# Patient Record
Sex: MALE | Race: WHITE | NOT HISPANIC OR LATINO | Employment: OTHER | URBAN - METROPOLITAN AREA
[De-identification: names, ages, dates, MRNs, and addresses within clinical notes are randomized per-mention and may not be internally consistent; named-entity substitution may affect disease eponyms.]

---

## 2017-01-10 ENCOUNTER — OFFICE VISIT (OUTPATIENT)
Dept: ORTHOPEDIC SURGERY | Facility: CLINIC | Age: 72
End: 2017-01-10

## 2017-01-10 VITALS — TEMPERATURE: 97.9 F | WEIGHT: 250 LBS | HEIGHT: 72 IN | BODY MASS INDEX: 33.86 KG/M2

## 2017-01-10 DIAGNOSIS — Z96.642 HISTORY OF TOTAL HIP ARTHROPLASTY, LEFT: Primary | ICD-10-CM

## 2017-01-10 DIAGNOSIS — Z96.642 HISTORY OF LEFT HIP REPLACEMENT: ICD-10-CM

## 2017-01-10 PROCEDURE — 73502 X-RAY EXAM HIP UNI 2-3 VIEWS: CPT | Performed by: ORTHOPAEDIC SURGERY

## 2017-01-10 PROCEDURE — 99212 OFFICE O/P EST SF 10 MIN: CPT | Performed by: ORTHOPAEDIC SURGERY

## 2017-02-13 RX ORDER — AMLODIPINE BESYLATE 5 MG/1
TABLET ORAL
Qty: 90 TABLET | Refills: 1 | Status: SHIPPED | OUTPATIENT
Start: 2017-02-13 | End: 2017-08-14 | Stop reason: SDUPTHER

## 2017-02-24 ENCOUNTER — TELEPHONE (OUTPATIENT)
Dept: INTERNAL MEDICINE | Facility: CLINIC | Age: 72
End: 2017-02-24

## 2017-02-27 RX ORDER — LANCETS
1 EACH MISCELLANEOUS DAILY
Qty: 100 EACH | Refills: 5 | Status: SHIPPED | OUTPATIENT
Start: 2017-02-27 | End: 2018-06-21 | Stop reason: SDUPTHER

## 2017-03-17 RX ORDER — LEVOTHYROXINE SODIUM 0.05 MG/1
TABLET ORAL
Qty: 90 TABLET | Refills: 0 | Status: SHIPPED | OUTPATIENT
Start: 2017-03-17 | End: 2017-06-13 | Stop reason: SDUPTHER

## 2017-04-21 RX ORDER — LISINOPRIL AND HYDROCHLOROTHIAZIDE 20; 12.5 MG/1; MG/1
TABLET ORAL
Qty: 90 TABLET | Refills: 1 | Status: SHIPPED | OUTPATIENT
Start: 2017-04-21 | End: 2017-10-23 | Stop reason: SDUPTHER

## 2017-05-16 ENCOUNTER — TELEPHONE (OUTPATIENT)
Dept: INTERNAL MEDICINE | Facility: CLINIC | Age: 72
End: 2017-05-16

## 2017-05-17 ENCOUNTER — RESULTS ENCOUNTER (OUTPATIENT)
Dept: INTERNAL MEDICINE | Facility: CLINIC | Age: 72
End: 2017-05-17

## 2017-05-17 DIAGNOSIS — E78.5 HYPERLIPIDEMIA, UNSPECIFIED HYPERLIPIDEMIA TYPE: ICD-10-CM

## 2017-05-17 DIAGNOSIS — E03.8 OTHER SPECIFIED HYPOTHYROIDISM: ICD-10-CM

## 2017-05-17 DIAGNOSIS — E11.8 CONTROLLED TYPE 2 DIABETES MELLITUS WITH COMPLICATION, WITHOUT LONG-TERM CURRENT USE OF INSULIN (HCC): ICD-10-CM

## 2017-05-17 LAB
ALBUMIN SERPL-MCNC: 4.4 G/DL (ref 3.5–4.8)
ALBUMIN/GLOB SERPL: 1.6 {RATIO} (ref 1.2–2.2)
ALP SERPL-CCNC: 77 IU/L (ref 39–117)
ALT SERPL-CCNC: 54 IU/L (ref 0–44)
AST SERPL-CCNC: 48 IU/L (ref 0–40)
BILIRUB SERPL-MCNC: 0.3 MG/DL (ref 0–1.2)
BUN SERPL-MCNC: 11 MG/DL (ref 8–27)
BUN/CREAT SERPL: 12 (ref 10–24)
CALCIUM SERPL-MCNC: 9.3 MG/DL (ref 8.6–10.2)
CHLORIDE SERPL-SCNC: 101 MMOL/L (ref 96–106)
CHOLEST SERPL-MCNC: 171 MG/DL (ref 100–199)
CO2 SERPL-SCNC: 25 MMOL/L (ref 18–29)
CREAT SERPL-MCNC: 0.91 MG/DL (ref 0.76–1.27)
GLOBULIN SER CALC-MCNC: 2.7 G/DL (ref 1.5–4.5)
GLUCOSE SERPL-MCNC: 134 MG/DL (ref 65–99)
HBA1C MFR BLD: 7.4 % (ref 4.8–5.6)
HCV AB S/CO SERPL IA: <0.1 S/CO RATIO (ref 0–0.9)
HDLC SERPL-MCNC: 34 MG/DL
LDLC SERPL CALC-MCNC: 95 MG/DL (ref 0–99)
LDLC/HDLC SERPL: 2.8 RATIO UNITS (ref 0–3.6)
POTASSIUM SERPL-SCNC: 4.8 MMOL/L (ref 3.5–5.2)
PROT SERPL-MCNC: 7.1 G/DL (ref 6–8.5)
SODIUM SERPL-SCNC: 143 MMOL/L (ref 134–144)
TRIGL SERPL-MCNC: 210 MG/DL (ref 0–149)
TSH SERPL DL<=0.005 MIU/L-ACNC: 3.19 UIU/ML (ref 0.45–4.5)
VLDLC SERPL CALC-MCNC: 42 MG/DL (ref 5–40)

## 2017-05-22 ENCOUNTER — OFFICE VISIT (OUTPATIENT)
Dept: INTERNAL MEDICINE | Facility: CLINIC | Age: 72
End: 2017-05-22

## 2017-05-22 VITALS
HEIGHT: 72 IN | OXYGEN SATURATION: 97 % | DIASTOLIC BLOOD PRESSURE: 78 MMHG | SYSTOLIC BLOOD PRESSURE: 114 MMHG | BODY MASS INDEX: 34.23 KG/M2 | WEIGHT: 252.7 LBS | HEART RATE: 67 BPM

## 2017-05-22 DIAGNOSIS — E78.5 HYPERLIPIDEMIA, UNSPECIFIED HYPERLIPIDEMIA TYPE: Primary | ICD-10-CM

## 2017-05-22 DIAGNOSIS — E11.8 CONTROLLED TYPE 2 DIABETES MELLITUS WITH COMPLICATION, WITHOUT LONG-TERM CURRENT USE OF INSULIN (HCC): ICD-10-CM

## 2017-05-22 DIAGNOSIS — R53.83 FATIGUE, UNSPECIFIED TYPE: ICD-10-CM

## 2017-05-22 DIAGNOSIS — I10 ESSENTIAL HYPERTENSION: ICD-10-CM

## 2017-05-22 PROCEDURE — 99214 OFFICE O/P EST MOD 30 MIN: CPT | Performed by: INTERNAL MEDICINE

## 2017-06-13 RX ORDER — LEVOTHYROXINE SODIUM 0.05 MG/1
TABLET ORAL
Qty: 90 TABLET | Refills: 1 | Status: SHIPPED | OUTPATIENT
Start: 2017-06-13 | End: 2017-12-20 | Stop reason: SDUPTHER

## 2017-06-23 RX ORDER — METFORMIN HYDROCHLORIDE 500 MG/1
TABLET, EXTENDED RELEASE ORAL
Qty: 180 TABLET | Refills: 1 | Status: SHIPPED | OUTPATIENT
Start: 2017-06-23 | End: 2018-02-25 | Stop reason: SDUPTHER

## 2017-08-14 RX ORDER — AMLODIPINE BESYLATE 5 MG/1
TABLET ORAL
Qty: 90 TABLET | Refills: 1 | Status: SHIPPED | OUTPATIENT
Start: 2017-08-14 | End: 2018-02-16 | Stop reason: SDUPTHER

## 2017-08-15 DIAGNOSIS — E11.8 CONTROLLED TYPE 2 DIABETES MELLITUS WITH COMPLICATION, WITHOUT LONG-TERM CURRENT USE OF INSULIN (HCC): ICD-10-CM

## 2017-08-15 DIAGNOSIS — R53.83 FATIGUE, UNSPECIFIED TYPE: ICD-10-CM

## 2017-08-16 LAB
ALBUMIN SERPL-MCNC: 4.5 G/DL (ref 3.5–5.2)
ALBUMIN/GLOB SERPL: 1.5 G/DL
ALP SERPL-CCNC: 73 U/L (ref 39–117)
ALT SERPL-CCNC: 50 U/L (ref 1–41)
AST SERPL-CCNC: 35 U/L (ref 1–40)
BASOPHILS # BLD AUTO: 0.03 10*3/MM3 (ref 0–0.2)
BASOPHILS NFR BLD AUTO: 0.3 % (ref 0–1.5)
BILIRUB SERPL-MCNC: 0.4 MG/DL (ref 0.1–1.2)
BUN SERPL-MCNC: 12 MG/DL (ref 8–23)
BUN/CREAT SERPL: 12.2 (ref 7–25)
CALCIUM SERPL-MCNC: 9.8 MG/DL (ref 8.6–10.5)
CHLORIDE SERPL-SCNC: 99 MMOL/L (ref 98–107)
CO2 SERPL-SCNC: 27 MMOL/L (ref 22–29)
CREAT SERPL-MCNC: 0.98 MG/DL (ref 0.76–1.27)
EOSINOPHIL # BLD AUTO: 0.58 10*3/MM3 (ref 0–0.7)
EOSINOPHIL NFR BLD AUTO: 6.1 % (ref 0.3–6.2)
ERYTHROCYTE [DISTWIDTH] IN BLOOD BY AUTOMATED COUNT: 13.3 % (ref 11.5–14.5)
GLOBULIN SER CALC-MCNC: 3.1 GM/DL
GLUCOSE SERPL-MCNC: 142 MG/DL (ref 65–99)
HBA1C MFR BLD: 7.03 % (ref 4.8–5.6)
HCT VFR BLD AUTO: 46.1 % (ref 40.4–52.2)
HGB BLD-MCNC: 14.5 G/DL (ref 13.7–17.6)
IMM GRANULOCYTES # BLD: 0.05 10*3/MM3 (ref 0–0.03)
IMM GRANULOCYTES NFR BLD: 0.5 % (ref 0–0.5)
LYMPHOCYTES # BLD AUTO: 3.08 10*3/MM3 (ref 0.9–4.8)
LYMPHOCYTES NFR BLD AUTO: 32.4 % (ref 19.6–45.3)
MCH RBC QN AUTO: 28.7 PG (ref 27–32.7)
MCHC RBC AUTO-ENTMCNC: 31.5 G/DL (ref 32.6–36.4)
MCV RBC AUTO: 91.1 FL (ref 79.8–96.2)
MONOCYTES # BLD AUTO: 0.79 10*3/MM3 (ref 0.2–1.2)
MONOCYTES NFR BLD AUTO: 8.3 % (ref 5–12)
NEUTROPHILS # BLD AUTO: 4.99 10*3/MM3 (ref 1.9–8.1)
NEUTROPHILS NFR BLD AUTO: 52.4 % (ref 42.7–76)
PLATELET # BLD AUTO: 278 10*3/MM3 (ref 140–500)
POTASSIUM SERPL-SCNC: 4.7 MMOL/L (ref 3.5–5.2)
PROT SERPL-MCNC: 7.6 G/DL (ref 6–8.5)
RBC # BLD AUTO: 5.06 10*6/MM3 (ref 4.6–6)
SODIUM SERPL-SCNC: 142 MMOL/L (ref 136–145)
WBC # BLD AUTO: 9.52 10*3/MM3 (ref 4.5–10.7)

## 2017-08-22 ENCOUNTER — OFFICE VISIT (OUTPATIENT)
Dept: INTERNAL MEDICINE | Facility: CLINIC | Age: 72
End: 2017-08-22

## 2017-08-22 VITALS
SYSTOLIC BLOOD PRESSURE: 116 MMHG | DIASTOLIC BLOOD PRESSURE: 64 MMHG | BODY MASS INDEX: 34 KG/M2 | OXYGEN SATURATION: 98 % | WEIGHT: 251 LBS | HEIGHT: 72 IN | HEART RATE: 61 BPM

## 2017-08-22 DIAGNOSIS — E11.40 CONTROLLED TYPE 2 DIABETES MELLITUS WITH DIABETIC NEUROPATHY, WITHOUT LONG-TERM CURRENT USE OF INSULIN (HCC): ICD-10-CM

## 2017-08-22 DIAGNOSIS — E11.8 CONTROLLED TYPE 2 DIABETES MELLITUS WITH COMPLICATION, WITHOUT LONG-TERM CURRENT USE OF INSULIN (HCC): Primary | ICD-10-CM

## 2017-08-22 DIAGNOSIS — R35.1 NOCTURIA: ICD-10-CM

## 2017-08-22 DIAGNOSIS — I10 ESSENTIAL HYPERTENSION: ICD-10-CM

## 2017-08-22 DIAGNOSIS — Z00.00 MEDICARE ANNUAL WELLNESS VISIT, INITIAL: ICD-10-CM

## 2017-08-22 DIAGNOSIS — G62.9 NEUROPATHY: ICD-10-CM

## 2017-08-22 PROCEDURE — 99213 OFFICE O/P EST LOW 20 MIN: CPT | Performed by: INTERNAL MEDICINE

## 2017-08-22 PROCEDURE — G0438 PPPS, INITIAL VISIT: HCPCS | Performed by: INTERNAL MEDICINE

## 2017-10-23 RX ORDER — LISINOPRIL AND HYDROCHLOROTHIAZIDE 20; 12.5 MG/1; MG/1
TABLET ORAL
Qty: 90 TABLET | Refills: 0 | Status: SHIPPED | OUTPATIENT
Start: 2017-10-23 | End: 2018-01-17 | Stop reason: SDUPTHER

## 2017-11-13 DIAGNOSIS — I10 ESSENTIAL HYPERTENSION: ICD-10-CM

## 2017-11-13 DIAGNOSIS — R35.1 NOCTURIA: ICD-10-CM

## 2017-11-13 DIAGNOSIS — E11.8 CONTROLLED TYPE 2 DIABETES MELLITUS WITH COMPLICATION, WITHOUT LONG-TERM CURRENT USE OF INSULIN (HCC): ICD-10-CM

## 2017-11-14 LAB
ALBUMIN SERPL-MCNC: 4.4 G/DL (ref 3.5–5.2)
ALBUMIN/GLOB SERPL: 1.5 G/DL
ALP SERPL-CCNC: 82 U/L (ref 39–117)
ALT SERPL-CCNC: 42 U/L (ref 1–41)
AST SERPL-CCNC: 30 U/L (ref 1–40)
BILIRUB SERPL-MCNC: 0.3 MG/DL (ref 0.1–1.2)
BUN SERPL-MCNC: 11 MG/DL (ref 8–23)
BUN/CREAT SERPL: 10.4 (ref 7–25)
CALCIUM SERPL-MCNC: 10.1 MG/DL (ref 8.6–10.5)
CHLORIDE SERPL-SCNC: 100 MMOL/L (ref 98–107)
CHOLEST SERPL-MCNC: 178 MG/DL (ref 0–200)
CO2 SERPL-SCNC: 32.2 MMOL/L (ref 22–29)
CREAT SERPL-MCNC: 1.06 MG/DL (ref 0.76–1.27)
GFR SERPLBLD CREATININE-BSD FMLA CKD-EPI: 69 ML/MIN/1.73
GFR SERPLBLD CREATININE-BSD FMLA CKD-EPI: 84 ML/MIN/1.73
GLOBULIN SER CALC-MCNC: 2.9 GM/DL
GLUCOSE SERPL-MCNC: 141 MG/DL (ref 65–99)
HBA1C MFR BLD: 7.16 % (ref 4.8–5.6)
HDLC SERPL-MCNC: 38 MG/DL (ref 40–60)
LDLC SERPL CALC-MCNC: 91 MG/DL (ref 0–100)
LDLC/HDLC SERPL: 2.39 {RATIO}
POTASSIUM SERPL-SCNC: 4.6 MMOL/L (ref 3.5–5.2)
PROT SERPL-MCNC: 7.3 G/DL (ref 6–8.5)
PSA SERPL-MCNC: 1.91 NG/ML (ref 0–4)
SODIUM SERPL-SCNC: 144 MMOL/L (ref 136–145)
TRIGL SERPL-MCNC: 246 MG/DL (ref 0–150)
TSH SERPL DL<=0.005 MIU/L-ACNC: 2.97 MIU/ML (ref 0.27–4.2)
VLDLC SERPL CALC-MCNC: 49.2 MG/DL (ref 5–40)

## 2017-11-22 ENCOUNTER — OFFICE VISIT (OUTPATIENT)
Dept: INTERNAL MEDICINE | Facility: CLINIC | Age: 72
End: 2017-11-22

## 2017-11-22 VITALS
SYSTOLIC BLOOD PRESSURE: 130 MMHG | OXYGEN SATURATION: 97 % | HEART RATE: 64 BPM | DIASTOLIC BLOOD PRESSURE: 80 MMHG | WEIGHT: 253.6 LBS | HEIGHT: 72 IN | BODY MASS INDEX: 34.35 KG/M2

## 2017-11-22 DIAGNOSIS — E11.40 CONTROLLED TYPE 2 DIABETES MELLITUS WITH DIABETIC NEUROPATHY, WITHOUT LONG-TERM CURRENT USE OF INSULIN (HCC): Primary | ICD-10-CM

## 2017-11-22 DIAGNOSIS — I10 ESSENTIAL HYPERTENSION: ICD-10-CM

## 2017-11-22 DIAGNOSIS — Z23 NEED FOR INFLUENZA VACCINATION: ICD-10-CM

## 2017-11-22 DIAGNOSIS — F51.01 PRIMARY INSOMNIA: ICD-10-CM

## 2017-11-22 DIAGNOSIS — E55.9 VITAMIN D DEFICIENCY: ICD-10-CM

## 2017-11-22 DIAGNOSIS — E78.5 HYPERLIPIDEMIA, UNSPECIFIED HYPERLIPIDEMIA TYPE: ICD-10-CM

## 2017-11-22 PROCEDURE — 99214 OFFICE O/P EST MOD 30 MIN: CPT | Performed by: INTERNAL MEDICINE

## 2017-12-20 RX ORDER — LEVOTHYROXINE SODIUM 0.05 MG/1
TABLET ORAL
Qty: 90 TABLET | Refills: 1 | Status: SHIPPED | OUTPATIENT
Start: 2017-12-20 | End: 2018-06-16 | Stop reason: SDUPTHER

## 2017-12-27 ENCOUNTER — OFFICE VISIT (OUTPATIENT)
Dept: INTERNAL MEDICINE | Facility: CLINIC | Age: 72
End: 2017-12-27

## 2017-12-27 VITALS
OXYGEN SATURATION: 98 % | HEART RATE: 60 BPM | SYSTOLIC BLOOD PRESSURE: 120 MMHG | WEIGHT: 253.7 LBS | BODY MASS INDEX: 34.36 KG/M2 | HEIGHT: 72 IN | DIASTOLIC BLOOD PRESSURE: 60 MMHG

## 2017-12-27 DIAGNOSIS — E11.40 CONTROLLED TYPE 2 DIABETES MELLITUS WITH DIABETIC NEUROPATHY, WITHOUT LONG-TERM CURRENT USE OF INSULIN (HCC): ICD-10-CM

## 2017-12-27 DIAGNOSIS — E53.8 COBALAMIN DEFICIENCY: ICD-10-CM

## 2017-12-27 DIAGNOSIS — N32.81 OAB (OVERACTIVE BLADDER): Primary | ICD-10-CM

## 2017-12-27 DIAGNOSIS — I10 ESSENTIAL HYPERTENSION: ICD-10-CM

## 2017-12-27 DIAGNOSIS — E55.9 VITAMIN D DEFICIENCY: ICD-10-CM

## 2017-12-27 PROCEDURE — 99213 OFFICE O/P EST LOW 20 MIN: CPT | Performed by: INTERNAL MEDICINE

## 2018-01-17 RX ORDER — LISINOPRIL AND HYDROCHLOROTHIAZIDE 20; 12.5 MG/1; MG/1
TABLET ORAL
Qty: 90 TABLET | Refills: 0 | Status: SHIPPED | OUTPATIENT
Start: 2018-01-17 | End: 2018-04-12 | Stop reason: SDUPTHER

## 2018-02-16 RX ORDER — AMLODIPINE BESYLATE 5 MG/1
TABLET ORAL
Qty: 90 TABLET | Refills: 0 | Status: SHIPPED | OUTPATIENT
Start: 2018-02-16 | End: 2018-02-21

## 2018-02-21 ENCOUNTER — LAB (OUTPATIENT)
Dept: LAB | Facility: HOSPITAL | Age: 73
End: 2018-02-21

## 2018-02-21 ENCOUNTER — TELEPHONE (OUTPATIENT)
Dept: CARDIOLOGY | Facility: CLINIC | Age: 73
End: 2018-02-21

## 2018-02-21 ENCOUNTER — OFFICE VISIT (OUTPATIENT)
Dept: INTERNAL MEDICINE | Facility: CLINIC | Age: 73
End: 2018-02-21

## 2018-02-21 ENCOUNTER — OFFICE VISIT (OUTPATIENT)
Dept: CARDIOLOGY | Facility: CLINIC | Age: 73
End: 2018-02-21

## 2018-02-21 VITALS
BODY MASS INDEX: 34.54 KG/M2 | SYSTOLIC BLOOD PRESSURE: 130 MMHG | HEIGHT: 72 IN | WEIGHT: 255 LBS | DIASTOLIC BLOOD PRESSURE: 80 MMHG | HEART RATE: 137 BPM

## 2018-02-21 VITALS
HEIGHT: 72 IN | WEIGHT: 256 LBS | OXYGEN SATURATION: 98 % | BODY MASS INDEX: 34.67 KG/M2 | DIASTOLIC BLOOD PRESSURE: 88 MMHG | HEART RATE: 76 BPM | SYSTOLIC BLOOD PRESSURE: 130 MMHG

## 2018-02-21 DIAGNOSIS — I48.0 PAF (PAROXYSMAL ATRIAL FIBRILLATION) (HCC): Primary | ICD-10-CM

## 2018-02-21 DIAGNOSIS — I48.0 PAF (PAROXYSMAL ATRIAL FIBRILLATION) (HCC): ICD-10-CM

## 2018-02-21 DIAGNOSIS — R93.3 ABNORMAL FINDINGS ON DIAGNOSTIC IMAGING OF OTHER PARTS OF DIGESTIVE TRACT: ICD-10-CM

## 2018-02-21 DIAGNOSIS — R00.2 HEART PALPITATIONS: Primary | ICD-10-CM

## 2018-02-21 LAB
25(OH)D3 SERPL-MCNC: 37.4 NG/ML (ref 30–100)
ALBUMIN SERPL-MCNC: 4.2 G/DL (ref 3.5–5.2)
ALBUMIN/GLOB SERPL: 1.4 G/DL
ALP SERPL-CCNC: 72 U/L (ref 39–117)
ALT SERPL W P-5'-P-CCNC: 34 U/L (ref 1–41)
ANION GAP SERPL CALCULATED.3IONS-SCNC: 11.8 MMOL/L
AST SERPL-CCNC: 26 U/L (ref 1–40)
BASOPHILS # BLD AUTO: 0.03 10*3/MM3 (ref 0–0.2)
BASOPHILS NFR BLD AUTO: 0.3 % (ref 0–1.5)
BILIRUB SERPL-MCNC: 0.4 MG/DL (ref 0.1–1.2)
BUN BLD-MCNC: 15 MG/DL (ref 8–23)
BUN/CREAT SERPL: 15.8 (ref 7–25)
CALCIUM SPEC-SCNC: 9.3 MG/DL (ref 8.6–10.5)
CHLORIDE SERPL-SCNC: 98 MMOL/L (ref 98–107)
CHOLEST SERPL-MCNC: 184 MG/DL (ref 0–200)
CO2 SERPL-SCNC: 27.2 MMOL/L (ref 22–29)
CREAT BLD-MCNC: 0.95 MG/DL (ref 0.76–1.27)
DEPRECATED RDW RBC AUTO: 42.5 FL (ref 37–54)
EOSINOPHIL # BLD AUTO: 0.51 10*3/MM3 (ref 0–0.7)
EOSINOPHIL NFR BLD AUTO: 5.1 % (ref 0.3–6.2)
ERYTHROCYTE [DISTWIDTH] IN BLOOD BY AUTOMATED COUNT: 13.2 % (ref 11.5–14.5)
GFR SERPL CREATININE-BSD FRML MDRD: 78 ML/MIN/1.73
GLOBULIN UR ELPH-MCNC: 3.1 GM/DL
GLUCOSE BLD-MCNC: 140 MG/DL (ref 65–99)
HBA1C MFR BLD: 7.54 % (ref 4.8–5.6)
HCT VFR BLD AUTO: 47.1 % (ref 40.4–52.2)
HDLC SERPL-MCNC: 35 MG/DL (ref 40–60)
HGB BLD-MCNC: 15.4 G/DL (ref 13.7–17.6)
IMM GRANULOCYTES # BLD: 0.05 10*3/MM3 (ref 0–0.03)
IMM GRANULOCYTES NFR BLD: 0.5 % (ref 0–0.5)
LDLC SERPL CALC-MCNC: 102 MG/DL (ref 0–100)
LDLC/HDLC SERPL: 2.93 {RATIO}
LYMPHOCYTES # BLD AUTO: 3.47 10*3/MM3 (ref 0.9–4.8)
LYMPHOCYTES NFR BLD AUTO: 34.7 % (ref 19.6–45.3)
MCH RBC QN AUTO: 28.8 PG (ref 27–32.7)
MCHC RBC AUTO-ENTMCNC: 32.7 G/DL (ref 32.6–36.4)
MCV RBC AUTO: 88.2 FL (ref 79.8–96.2)
MONOCYTES # BLD AUTO: 0.72 10*3/MM3 (ref 0.2–1.2)
MONOCYTES NFR BLD AUTO: 7.2 % (ref 5–12)
NEUTROPHILS # BLD AUTO: 5.22 10*3/MM3 (ref 1.9–8.1)
NEUTROPHILS NFR BLD AUTO: 52.2 % (ref 42.7–76)
PLATELET # BLD AUTO: 245 10*3/MM3 (ref 140–500)
PMV BLD AUTO: 10.4 FL (ref 6–12)
POTASSIUM BLD-SCNC: 4.1 MMOL/L (ref 3.5–5.2)
PROT SERPL-MCNC: 7.3 G/DL (ref 6–8.5)
RBC # BLD AUTO: 5.34 10*6/MM3 (ref 4.6–6)
SODIUM BLD-SCNC: 137 MMOL/L (ref 136–145)
TRIGL SERPL-MCNC: 233 MG/DL (ref 0–150)
TSH SERPL DL<=0.05 MIU/L-ACNC: 2.07 MIU/ML (ref 0.27–4.2)
VLDLC SERPL-MCNC: 46.6 MG/DL (ref 5–40)
WBC NRBC COR # BLD: 10 10*3/MM3 (ref 4.5–10.7)

## 2018-02-21 PROCEDURE — 99213 OFFICE O/P EST LOW 20 MIN: CPT | Performed by: INTERNAL MEDICINE

## 2018-02-21 PROCEDURE — 80053 COMPREHEN METABOLIC PANEL: CPT

## 2018-02-21 PROCEDURE — 36415 COLL VENOUS BLD VENIPUNCTURE: CPT

## 2018-02-21 PROCEDURE — 93000 ELECTROCARDIOGRAM COMPLETE: CPT | Performed by: INTERNAL MEDICINE

## 2018-02-21 PROCEDURE — 84443 ASSAY THYROID STIM HORMONE: CPT

## 2018-02-21 PROCEDURE — 99205 OFFICE O/P NEW HI 60 MIN: CPT | Performed by: INTERNAL MEDICINE

## 2018-02-21 PROCEDURE — 80061 LIPID PANEL: CPT

## 2018-02-21 PROCEDURE — 82306 VITAMIN D 25 HYDROXY: CPT | Performed by: INTERNAL MEDICINE

## 2018-02-21 PROCEDURE — 83036 HEMOGLOBIN GLYCOSYLATED A1C: CPT | Performed by: INTERNAL MEDICINE

## 2018-02-21 PROCEDURE — 85025 COMPLETE CBC W/AUTO DIFF WBC: CPT

## 2018-02-21 RX ORDER — METOPROLOL SUCCINATE 25 MG/1
25 TABLET, EXTENDED RELEASE ORAL DAILY
Qty: 30 TABLET | Refills: 11 | Status: SHIPPED | OUTPATIENT
Start: 2018-02-21 | End: 2019-03-11 | Stop reason: SDUPTHER

## 2018-02-22 ENCOUNTER — RESULTS ENCOUNTER (OUTPATIENT)
Dept: INTERNAL MEDICINE | Facility: CLINIC | Age: 73
End: 2018-02-22

## 2018-02-22 ENCOUNTER — HOSPITAL ENCOUNTER (OUTPATIENT)
Dept: CARDIOLOGY | Facility: HOSPITAL | Age: 73
Discharge: HOME | End: 2018-02-22
Attending: INTERNAL MEDICINE | Admitting: INTERNAL MEDICINE

## 2018-02-22 VITALS
HEIGHT: 70 IN | DIASTOLIC BLOOD PRESSURE: 80 MMHG | BODY MASS INDEX: 36.65 KG/M2 | SYSTOLIC BLOOD PRESSURE: 120 MMHG | WEIGHT: 256 LBS | HEART RATE: 64 BPM

## 2018-02-22 DIAGNOSIS — I10 ESSENTIAL HYPERTENSION: ICD-10-CM

## 2018-02-22 DIAGNOSIS — E11.40 CONTROLLED TYPE 2 DIABETES MELLITUS WITH DIABETIC NEUROPATHY, WITHOUT LONG-TERM CURRENT USE OF INSULIN (HCC): ICD-10-CM

## 2018-02-22 DIAGNOSIS — I48.0 PAF (PAROXYSMAL ATRIAL FIBRILLATION) (HCC): ICD-10-CM

## 2018-02-22 DIAGNOSIS — E55.9 VITAMIN D DEFICIENCY: ICD-10-CM

## 2018-02-22 LAB
ASCENDING AORTA: 3.2 CM
BH CV ECHO MEAS - AO ROOT DIAM: 3.7 CM
BH CV ECHO MEAS - AO V2 MAX: 127 CM/SEC
BH CV ECHO MEAS - AO V2 VTI: 31 CM
BH CV ECHO MEAS - EDV(MOD-SP2): 104 ML
BH CV ECHO MEAS - EDV(MOD-SP4): 149 ML
BH CV ECHO MEAS - EF(MOD-SP4): 62 %
BH CV ECHO MEAS - ESV(MOD-SP2): 40 ML
BH CV ECHO MEAS - ESV(MOD-SP4): 58 ML
BH CV ECHO MEAS - IVSD: 1.3 CM
BH CV ECHO MEAS - LAT PEAK E' VEL: 10 CM/SEC
BH CV ECHO MEAS - LV V1 MAX: 104 CM/SEC
BH CV ECHO MEAS - LV V1 VTI: 27 CM
BH CV ECHO MEAS - LVIDD: 6 CM
BH CV ECHO MEAS - LVIDS: 4.1 CM
BH CV ECHO MEAS - LVOT DIAM: 1.9 CM
BH CV ECHO MEAS - LVPWD: 1.2 CM
BH CV ECHO MEAS - MED PEAK E' VEL: 7 CM/SEC
BH CV ECHO MEAS - MV A DUR: 116 SEC
BH CV ECHO MEAS - MV A MAX VEL: 89 CM/SEC
BH CV ECHO MEAS - MV DEC TIME: 176 MSEC
BH CV ECHO MEAS - MV E MAX VEL: 90 CM/SEC
BH CV ECHO MEAS - MV E/A: 1
BH CV ECHO MEAS - PA ACC TIME: 151 SEC
BH CV ECHO MEAS - PULM A REVS DUR: 106 SEC
BH CV ECHO MEAS - PULM A REVS VEL: 106 CM/SEC
BH CV ECHO MEAS - PULM DIAS VEL: 37 CM/SEC
BH CV ECHO MEAS - PULM S/D: 1.5
BH CV ECHO MEAS - PULM SYS VEL: 57 CM/SEC
BH CV ECHO MEAS - RAP SYSTOLE: 8 MMHG
BH CV ECHO MEAS - RV V1 MAX: 107 CM/SEC
BH CV ECHO MEAS - RV V1 VTI: 18 CM
BH CV ECHO MEAS - RVOT DIAM: 1.9 CM
BH CV ECHO MEAS - RVSP: 28 MMHG
BH CV ECHO MEAS - SUP REN AO DIAM: 2.1 CM
BH CV ECHO MEAS - TAPSE (>1.6): 2.4 CM2
BH CV ECHO MEAS - TR MAX VEL: 222 CM/SEC
BH CV XLRA - RV BASE: 2.9 CM
BH CV XLRA - TDI S': 10 CM/SEC
E/E' RATIO: 11.5
LEFT ATRIUM VOLUME INDEX: 25 ML/M2
LV EF 2D ECHO EST: 62 %
MAXIMAL PREDICTED HEART RATE: 149 BPM
SINUS: 3.4 CM
STJ: 2.8 CM
STRESS TARGET HR: 127 BPM

## 2018-02-22 PROCEDURE — 93306 TTE W/DOPPLER COMPLETE: CPT | Performed by: INTERNAL MEDICINE

## 2018-02-22 PROCEDURE — 93306 TTE W/DOPPLER COMPLETE: CPT

## 2018-02-26 RX ORDER — METFORMIN HYDROCHLORIDE 500 MG/1
TABLET, EXTENDED RELEASE ORAL
Qty: 180 TABLET | Refills: 1 | Status: SHIPPED | OUTPATIENT
Start: 2018-02-26 | End: 2018-12-13

## 2018-02-27 ENCOUNTER — RESULTS ENCOUNTER (OUTPATIENT)
Dept: INTERNAL MEDICINE | Facility: CLINIC | Age: 73
End: 2018-02-27

## 2018-02-27 DIAGNOSIS — E55.9 VITAMIN D DEFICIENCY: ICD-10-CM

## 2018-02-27 DIAGNOSIS — E11.40 CONTROLLED TYPE 2 DIABETES MELLITUS WITH DIABETIC NEUROPATHY, WITHOUT LONG-TERM CURRENT USE OF INSULIN (HCC): ICD-10-CM

## 2018-02-27 DIAGNOSIS — E53.8 COBALAMIN DEFICIENCY: ICD-10-CM

## 2018-02-28 ENCOUNTER — OFFICE VISIT (OUTPATIENT)
Dept: INTERNAL MEDICINE | Facility: CLINIC | Age: 73
End: 2018-02-28

## 2018-02-28 VITALS
DIASTOLIC BLOOD PRESSURE: 66 MMHG | BODY MASS INDEX: 35.79 KG/M2 | OXYGEN SATURATION: 97 % | WEIGHT: 250 LBS | HEIGHT: 70 IN | HEART RATE: 60 BPM | SYSTOLIC BLOOD PRESSURE: 130 MMHG | TEMPERATURE: 99 F

## 2018-02-28 DIAGNOSIS — R13.10 DYSPHAGIA, UNSPECIFIED TYPE: ICD-10-CM

## 2018-02-28 DIAGNOSIS — I10 ESSENTIAL HYPERTENSION: ICD-10-CM

## 2018-02-28 DIAGNOSIS — K21.9 GASTROESOPHAGEAL REFLUX DISEASE, ESOPHAGITIS PRESENCE NOT SPECIFIED: ICD-10-CM

## 2018-02-28 DIAGNOSIS — E03.8 OTHER SPECIFIED HYPOTHYROIDISM: ICD-10-CM

## 2018-02-28 DIAGNOSIS — E11.40 CONTROLLED TYPE 2 DIABETES MELLITUS WITH DIABETIC NEUROPATHY, WITHOUT LONG-TERM CURRENT USE OF INSULIN (HCC): Primary | ICD-10-CM

## 2018-02-28 PROCEDURE — 99214 OFFICE O/P EST MOD 30 MIN: CPT | Performed by: INTERNAL MEDICINE

## 2018-03-09 ENCOUNTER — OFFICE VISIT (OUTPATIENT)
Dept: CARDIOLOGY | Facility: CLINIC | Age: 73
End: 2018-03-09

## 2018-03-09 VITALS
BODY MASS INDEX: 36.51 KG/M2 | SYSTOLIC BLOOD PRESSURE: 130 MMHG | WEIGHT: 255 LBS | HEIGHT: 70 IN | DIASTOLIC BLOOD PRESSURE: 72 MMHG | HEART RATE: 54 BPM

## 2018-03-09 DIAGNOSIS — I48.0 PAROXYSMAL ATRIAL FIBRILLATION (HCC): Primary | ICD-10-CM

## 2018-03-09 DIAGNOSIS — I34.1 MITRAL VALVE PROLAPSE: ICD-10-CM

## 2018-03-09 PROCEDURE — 99213 OFFICE O/P EST LOW 20 MIN: CPT | Performed by: INTERNAL MEDICINE

## 2018-03-16 ENCOUNTER — OFFICE VISIT (OUTPATIENT)
Dept: GASTROENTEROLOGY | Facility: CLINIC | Age: 73
End: 2018-03-16

## 2018-03-16 VITALS
TEMPERATURE: 97.7 F | WEIGHT: 259 LBS | BODY MASS INDEX: 35.08 KG/M2 | DIASTOLIC BLOOD PRESSURE: 78 MMHG | SYSTOLIC BLOOD PRESSURE: 136 MMHG | HEIGHT: 72 IN

## 2018-03-16 DIAGNOSIS — D12.2 ADENOMATOUS POLYP OF ASCENDING COLON: ICD-10-CM

## 2018-03-16 DIAGNOSIS — R13.19 OTHER DYSPHAGIA: Primary | ICD-10-CM

## 2018-03-16 DIAGNOSIS — Z83.71 FAMILY HISTORY OF POLYPS IN THE COLON: ICD-10-CM

## 2018-03-16 DIAGNOSIS — Z80.0 FAMILY HISTORY OF GI MALIGNANCY: ICD-10-CM

## 2018-03-16 PROCEDURE — 99204 OFFICE O/P NEW MOD 45 MIN: CPT | Performed by: INTERNAL MEDICINE

## 2018-03-19 PROBLEM — I48.91 A-FIB: Status: ACTIVE | Noted: 2018-03-19

## 2018-03-21 ENCOUNTER — HOSPITAL ENCOUNTER (OUTPATIENT)
Facility: HOSPITAL | Age: 73
Setting detail: HOSPITAL OUTPATIENT SURGERY
Discharge: HOME | End: 2018-03-21
Attending: INTERNAL MEDICINE | Admitting: INTERNAL MEDICINE

## 2018-03-21 ENCOUNTER — ANESTHESIA EVENT (OUTPATIENT)
Dept: GASTROENTEROLOGY | Facility: HOSPITAL | Age: 73
End: 2018-03-21

## 2018-03-21 ENCOUNTER — ANESTHESIA (OUTPATIENT)
Dept: GASTROENTEROLOGY | Facility: HOSPITAL | Age: 73
End: 2018-03-21

## 2018-03-21 VITALS
HEART RATE: 63 BPM | SYSTOLIC BLOOD PRESSURE: 128 MMHG | WEIGHT: 250.38 LBS | RESPIRATION RATE: 16 BRPM | HEIGHT: 72 IN | BODY MASS INDEX: 33.91 KG/M2 | TEMPERATURE: 98.1 F | OXYGEN SATURATION: 95 % | DIASTOLIC BLOOD PRESSURE: 79 MMHG

## 2018-03-21 DIAGNOSIS — R13.19 OTHER DYSPHAGIA: ICD-10-CM

## 2018-03-21 LAB
GLUCOSE BLDC GLUCOMTR-MCNC: 139 MG/DL (ref 70–130)
GLUCOSE BLDC GLUCOMTR-MCNC: 139 MG/DL (ref 70–130)

## 2018-03-21 PROCEDURE — 88305 TISSUE EXAM BY PATHOLOGIST: CPT | Performed by: INTERNAL MEDICINE

## 2018-03-21 PROCEDURE — 45380 COLONOSCOPY AND BIOPSY: CPT | Performed by: INTERNAL MEDICINE

## 2018-03-21 PROCEDURE — 82962 GLUCOSE BLOOD TEST: CPT

## 2018-03-21 PROCEDURE — 43239 EGD BIOPSY SINGLE/MULTIPLE: CPT | Performed by: INTERNAL MEDICINE

## 2018-03-21 PROCEDURE — 45385 COLONOSCOPY W/LESION REMOVAL: CPT | Performed by: INTERNAL MEDICINE

## 2018-03-21 PROCEDURE — 88312 SPECIAL STAINS GROUP 1: CPT | Performed by: INTERNAL MEDICINE

## 2018-03-21 PROCEDURE — 43450 DILATE ESOPHAGUS 1/MULT PASS: CPT | Performed by: INTERNAL MEDICINE

## 2018-03-21 PROCEDURE — 25010000002 PROPOFOL 10 MG/ML EMULSION: Performed by: NURSE ANESTHETIST, CERTIFIED REGISTERED

## 2018-03-21 RX ORDER — PROPOFOL 10 MG/ML
VIAL (ML) INTRAVENOUS CONTINUOUS PRN
Status: DISCONTINUED | OUTPATIENT
Start: 2018-03-21 | End: 2018-03-21 | Stop reason: SURG

## 2018-03-21 RX ORDER — PANTOPRAZOLE SODIUM 40 MG/1
40 TABLET, DELAYED RELEASE ORAL DAILY
Qty: 30 TABLET | Refills: 12 | Status: SHIPPED | OUTPATIENT
Start: 2018-03-21 | End: 2019-04-05 | Stop reason: SDUPTHER

## 2018-03-21 RX ORDER — LIDOCAINE HYDROCHLORIDE 20 MG/ML
INJECTION, SOLUTION INFILTRATION; PERINEURAL AS NEEDED
Status: DISCONTINUED | OUTPATIENT
Start: 2018-03-21 | End: 2018-03-21 | Stop reason: SURG

## 2018-03-21 RX ORDER — SODIUM CHLORIDE, SODIUM LACTATE, POTASSIUM CHLORIDE, CALCIUM CHLORIDE 600; 310; 30; 20 MG/100ML; MG/100ML; MG/100ML; MG/100ML
30 INJECTION, SOLUTION INTRAVENOUS CONTINUOUS PRN
Status: DISCONTINUED | OUTPATIENT
Start: 2018-03-21 | End: 2018-03-21 | Stop reason: HOSPADM

## 2018-03-21 RX ORDER — SODIUM CHLORIDE 0.9 % (FLUSH) 0.9 %
1-10 SYRINGE (ML) INJECTION AS NEEDED
Status: DISCONTINUED | OUTPATIENT
Start: 2018-03-21 | End: 2018-03-21 | Stop reason: HOSPADM

## 2018-03-21 RX ORDER — PROPOFOL 10 MG/ML
VIAL (ML) INTRAVENOUS AS NEEDED
Status: DISCONTINUED | OUTPATIENT
Start: 2018-03-21 | End: 2018-03-21 | Stop reason: SURG

## 2018-03-21 RX ADMIN — PROPOFOL 50 MG: 10 INJECTION, EMULSION INTRAVENOUS at 12:13

## 2018-03-21 RX ADMIN — SODIUM CHLORIDE, POTASSIUM CHLORIDE, SODIUM LACTATE AND CALCIUM CHLORIDE 30 ML/HR: 600; 310; 30; 20 INJECTION, SOLUTION INTRAVENOUS at 11:51

## 2018-03-21 RX ADMIN — LIDOCAINE HYDROCHLORIDE 60 MG: 20 INJECTION, SOLUTION INFILTRATION; PERINEURAL at 12:13

## 2018-03-21 RX ADMIN — PROPOFOL 300 MCG/KG/MIN: 10 INJECTION, EMULSION INTRAVENOUS at 12:13

## 2018-03-22 ENCOUNTER — PREP FOR SURGERY (OUTPATIENT)
Dept: OTHER | Facility: HOSPITAL | Age: 73
End: 2018-03-22

## 2018-03-22 DIAGNOSIS — R13.19 ESOPHAGEAL DYSPHAGIA: Primary | ICD-10-CM

## 2018-03-22 LAB
LAB AP CASE REPORT: NORMAL
Lab: NORMAL
PATH REPORT.FINAL DX SPEC: NORMAL
PATH REPORT.GROSS SPEC: NORMAL

## 2018-03-30 PROBLEM — R13.19 ESOPHAGEAL DYSPHAGIA: Status: ACTIVE | Noted: 2018-03-30

## 2018-04-04 ENCOUNTER — TELEPHONE (OUTPATIENT)
Dept: GASTROENTEROLOGY | Facility: CLINIC | Age: 73
End: 2018-04-04

## 2018-04-12 RX ORDER — LISINOPRIL AND HYDROCHLOROTHIAZIDE 20; 12.5 MG/1; MG/1
TABLET ORAL
Qty: 90 TABLET | Refills: 1 | Status: SHIPPED | OUTPATIENT
Start: 2018-04-12 | End: 2018-10-15 | Stop reason: SDUPTHER

## 2018-05-24 LAB
25(OH)D3+25(OH)D2 SERPL-MCNC: 40.2 NG/ML (ref 30–100)
ALBUMIN SERPL-MCNC: 4.2 G/DL (ref 3.5–5.2)
ALBUMIN/GLOB SERPL: 1.4 G/DL
ALP SERPL-CCNC: 90 U/L (ref 39–117)
ALT SERPL-CCNC: 42 U/L (ref 1–41)
AST SERPL-CCNC: 33 U/L (ref 1–40)
BILIRUB SERPL-MCNC: 0.2 MG/DL (ref 0.1–1.2)
BUN SERPL-MCNC: 13 MG/DL (ref 8–23)
BUN/CREAT SERPL: 12.9 (ref 7–25)
CALCIUM SERPL-MCNC: 9.5 MG/DL (ref 8.6–10.5)
CHLORIDE SERPL-SCNC: 99 MMOL/L (ref 98–107)
CO2 SERPL-SCNC: 28.3 MMOL/L (ref 22–29)
CREAT SERPL-MCNC: 1.01 MG/DL (ref 0.76–1.27)
GFR SERPLBLD CREATININE-BSD FMLA CKD-EPI: 73 ML/MIN/1.73
GFR SERPLBLD CREATININE-BSD FMLA CKD-EPI: 88 ML/MIN/1.73
GLOBULIN SER CALC-MCNC: 3 GM/DL
GLUCOSE SERPL-MCNC: 182 MG/DL (ref 65–99)
HBA1C MFR BLD: 7.7 % (ref 4.8–5.6)
POTASSIUM SERPL-SCNC: 4.6 MMOL/L (ref 3.5–5.2)
PROT SERPL-MCNC: 7.2 G/DL (ref 6–8.5)
SODIUM SERPL-SCNC: 143 MMOL/L (ref 136–145)
VIT B12 SERPL-MCNC: 698 PG/ML (ref 211–946)

## 2018-05-28 ENCOUNTER — RESULTS ENCOUNTER (OUTPATIENT)
Dept: INTERNAL MEDICINE | Facility: CLINIC | Age: 73
End: 2018-05-28

## 2018-05-28 DIAGNOSIS — E11.40 CONTROLLED TYPE 2 DIABETES MELLITUS WITH DIABETIC NEUROPATHY, WITHOUT LONG-TERM CURRENT USE OF INSULIN (HCC): ICD-10-CM

## 2018-06-07 ENCOUNTER — OFFICE VISIT (OUTPATIENT)
Dept: INTERNAL MEDICINE | Facility: CLINIC | Age: 73
End: 2018-06-07

## 2018-06-07 VITALS
BODY MASS INDEX: 34.9 KG/M2 | HEART RATE: 61 BPM | TEMPERATURE: 98.8 F | SYSTOLIC BLOOD PRESSURE: 128 MMHG | OXYGEN SATURATION: 96 % | DIASTOLIC BLOOD PRESSURE: 74 MMHG | HEIGHT: 72 IN | WEIGHT: 257.7 LBS

## 2018-06-07 DIAGNOSIS — E11.40 CONTROLLED TYPE 2 DIABETES MELLITUS WITH DIABETIC NEUROPATHY, WITHOUT LONG-TERM CURRENT USE OF INSULIN (HCC): ICD-10-CM

## 2018-06-07 DIAGNOSIS — M54.9 UPPER BACK PAIN ON RIGHT SIDE: ICD-10-CM

## 2018-06-07 DIAGNOSIS — I10 ESSENTIAL HYPERTENSION: ICD-10-CM

## 2018-06-07 DIAGNOSIS — I48.0 PAROXYSMAL ATRIAL FIBRILLATION (HCC): Primary | ICD-10-CM

## 2018-06-07 PROCEDURE — 99214 OFFICE O/P EST MOD 30 MIN: CPT | Performed by: INTERNAL MEDICINE

## 2018-06-08 ENCOUNTER — OFFICE VISIT (OUTPATIENT)
Dept: INTERNAL MEDICINE | Facility: CLINIC | Age: 73
End: 2018-06-08

## 2018-06-08 VITALS
SYSTOLIC BLOOD PRESSURE: 128 MMHG | HEIGHT: 72 IN | OXYGEN SATURATION: 98 % | BODY MASS INDEX: 34.84 KG/M2 | WEIGHT: 257.19 LBS | DIASTOLIC BLOOD PRESSURE: 62 MMHG | HEART RATE: 95 BPM

## 2018-06-08 DIAGNOSIS — IMO0002 UNCONTROLLED TYPE 2 DIABETES MELLITUS WITH DIABETIC POLYNEUROPATHY, WITHOUT LONG-TERM CURRENT USE OF INSULIN: Primary | ICD-10-CM

## 2018-06-08 PROCEDURE — 99214 OFFICE O/P EST MOD 30 MIN: CPT | Performed by: NURSE PRACTITIONER

## 2018-06-18 RX ORDER — LEVOTHYROXINE SODIUM 0.05 MG/1
TABLET ORAL
Qty: 90 TABLET | Refills: 1 | Status: SHIPPED | OUTPATIENT
Start: 2018-06-18 | End: 2018-12-12 | Stop reason: SDUPTHER

## 2018-06-21 ENCOUNTER — ANESTHESIA (OUTPATIENT)
Dept: GASTROENTEROLOGY | Facility: HOSPITAL | Age: 73
End: 2018-06-21

## 2018-06-21 ENCOUNTER — ANESTHESIA EVENT (OUTPATIENT)
Dept: GASTROENTEROLOGY | Facility: HOSPITAL | Age: 73
End: 2018-06-21

## 2018-06-21 ENCOUNTER — HOSPITAL ENCOUNTER (OUTPATIENT)
Facility: HOSPITAL | Age: 73
Setting detail: HOSPITAL OUTPATIENT SURGERY
Discharge: HOME | End: 2018-06-21
Attending: INTERNAL MEDICINE | Admitting: INTERNAL MEDICINE

## 2018-06-21 VITALS
WEIGHT: 257.5 LBS | SYSTOLIC BLOOD PRESSURE: 127 MMHG | HEART RATE: 57 BPM | OXYGEN SATURATION: 97 % | BODY MASS INDEX: 34.88 KG/M2 | DIASTOLIC BLOOD PRESSURE: 80 MMHG | TEMPERATURE: 98.1 F | HEIGHT: 72 IN | RESPIRATION RATE: 16 BRPM

## 2018-06-21 DIAGNOSIS — R13.19 ESOPHAGEAL DYSPHAGIA: ICD-10-CM

## 2018-06-21 LAB — GLUCOSE BLDC GLUCOMTR-MCNC: 162 MG/DL (ref 70–130)

## 2018-06-21 PROCEDURE — 82962 GLUCOSE BLOOD TEST: CPT

## 2018-06-21 PROCEDURE — S0260 H&P FOR SURGERY: HCPCS | Performed by: INTERNAL MEDICINE

## 2018-06-21 PROCEDURE — 25010000002 PROPOFOL 10 MG/ML EMULSION: Performed by: ANESTHESIOLOGY

## 2018-06-21 PROCEDURE — 43450 DILATE ESOPHAGUS 1/MULT PASS: CPT | Performed by: INTERNAL MEDICINE

## 2018-06-21 PROCEDURE — 43239 EGD BIOPSY SINGLE/MULTIPLE: CPT | Performed by: INTERNAL MEDICINE

## 2018-06-21 PROCEDURE — 88305 TISSUE EXAM BY PATHOLOGIST: CPT | Performed by: INTERNAL MEDICINE

## 2018-06-21 RX ORDER — LANCETS
EACH MISCELLANEOUS
Refills: 4 | OUTPATIENT
Start: 2018-06-21

## 2018-06-21 RX ORDER — LIDOCAINE HYDROCHLORIDE 10 MG/ML
0.5 INJECTION, SOLUTION INFILTRATION; PERINEURAL ONCE AS NEEDED
Status: DISCONTINUED | OUTPATIENT
Start: 2018-06-21 | End: 2018-06-21 | Stop reason: HOSPADM

## 2018-06-21 RX ORDER — LANCETS
1 EACH MISCELLANEOUS 2 TIMES DAILY
Qty: 100 EACH | Refills: 4 | Status: SHIPPED | OUTPATIENT
Start: 2018-06-21 | End: 2019-07-27 | Stop reason: SDUPTHER

## 2018-06-21 RX ORDER — LIDOCAINE HYDROCHLORIDE 20 MG/ML
INJECTION, SOLUTION INFILTRATION; PERINEURAL AS NEEDED
Status: DISCONTINUED | OUTPATIENT
Start: 2018-06-21 | End: 2018-06-21 | Stop reason: SURG

## 2018-06-21 RX ORDER — SODIUM CHLORIDE 0.9 % (FLUSH) 0.9 %
3 SYRINGE (ML) INJECTION AS NEEDED
Status: DISCONTINUED | OUTPATIENT
Start: 2018-06-21 | End: 2018-06-21 | Stop reason: HOSPADM

## 2018-06-21 RX ORDER — PROPOFOL 10 MG/ML
VIAL (ML) INTRAVENOUS CONTINUOUS PRN
Status: DISCONTINUED | OUTPATIENT
Start: 2018-06-21 | End: 2018-06-21 | Stop reason: SURG

## 2018-06-21 RX ORDER — SODIUM CHLORIDE, SODIUM LACTATE, POTASSIUM CHLORIDE, CALCIUM CHLORIDE 600; 310; 30; 20 MG/100ML; MG/100ML; MG/100ML; MG/100ML
1000 INJECTION, SOLUTION INTRAVENOUS CONTINUOUS
Status: DISCONTINUED | OUTPATIENT
Start: 2018-06-21 | End: 2018-06-21 | Stop reason: HOSPADM

## 2018-06-21 RX ORDER — PROPOFOL 10 MG/ML
VIAL (ML) INTRAVENOUS AS NEEDED
Status: DISCONTINUED | OUTPATIENT
Start: 2018-06-21 | End: 2018-06-21 | Stop reason: SURG

## 2018-06-21 RX ADMIN — PROPOFOL 140 MCG/KG/MIN: 10 INJECTION, EMULSION INTRAVENOUS at 08:22

## 2018-06-21 RX ADMIN — LIDOCAINE HYDROCHLORIDE 50 MG: 20 INJECTION, SOLUTION INFILTRATION; PERINEURAL at 08:22

## 2018-06-21 RX ADMIN — PROPOFOL 150 MG: 10 INJECTION, EMULSION INTRAVENOUS at 08:22

## 2018-06-22 ENCOUNTER — TELEPHONE (OUTPATIENT)
Dept: INTERNAL MEDICINE | Facility: CLINIC | Age: 73
End: 2018-06-22

## 2018-06-22 ENCOUNTER — OFFICE VISIT (OUTPATIENT)
Dept: CARDIOLOGY | Facility: CLINIC | Age: 73
End: 2018-06-22

## 2018-06-22 VITALS
BODY MASS INDEX: 34.81 KG/M2 | DIASTOLIC BLOOD PRESSURE: 80 MMHG | HEART RATE: 53 BPM | WEIGHT: 257 LBS | HEIGHT: 72 IN | SYSTOLIC BLOOD PRESSURE: 130 MMHG

## 2018-06-22 DIAGNOSIS — I10 ESSENTIAL HYPERTENSION: ICD-10-CM

## 2018-06-22 DIAGNOSIS — I48.0 PAROXYSMAL ATRIAL FIBRILLATION (HCC): Primary | ICD-10-CM

## 2018-06-22 LAB
CYTO UR: NORMAL
LAB AP CASE REPORT: NORMAL
PATH REPORT.FINAL DX SPEC: NORMAL
PATH REPORT.GROSS SPEC: NORMAL

## 2018-06-22 PROCEDURE — 99214 OFFICE O/P EST MOD 30 MIN: CPT | Performed by: INTERNAL MEDICINE

## 2018-06-22 PROCEDURE — 93000 ELECTROCARDIOGRAM COMPLETE: CPT | Performed by: INTERNAL MEDICINE

## 2018-07-27 ENCOUNTER — TELEPHONE (OUTPATIENT)
Dept: GASTROENTEROLOGY | Facility: CLINIC | Age: 73
End: 2018-07-27

## 2018-08-29 DIAGNOSIS — E11.40 CONTROLLED TYPE 2 DIABETES MELLITUS WITH DIABETIC NEUROPATHY, WITHOUT LONG-TERM CURRENT USE OF INSULIN (HCC): ICD-10-CM

## 2018-08-29 DIAGNOSIS — I10 ESSENTIAL HYPERTENSION: ICD-10-CM

## 2018-08-30 LAB
ALBUMIN SERPL-MCNC: 4.3 G/DL (ref 3.5–5.2)
ALBUMIN/GLOB SERPL: 1.4 G/DL
ALP SERPL-CCNC: 78 U/L (ref 39–117)
ALT SERPL-CCNC: 54 U/L (ref 1–41)
AST SERPL-CCNC: 57 U/L (ref 1–40)
BILIRUB SERPL-MCNC: 0.4 MG/DL (ref 0.1–1.2)
BUN SERPL-MCNC: 11 MG/DL (ref 8–23)
BUN/CREAT SERPL: 11 (ref 7–25)
CALCIUM SERPL-MCNC: 9.8 MG/DL (ref 8.6–10.5)
CHLORIDE SERPL-SCNC: 100 MMOL/L (ref 98–107)
CHOLEST SERPL-MCNC: 184 MG/DL (ref 0–200)
CO2 SERPL-SCNC: 29.1 MMOL/L (ref 22–29)
CREAT SERPL-MCNC: 1 MG/DL (ref 0.76–1.27)
GLOBULIN SER CALC-MCNC: 3.1 GM/DL
GLUCOSE SERPL-MCNC: 174 MG/DL (ref 65–99)
HBA1C MFR BLD: 7.9 % (ref 4.8–5.6)
HDLC SERPL-MCNC: 31 MG/DL (ref 40–60)
LDLC SERPL CALC-MCNC: 103 MG/DL (ref 0–100)
LDLC/HDLC SERPL: 3.33 {RATIO}
POTASSIUM SERPL-SCNC: 4.5 MMOL/L (ref 3.5–5.2)
PROT SERPL-MCNC: 7.4 G/DL (ref 6–8.5)
SODIUM SERPL-SCNC: 142 MMOL/L (ref 136–145)
TRIGL SERPL-MCNC: 249 MG/DL (ref 0–150)
VLDLC SERPL CALC-MCNC: 49.8 MG/DL (ref 5–40)

## 2018-09-06 ENCOUNTER — OFFICE VISIT (OUTPATIENT)
Dept: INTERNAL MEDICINE | Facility: CLINIC | Age: 73
End: 2018-09-06

## 2018-09-06 VITALS
DIASTOLIC BLOOD PRESSURE: 68 MMHG | OXYGEN SATURATION: 99 % | BODY MASS INDEX: 35 KG/M2 | WEIGHT: 258.4 LBS | TEMPERATURE: 98 F | SYSTOLIC BLOOD PRESSURE: 126 MMHG | HEART RATE: 57 BPM | HEIGHT: 72 IN

## 2018-09-06 DIAGNOSIS — E78.5 HYPERLIPIDEMIA, UNSPECIFIED HYPERLIPIDEMIA TYPE: ICD-10-CM

## 2018-09-06 DIAGNOSIS — C61 PROSTATE CANCER (HCC): ICD-10-CM

## 2018-09-06 DIAGNOSIS — E21.4: ICD-10-CM

## 2018-09-06 DIAGNOSIS — E11.40 CONTROLLED TYPE 2 DIABETES MELLITUS WITH DIABETIC NEUROPATHY, WITHOUT LONG-TERM CURRENT USE OF INSULIN (HCC): Primary | ICD-10-CM

## 2018-09-06 DIAGNOSIS — I10 ESSENTIAL HYPERTENSION: ICD-10-CM

## 2018-09-06 PROCEDURE — 99214 OFFICE O/P EST MOD 30 MIN: CPT | Performed by: INTERNAL MEDICINE

## 2018-10-15 RX ORDER — LISINOPRIL AND HYDROCHLOROTHIAZIDE 20; 12.5 MG/1; MG/1
TABLET ORAL
Qty: 90 TABLET | Refills: 1 | Status: SHIPPED | OUTPATIENT
Start: 2018-10-15 | End: 2019-04-25 | Stop reason: SDUPTHER

## 2018-11-28 DIAGNOSIS — I10 ESSENTIAL HYPERTENSION: ICD-10-CM

## 2018-11-28 DIAGNOSIS — E11.40 CONTROLLED TYPE 2 DIABETES MELLITUS WITH DIABETIC NEUROPATHY, WITHOUT LONG-TERM CURRENT USE OF INSULIN (HCC): ICD-10-CM

## 2018-11-29 LAB
BUN SERPL-MCNC: 11 MG/DL (ref 8–23)
BUN/CREAT SERPL: 10.2 (ref 7–25)
CALCIUM SERPL-MCNC: 9.8 MG/DL (ref 8.6–10.5)
CHLORIDE SERPL-SCNC: 100 MMOL/L (ref 98–107)
CO2 SERPL-SCNC: 31.9 MMOL/L (ref 22–29)
CREAT SERPL-MCNC: 1.08 MG/DL (ref 0.76–1.27)
GLUCOSE SERPL-MCNC: 176 MG/DL (ref 65–99)
HBA1C MFR BLD: 8.03 % (ref 4.8–5.6)
POTASSIUM SERPL-SCNC: 4.7 MMOL/L (ref 3.5–5.2)
SODIUM SERPL-SCNC: 143 MMOL/L (ref 136–145)

## 2018-12-12 RX ORDER — LEVOTHYROXINE SODIUM 0.05 MG/1
TABLET ORAL
Qty: 90 TABLET | Refills: 1 | Status: SHIPPED | OUTPATIENT
Start: 2018-12-12 | End: 2019-06-12 | Stop reason: SDUPTHER

## 2018-12-13 ENCOUNTER — OFFICE VISIT (OUTPATIENT)
Dept: INTERNAL MEDICINE | Facility: CLINIC | Age: 73
End: 2018-12-13

## 2018-12-13 VITALS
WEIGHT: 250.1 LBS | OXYGEN SATURATION: 95 % | BODY MASS INDEX: 33.87 KG/M2 | DIASTOLIC BLOOD PRESSURE: 72 MMHG | SYSTOLIC BLOOD PRESSURE: 124 MMHG | TEMPERATURE: 98.6 F | HEART RATE: 60 BPM | HEIGHT: 72 IN

## 2018-12-13 DIAGNOSIS — Z00.00 MEDICARE ANNUAL WELLNESS VISIT, SUBSEQUENT: Primary | ICD-10-CM

## 2018-12-13 DIAGNOSIS — F51.01 PRIMARY INSOMNIA: ICD-10-CM

## 2018-12-13 DIAGNOSIS — K21.00 GASTROESOPHAGEAL REFLUX DISEASE WITH ESOPHAGITIS: ICD-10-CM

## 2018-12-13 DIAGNOSIS — I10 ESSENTIAL HYPERTENSION: ICD-10-CM

## 2018-12-13 DIAGNOSIS — E11.40 CONTROLLED TYPE 2 DIABETES MELLITUS WITH DIABETIC NEUROPATHY, WITHOUT LONG-TERM CURRENT USE OF INSULIN (HCC): ICD-10-CM

## 2018-12-13 PROCEDURE — 99214 OFFICE O/P EST MOD 30 MIN: CPT | Performed by: INTERNAL MEDICINE

## 2018-12-13 PROCEDURE — G0439 PPPS, SUBSEQ VISIT: HCPCS | Performed by: INTERNAL MEDICINE

## 2018-12-13 RX ORDER — TRAZODONE HYDROCHLORIDE 50 MG/1
TABLET ORAL
Qty: 30 TABLET | Refills: 2 | Status: SHIPPED | OUTPATIENT
Start: 2018-12-13 | End: 2019-07-28 | Stop reason: SDUPTHER

## 2018-12-26 ENCOUNTER — APPOINTMENT (OUTPATIENT)
Dept: CT IMAGING | Facility: HOSPITAL | Age: 73
End: 2018-12-26

## 2018-12-26 ENCOUNTER — HOSPITAL ENCOUNTER (EMERGENCY)
Facility: HOSPITAL | Age: 73
Discharge: HOME | End: 2018-12-26
Attending: EMERGENCY MEDICINE | Admitting: EMERGENCY MEDICINE

## 2018-12-26 VITALS
RESPIRATION RATE: 18 BRPM | TEMPERATURE: 98.2 F | HEART RATE: 74 BPM | WEIGHT: 245 LBS | OXYGEN SATURATION: 97 % | SYSTOLIC BLOOD PRESSURE: 131 MMHG | HEIGHT: 72 IN | BODY MASS INDEX: 33.18 KG/M2 | DIASTOLIC BLOOD PRESSURE: 89 MMHG

## 2018-12-26 DIAGNOSIS — K52.9 GASTROENTERITIS: Primary | ICD-10-CM

## 2018-12-26 LAB
ALBUMIN SERPL-MCNC: 3.9 G/DL (ref 3.5–5.2)
ALBUMIN/GLOB SERPL: 1.4 G/DL
ALP SERPL-CCNC: 76 U/L (ref 39–117)
ALT SERPL W P-5'-P-CCNC: 55 U/L (ref 1–41)
ANION GAP SERPL CALCULATED.3IONS-SCNC: 13.8 MMOL/L
AST SERPL-CCNC: 54 U/L (ref 1–40)
BASOPHILS # BLD AUTO: 0.07 10*3/MM3 (ref 0–0.2)
BASOPHILS NFR BLD AUTO: 0.4 % (ref 0–1.5)
BILIRUB SERPL-MCNC: 0.3 MG/DL (ref 0.1–1.2)
BILIRUB UR QL STRIP: NEGATIVE
BUN BLD-MCNC: 23 MG/DL (ref 8–23)
BUN/CREAT SERPL: 18.9 (ref 7–25)
CALCIUM SPEC-SCNC: 9.1 MG/DL (ref 8.6–10.5)
CHLORIDE SERPL-SCNC: 95 MMOL/L (ref 98–107)
CLARITY UR: CLEAR
CO2 SERPL-SCNC: 25.2 MMOL/L (ref 22–29)
COLOR UR: ABNORMAL
CREAT BLD-MCNC: 1.22 MG/DL (ref 0.76–1.27)
DEPRECATED RDW RBC AUTO: 39.6 FL (ref 37–54)
EOSINOPHIL # BLD AUTO: 0 10*3/MM3 (ref 0–0.7)
EOSINOPHIL NFR BLD AUTO: 0 % (ref 0.3–6.2)
ERYTHROCYTE [DISTWIDTH] IN BLOOD BY AUTOMATED COUNT: 13.3 % (ref 11.5–14.5)
GFR SERPL CREATININE-BSD FRML MDRD: 59 ML/MIN/1.73
GLOBULIN UR ELPH-MCNC: 2.7 GM/DL
GLUCOSE BLD-MCNC: 180 MG/DL (ref 65–99)
GLUCOSE UR STRIP-MCNC: NEGATIVE MG/DL
HCT VFR BLD AUTO: 44.4 % (ref 40.4–52.2)
HGB BLD-MCNC: 15.7 G/DL (ref 13.7–17.6)
HGB UR QL STRIP.AUTO: NEGATIVE
IMM GRANULOCYTES # BLD AUTO: 0.13 10*3/MM3 (ref 0–0.03)
IMM GRANULOCYTES NFR BLD AUTO: 0.7 % (ref 0–0.5)
KETONES UR QL STRIP: NEGATIVE
LEUKOCYTE ESTERASE UR QL STRIP.AUTO: NEGATIVE
LYMPHOCYTES # BLD AUTO: 4.73 10*3/MM3 (ref 0.9–4.8)
LYMPHOCYTES NFR BLD AUTO: 24.6 % (ref 19.6–45.3)
MCH RBC QN AUTO: 29.5 PG (ref 27–32.7)
MCHC RBC AUTO-ENTMCNC: 35.4 G/DL (ref 32.6–36.4)
MCV RBC AUTO: 83.5 FL (ref 79.8–96.2)
MONOCYTES # BLD AUTO: 1.94 10*3/MM3 (ref 0.2–1.2)
MONOCYTES NFR BLD AUTO: 10.1 % (ref 5–12)
NEUTROPHILS # BLD AUTO: 12.47 10*3/MM3 (ref 1.9–8.1)
NEUTROPHILS NFR BLD AUTO: 64.9 % (ref 42.7–76)
NITRITE UR QL STRIP: NEGATIVE
PH UR STRIP.AUTO: 5.5 [PH] (ref 5–8)
PLATELET # BLD AUTO: 294 10*3/MM3 (ref 140–500)
PMV BLD AUTO: 10.2 FL (ref 6–12)
POTASSIUM BLD-SCNC: 3.2 MMOL/L (ref 3.5–5.2)
PROT SERPL-MCNC: 6.6 G/DL (ref 6–8.5)
PROT UR QL STRIP: NEGATIVE
RBC # BLD AUTO: 5.32 10*6/MM3 (ref 4.6–6)
SODIUM BLD-SCNC: 134 MMOL/L (ref 136–145)
SP GR UR STRIP: >=1.03 (ref 1–1.03)
UROBILINOGEN UR QL STRIP: ABNORMAL
WBC NRBC COR # BLD: 19.21 10*3/MM3 (ref 4.5–10.7)

## 2018-12-26 PROCEDURE — 81003 URINALYSIS AUTO W/O SCOPE: CPT | Performed by: NURSE PRACTITIONER

## 2018-12-26 PROCEDURE — 99283 EMERGENCY DEPT VISIT LOW MDM: CPT

## 2018-12-26 PROCEDURE — 85025 COMPLETE CBC W/AUTO DIFF WBC: CPT | Performed by: NURSE PRACTITIONER

## 2018-12-26 PROCEDURE — 80053 COMPREHEN METABOLIC PANEL: CPT | Performed by: NURSE PRACTITIONER

## 2018-12-26 PROCEDURE — 25010000002 IOPAMIDOL 61 % SOLUTION: Performed by: NURSE PRACTITIONER

## 2018-12-26 PROCEDURE — 74177 CT ABD & PELVIS W/CONTRAST: CPT

## 2018-12-26 RX ORDER — ONDANSETRON 4 MG/1
4 TABLET, ORALLY DISINTEGRATING ORAL EVERY 6 HOURS PRN
Qty: 12 TABLET | Refills: 0 | Status: SHIPPED | OUTPATIENT
Start: 2018-12-26 | End: 2019-03-13

## 2018-12-26 RX ORDER — SODIUM CHLORIDE 0.9 % (FLUSH) 0.9 %
10 SYRINGE (ML) INJECTION AS NEEDED
Status: DISCONTINUED | OUTPATIENT
Start: 2018-12-26 | End: 2018-12-26 | Stop reason: HOSPADM

## 2018-12-26 RX ADMIN — SODIUM CHLORIDE 1000 ML: 9 INJECTION, SOLUTION INTRAVENOUS at 11:37

## 2018-12-26 RX ADMIN — IOPAMIDOL 85 ML: 612 INJECTION, SOLUTION INTRAVENOUS at 11:51

## 2019-01-17 ENCOUNTER — OFFICE VISIT (OUTPATIENT)
Dept: ORTHOPEDIC SURGERY | Facility: CLINIC | Age: 74
End: 2019-01-17

## 2019-01-17 VITALS — HEIGHT: 72 IN | WEIGHT: 245 LBS | BODY MASS INDEX: 33.18 KG/M2 | TEMPERATURE: 98.3 F

## 2019-01-17 DIAGNOSIS — M16.11 PRIMARY OSTEOARTHRITIS OF RIGHT HIP: ICD-10-CM

## 2019-01-17 DIAGNOSIS — M25.552 HIP PAIN, BILATERAL: Primary | ICD-10-CM

## 2019-01-17 DIAGNOSIS — M25.551 HIP PAIN, BILATERAL: Primary | ICD-10-CM

## 2019-01-17 PROCEDURE — 99213 OFFICE O/P EST LOW 20 MIN: CPT | Performed by: ORTHOPAEDIC SURGERY

## 2019-01-17 PROCEDURE — 73521 X-RAY EXAM HIPS BI 2 VIEWS: CPT | Performed by: ORTHOPAEDIC SURGERY

## 2019-01-24 RX ORDER — METFORMIN HYDROCHLORIDE 500 MG/1
TABLET, EXTENDED RELEASE ORAL
Qty: 180 TABLET | Refills: 1 | Status: SHIPPED | OUTPATIENT
Start: 2019-01-24 | End: 2019-01-24 | Stop reason: ALTCHOICE

## 2019-03-05 DIAGNOSIS — E11.40 CONTROLLED TYPE 2 DIABETES MELLITUS WITH DIABETIC NEUROPATHY, WITHOUT LONG-TERM CURRENT USE OF INSULIN (HCC): ICD-10-CM

## 2019-03-05 DIAGNOSIS — I10 ESSENTIAL HYPERTENSION: ICD-10-CM

## 2019-03-05 DIAGNOSIS — K21.00 GASTROESOPHAGEAL REFLUX DISEASE WITH ESOPHAGITIS: ICD-10-CM

## 2019-03-07 LAB
ALBUMIN SERPL-MCNC: 4.2 G/DL (ref 3.5–5.2)
ALBUMIN/GLOB SERPL: 1.4 G/DL
ALP SERPL-CCNC: 74 U/L (ref 39–117)
ALT SERPL-CCNC: 33 U/L (ref 1–41)
AST SERPL-CCNC: 27 U/L (ref 1–40)
BILIRUB SERPL-MCNC: 0.3 MG/DL (ref 0.1–1.2)
BUN SERPL-MCNC: 11 MG/DL (ref 8–23)
BUN/CREAT SERPL: 10.4 (ref 7–25)
CALCIUM SERPL-MCNC: 9.7 MG/DL (ref 8.6–10.5)
CHLORIDE SERPL-SCNC: 100 MMOL/L (ref 98–107)
CHOLEST SERPL-MCNC: 184 MG/DL (ref 0–200)
CO2 SERPL-SCNC: 28 MMOL/L (ref 22–29)
CREAT SERPL-MCNC: 1.06 MG/DL (ref 0.76–1.27)
GLOBULIN SER CALC-MCNC: 2.9 GM/DL
GLUCOSE SERPL-MCNC: 163 MG/DL (ref 65–99)
HBA1C MFR BLD: 8.11 % (ref 4.8–5.6)
HDLC SERPL-MCNC: 33 MG/DL (ref 40–60)
LDLC SERPL CALC-MCNC: 110 MG/DL (ref 0–100)
LDLC/HDLC SERPL: 3.35 {RATIO}
POTASSIUM SERPL-SCNC: 4 MMOL/L (ref 3.5–5.2)
PROT SERPL-MCNC: 7.1 G/DL (ref 6–8.5)
PSA SERPL-MCNC: 1.57 NG/ML (ref 0–4)
SODIUM SERPL-SCNC: 143 MMOL/L (ref 136–145)
TRIGL SERPL-MCNC: 203 MG/DL (ref 0–150)
TSH SERPL DL<=0.005 MIU/L-ACNC: 3.19 MIU/ML (ref 0.27–4.2)
VLDLC SERPL CALC-MCNC: 40.6 MG/DL (ref 5–40)

## 2019-03-12 RX ORDER — METOPROLOL SUCCINATE 25 MG/1
TABLET, EXTENDED RELEASE ORAL
Qty: 30 TABLET | Refills: 3 | Status: SHIPPED | OUTPATIENT
Start: 2019-03-12 | End: 2019-04-04 | Stop reason: HOSPADM

## 2019-03-13 ENCOUNTER — OFFICE VISIT (OUTPATIENT)
Dept: INTERNAL MEDICINE | Facility: CLINIC | Age: 74
End: 2019-03-13

## 2019-03-13 VITALS
DIASTOLIC BLOOD PRESSURE: 64 MMHG | OXYGEN SATURATION: 98 % | HEART RATE: 67 BPM | BODY MASS INDEX: 34.13 KG/M2 | HEIGHT: 72 IN | SYSTOLIC BLOOD PRESSURE: 148 MMHG | WEIGHT: 252 LBS

## 2019-03-13 DIAGNOSIS — E11.40 CONTROLLED TYPE 2 DIABETES MELLITUS WITH DIABETIC NEUROPATHY, WITHOUT LONG-TERM CURRENT USE OF INSULIN (HCC): Primary | ICD-10-CM

## 2019-03-13 DIAGNOSIS — F51.01 PRIMARY INSOMNIA: ICD-10-CM

## 2019-03-13 DIAGNOSIS — I10 ESSENTIAL HYPERTENSION: ICD-10-CM

## 2019-03-13 DIAGNOSIS — H91.90 HEARING LOSS, UNSPECIFIED HEARING LOSS TYPE, UNSPECIFIED LATERALITY: ICD-10-CM

## 2019-03-13 PROCEDURE — 99214 OFFICE O/P EST MOD 30 MIN: CPT | Performed by: INTERNAL MEDICINE

## 2019-03-13 RX ORDER — METFORMIN HYDROCHLORIDE 500 MG/1
1 TABLET, EXTENDED RELEASE ORAL 2 TIMES DAILY
COMMUNITY
Start: 2019-01-24 | End: 2019-04-04 | Stop reason: HOSPADM

## 2019-04-01 ENCOUNTER — HOSPITAL ENCOUNTER (INPATIENT)
Facility: HOSPITAL | Age: 74
LOS: 3 days | Discharge: HOME HEALTH | End: 2019-04-04
Attending: EMERGENCY MEDICINE | Admitting: HOSPITALIST

## 2019-04-01 ENCOUNTER — APPOINTMENT (OUTPATIENT)
Dept: CT IMAGING | Facility: HOSPITAL | Age: 74
End: 2019-04-01

## 2019-04-01 ENCOUNTER — APPOINTMENT (OUTPATIENT)
Dept: GENERAL RADIOLOGY | Facility: HOSPITAL | Age: 74
End: 2019-04-01

## 2019-04-01 ENCOUNTER — APPOINTMENT (OUTPATIENT)
Dept: CARDIOLOGY | Facility: HOSPITAL | Age: 74
End: 2019-04-01

## 2019-04-01 DIAGNOSIS — R41.0 DELIRIUM: Primary | ICD-10-CM

## 2019-04-01 DIAGNOSIS — R47.01 APHASIA: ICD-10-CM

## 2019-04-01 DIAGNOSIS — R26.2 DIFFICULTY WALKING: ICD-10-CM

## 2019-04-01 DIAGNOSIS — I48.0 PAROXYSMAL ATRIAL FIBRILLATION (HCC): ICD-10-CM

## 2019-04-01 DIAGNOSIS — R07.9 CHEST PAIN, UNSPECIFIED TYPE: ICD-10-CM

## 2019-04-01 PROBLEM — R47.02 DYSPHASIA: Status: ACTIVE | Noted: 2019-04-01

## 2019-04-01 PROBLEM — R79.89 ELEVATED D-DIMER: Status: ACTIVE | Noted: 2019-04-01

## 2019-04-01 PROBLEM — R07.89 CHEST PAIN, ATYPICAL: Status: ACTIVE | Noted: 2019-04-01

## 2019-04-01 PROBLEM — R11.2 NAUSEA AND VOMITING: Status: ACTIVE | Noted: 2019-04-01

## 2019-04-01 LAB
ABO GROUP BLD: NORMAL
ALBUMIN SERPL-MCNC: 4 G/DL (ref 3.5–5.2)
ALBUMIN/GLOB SERPL: 1.2 G/DL
ALP SERPL-CCNC: 80 U/L (ref 39–117)
ALT SERPL W P-5'-P-CCNC: 36 U/L (ref 1–41)
ANION GAP SERPL CALCULATED.3IONS-SCNC: 16.1 MMOL/L
APTT PPP: 26.3 SECONDS (ref 22.7–35.4)
AST SERPL-CCNC: 29 U/L (ref 1–40)
BASOPHILS # BLD AUTO: 0.05 10*3/MM3 (ref 0–0.2)
BASOPHILS # BLD AUTO: 0.07 10*3/MM3 (ref 0–0.2)
BASOPHILS NFR BLD AUTO: 0.3 % (ref 0–1.5)
BASOPHILS NFR BLD AUTO: 0.5 % (ref 0–1.5)
BH CV LOWER VASCULAR LEFT COMMON FEMORAL AUGMENT: NORMAL
BH CV LOWER VASCULAR LEFT COMMON FEMORAL COMPETENT: NORMAL
BH CV LOWER VASCULAR LEFT COMMON FEMORAL COMPRESS: NORMAL
BH CV LOWER VASCULAR LEFT COMMON FEMORAL PHASIC: NORMAL
BH CV LOWER VASCULAR LEFT COMMON FEMORAL SPONT: NORMAL
BH CV LOWER VASCULAR LEFT DISTAL FEMORAL COMPRESS: NORMAL
BH CV LOWER VASCULAR LEFT GASTRONEMIUS COMPRESS: NORMAL
BH CV LOWER VASCULAR LEFT GREATER SAPH AK COMPRESS: NORMAL
BH CV LOWER VASCULAR LEFT GREATER SAPH BK COMPRESS: NORMAL
BH CV LOWER VASCULAR LEFT MID FEMORAL AUGMENT: NORMAL
BH CV LOWER VASCULAR LEFT MID FEMORAL COMPETENT: NORMAL
BH CV LOWER VASCULAR LEFT MID FEMORAL COMPRESS: NORMAL
BH CV LOWER VASCULAR LEFT MID FEMORAL PHASIC: NORMAL
BH CV LOWER VASCULAR LEFT MID FEMORAL SPONT: NORMAL
BH CV LOWER VASCULAR LEFT PERONEAL COMPRESS: NORMAL
BH CV LOWER VASCULAR LEFT POPLITEAL AUGMENT: NORMAL
BH CV LOWER VASCULAR LEFT POPLITEAL COMPETENT: NORMAL
BH CV LOWER VASCULAR LEFT POPLITEAL COMPRESS: NORMAL
BH CV LOWER VASCULAR LEFT POPLITEAL PHASIC: NORMAL
BH CV LOWER VASCULAR LEFT POPLITEAL SPONT: NORMAL
BH CV LOWER VASCULAR LEFT POSTERIOR TIBIAL COMPRESS: NORMAL
BH CV LOWER VASCULAR LEFT PROXIMAL FEMORAL COMPRESS: NORMAL
BH CV LOWER VASCULAR LEFT SAPHENOFEMORAL JUNCTION AUGMENT: NORMAL
BH CV LOWER VASCULAR LEFT SAPHENOFEMORAL JUNCTION COMPRESS: NORMAL
BH CV LOWER VASCULAR LEFT SAPHENOFEMORAL JUNCTION PHASIC: NORMAL
BH CV LOWER VASCULAR LEFT SAPHENOFEMORAL JUNCTION SPONT: NORMAL
BH CV LOWER VASCULAR RIGHT COMMON FEMORAL AUGMENT: NORMAL
BH CV LOWER VASCULAR RIGHT COMMON FEMORAL COMPETENT: NORMAL
BH CV LOWER VASCULAR RIGHT COMMON FEMORAL COMPRESS: NORMAL
BH CV LOWER VASCULAR RIGHT COMMON FEMORAL PHASIC: NORMAL
BH CV LOWER VASCULAR RIGHT COMMON FEMORAL SPONT: NORMAL
BH CV LOWER VASCULAR RIGHT DISTAL FEMORAL COMPRESS: NORMAL
BH CV LOWER VASCULAR RIGHT GASTRONEMIUS COMPRESS: NORMAL
BH CV LOWER VASCULAR RIGHT GREATER SAPH AK COMPRESS: NORMAL
BH CV LOWER VASCULAR RIGHT GREATER SAPH BK COMPRESS: NORMAL
BH CV LOWER VASCULAR RIGHT MID FEMORAL AUGMENT: NORMAL
BH CV LOWER VASCULAR RIGHT MID FEMORAL COMPETENT: NORMAL
BH CV LOWER VASCULAR RIGHT MID FEMORAL COMPRESS: NORMAL
BH CV LOWER VASCULAR RIGHT MID FEMORAL PHASIC: NORMAL
BH CV LOWER VASCULAR RIGHT MID FEMORAL SPONT: NORMAL
BH CV LOWER VASCULAR RIGHT PERONEAL COMPRESS: NORMAL
BH CV LOWER VASCULAR RIGHT POPLITEAL AUGMENT: NORMAL
BH CV LOWER VASCULAR RIGHT POPLITEAL COMPETENT: NORMAL
BH CV LOWER VASCULAR RIGHT POPLITEAL COMPRESS: NORMAL
BH CV LOWER VASCULAR RIGHT POPLITEAL PHASIC: NORMAL
BH CV LOWER VASCULAR RIGHT POPLITEAL SPONT: NORMAL
BH CV LOWER VASCULAR RIGHT POSTERIOR TIBIAL COMPRESS: NORMAL
BH CV LOWER VASCULAR RIGHT PROXIMAL FEMORAL COMPRESS: NORMAL
BH CV LOWER VASCULAR RIGHT SAPHENOFEMORAL JUNCTION AUGMENT: NORMAL
BH CV LOWER VASCULAR RIGHT SAPHENOFEMORAL JUNCTION COMPRESS: NORMAL
BH CV LOWER VASCULAR RIGHT SAPHENOFEMORAL JUNCTION PHASIC: NORMAL
BH CV LOWER VASCULAR RIGHT SAPHENOFEMORAL JUNCTION SPONT: NORMAL
BILIRUB SERPL-MCNC: 0.3 MG/DL (ref 0.2–1.2)
BLD GP AB SCN SERPL QL: NEGATIVE
BUN BLD-MCNC: 12 MG/DL (ref 8–23)
BUN/CREAT SERPL: 11.2 (ref 7–25)
CALCIUM SPEC-SCNC: 9.2 MG/DL (ref 8.6–10.5)
CHLORIDE SERPL-SCNC: 98 MMOL/L (ref 98–107)
CO2 SERPL-SCNC: 24.9 MMOL/L (ref 22–29)
CREAT BLD-MCNC: 1.07 MG/DL (ref 0.76–1.27)
D DIMER PPP FEU-MCNC: 1.05 MCGFEU/ML (ref 0–0.49)
DEPRECATED RDW RBC AUTO: 39.5 FL (ref 37–54)
DEPRECATED RDW RBC AUTO: 39.5 FL (ref 37–54)
EOSINOPHIL # BLD AUTO: 0.01 10*3/MM3 (ref 0–0.4)
EOSINOPHIL # BLD AUTO: 0.49 10*3/MM3 (ref 0–0.4)
EOSINOPHIL NFR BLD AUTO: 0.1 % (ref 0.3–6.2)
EOSINOPHIL NFR BLD AUTO: 3.5 % (ref 0.3–6.2)
ERYTHROCYTE [DISTWIDTH] IN BLOOD BY AUTOMATED COUNT: 12.5 % (ref 12.3–15.4)
ERYTHROCYTE [DISTWIDTH] IN BLOOD BY AUTOMATED COUNT: 12.7 % (ref 12.3–15.4)
GFR SERPL CREATININE-BSD FRML MDRD: 68 ML/MIN/1.73
GLOBULIN UR ELPH-MCNC: 3.4 GM/DL
GLUCOSE BLD-MCNC: 233 MG/DL (ref 65–99)
GLUCOSE BLDC GLUCOMTR-MCNC: 184 MG/DL (ref 70–130)
GLUCOSE BLDC GLUCOMTR-MCNC: 188 MG/DL (ref 70–130)
GLUCOSE BLDC GLUCOMTR-MCNC: 233 MG/DL (ref 70–130)
HBA1C MFR BLD: 8.16 % (ref 4.8–5.6)
HCT VFR BLD AUTO: 45 % (ref 37.5–51)
HCT VFR BLD AUTO: 45.7 % (ref 37.5–51)
HGB BLD-MCNC: 14.7 G/DL (ref 13–17.7)
HGB BLD-MCNC: 14.9 G/DL (ref 13–17.7)
HOLD SPECIMEN: NORMAL
IMM GRANULOCYTES # BLD AUTO: 0.09 10*3/MM3 (ref 0–0.05)
IMM GRANULOCYTES # BLD AUTO: 0.09 10*3/MM3 (ref 0–0.05)
IMM GRANULOCYTES NFR BLD AUTO: 0.5 % (ref 0–0.5)
IMM GRANULOCYTES NFR BLD AUTO: 0.6 % (ref 0–0.5)
INR PPP: 1.11 (ref 0.9–1.1)
LIPASE SERPL-CCNC: 25 U/L (ref 13–60)
LYMPHOCYTES # BLD AUTO: 1.58 10*3/MM3 (ref 0.7–3.1)
LYMPHOCYTES # BLD AUTO: 2.57 10*3/MM3 (ref 0.7–3.1)
LYMPHOCYTES NFR BLD AUTO: 18.3 % (ref 19.6–45.3)
LYMPHOCYTES NFR BLD AUTO: 8.7 % (ref 19.6–45.3)
MCH RBC QN AUTO: 27.7 PG (ref 26.6–33)
MCH RBC QN AUTO: 28.7 PG (ref 26.6–33)
MCHC RBC AUTO-ENTMCNC: 32.2 G/DL (ref 31.5–35.7)
MCHC RBC AUTO-ENTMCNC: 33.1 G/DL (ref 31.5–35.7)
MCV RBC AUTO: 86.2 FL (ref 79–97)
MCV RBC AUTO: 86.7 FL (ref 79–97)
MONOCYTES # BLD AUTO: 0.84 10*3/MM3 (ref 0.1–0.9)
MONOCYTES # BLD AUTO: 1.56 10*3/MM3 (ref 0.1–0.9)
MONOCYTES NFR BLD AUTO: 6 % (ref 5–12)
MONOCYTES NFR BLD AUTO: 8.6 % (ref 5–12)
NEUTROPHILS # BLD AUTO: 10 10*3/MM3 (ref 1.4–7)
NEUTROPHILS # BLD AUTO: 14.84 10*3/MM3 (ref 1.4–7)
NEUTROPHILS NFR BLD AUTO: 71.1 % (ref 42.7–76)
NEUTROPHILS NFR BLD AUTO: 81.8 % (ref 42.7–76)
NRBC BLD AUTO-RTO: 0 /100 WBC (ref 0–0)
NRBC BLD AUTO-RTO: 0.1 /100 WBC (ref 0–0)
PLATELET # BLD AUTO: 219 10*3/MM3 (ref 140–450)
PLATELET # BLD AUTO: 233 10*3/MM3 (ref 140–450)
PMV BLD AUTO: 10.7 FL (ref 6–12)
PMV BLD AUTO: 10.9 FL (ref 6–12)
POTASSIUM BLD-SCNC: 4 MMOL/L (ref 3.5–5.2)
PROCALCITONIN SERPL-MCNC: 0.21 NG/ML (ref 0.1–0.25)
PROT SERPL-MCNC: 7.4 G/DL (ref 6–8.5)
PROTHROMBIN TIME: 14 SECONDS (ref 11.7–14.2)
RBC # BLD AUTO: 5.19 10*6/MM3 (ref 4.14–5.8)
RBC # BLD AUTO: 5.3 10*6/MM3 (ref 4.14–5.8)
RH BLD: POSITIVE
SODIUM BLD-SCNC: 139 MMOL/L (ref 136–145)
T&S EXPIRATION DATE: NORMAL
TROPONIN T SERPL-MCNC: <0.01 NG/ML (ref 0–0.03)
TSH SERPL DL<=0.05 MIU/L-ACNC: 1.6 MIU/ML (ref 0.27–4.2)
WBC NRBC COR # BLD: 14.06 10*3/MM3 (ref 3.4–10.8)
WBC NRBC COR # BLD: 18.13 10*3/MM3 (ref 3.4–10.8)
WHOLE BLOOD HOLD SPECIMEN: NORMAL
WHOLE BLOOD HOLD SPECIMEN: NORMAL

## 2019-04-01 PROCEDURE — 70498 CT ANGIOGRAPHY NECK: CPT

## 2019-04-01 PROCEDURE — 99223 1ST HOSP IP/OBS HIGH 75: CPT | Performed by: PSYCHIATRY & NEUROLOGY

## 2019-04-01 PROCEDURE — 93010 ELECTROCARDIOGRAM REPORT: CPT | Performed by: INTERNAL MEDICINE

## 2019-04-01 PROCEDURE — 99222 1ST HOSP IP/OBS MODERATE 55: CPT | Performed by: NURSE PRACTITIONER

## 2019-04-01 PROCEDURE — 85379 FIBRIN DEGRADATION QUANT: CPT | Performed by: PHYSICIAN ASSISTANT

## 2019-04-01 PROCEDURE — 70496 CT ANGIOGRAPHY HEAD: CPT

## 2019-04-01 PROCEDURE — 85025 COMPLETE CBC W/AUTO DIFF WBC: CPT | Performed by: PHYSICIAN ASSISTANT

## 2019-04-01 PROCEDURE — 93005 ELECTROCARDIOGRAM TRACING: CPT | Performed by: EMERGENCY MEDICINE

## 2019-04-01 PROCEDURE — 84484 ASSAY OF TROPONIN QUANT: CPT | Performed by: INTERNAL MEDICINE

## 2019-04-01 PROCEDURE — 82962 GLUCOSE BLOOD TEST: CPT

## 2019-04-01 PROCEDURE — 84443 ASSAY THYROID STIM HORMONE: CPT | Performed by: NURSE PRACTITIONER

## 2019-04-01 PROCEDURE — 93005 ELECTROCARDIOGRAM TRACING: CPT

## 2019-04-01 PROCEDURE — 92610 EVALUATE SWALLOWING FUNCTION: CPT

## 2019-04-01 PROCEDURE — 25010000002 HEPARIN (PORCINE) PER 1000 UNITS: Performed by: PSYCHIATRY & NEUROLOGY

## 2019-04-01 PROCEDURE — 84145 PROCALCITONIN (PCT): CPT | Performed by: NURSE PRACTITIONER

## 2019-04-01 PROCEDURE — 86850 RBC ANTIBODY SCREEN: CPT | Performed by: EMERGENCY MEDICINE

## 2019-04-01 PROCEDURE — 71046 X-RAY EXAM CHEST 2 VIEWS: CPT

## 2019-04-01 PROCEDURE — 93970 EXTREMITY STUDY: CPT

## 2019-04-01 PROCEDURE — 85730 THROMBOPLASTIN TIME PARTIAL: CPT | Performed by: EMERGENCY MEDICINE

## 2019-04-01 PROCEDURE — 99285 EMERGENCY DEPT VISIT HI MDM: CPT

## 2019-04-01 PROCEDURE — 86900 BLOOD TYPING SEROLOGIC ABO: CPT | Performed by: EMERGENCY MEDICINE

## 2019-04-01 PROCEDURE — 85025 COMPLETE CBC W/AUTO DIFF WBC: CPT | Performed by: PSYCHIATRY & NEUROLOGY

## 2019-04-01 PROCEDURE — 0042T HC CT CEREBRAL PERFUSION W/WO CONTRAST: CPT

## 2019-04-01 PROCEDURE — 84484 ASSAY OF TROPONIN QUANT: CPT | Performed by: PHYSICIAN ASSISTANT

## 2019-04-01 PROCEDURE — 83036 HEMOGLOBIN GLYCOSYLATED A1C: CPT | Performed by: NURSE PRACTITIONER

## 2019-04-01 PROCEDURE — 83690 ASSAY OF LIPASE: CPT | Performed by: PHYSICIAN ASSISTANT

## 2019-04-01 PROCEDURE — 85610 PROTHROMBIN TIME: CPT | Performed by: EMERGENCY MEDICINE

## 2019-04-01 PROCEDURE — 63710000001 INSULIN LISPRO (HUMAN) PER 5 UNITS: Performed by: NURSE PRACTITIONER

## 2019-04-01 PROCEDURE — 80053 COMPREHEN METABOLIC PANEL: CPT | Performed by: PHYSICIAN ASSISTANT

## 2019-04-01 PROCEDURE — 36415 COLL VENOUS BLD VENIPUNCTURE: CPT

## 2019-04-01 PROCEDURE — 71275 CT ANGIOGRAPHY CHEST: CPT

## 2019-04-01 PROCEDURE — 86901 BLOOD TYPING SEROLOGIC RH(D): CPT | Performed by: EMERGENCY MEDICINE

## 2019-04-01 PROCEDURE — 0 IOPAMIDOL PER 1 ML: Performed by: EMERGENCY MEDICINE

## 2019-04-01 PROCEDURE — 93005 ELECTROCARDIOGRAM TRACING: CPT | Performed by: NURSE PRACTITIONER

## 2019-04-01 RX ORDER — ENALAPRILAT 2.5 MG/2ML
1.25 INJECTION INTRAVENOUS EVERY 6 HOURS PRN
Status: DISCONTINUED | OUTPATIENT
Start: 2019-04-01 | End: 2019-04-04 | Stop reason: HOSPADM

## 2019-04-01 RX ORDER — NITROGLYCERIN 0.4 MG/1
0.4 TABLET SUBLINGUAL
Status: DISCONTINUED | OUTPATIENT
Start: 2019-04-01 | End: 2019-04-04 | Stop reason: HOSPADM

## 2019-04-01 RX ORDER — ASPIRIN 325 MG
325 TABLET ORAL ONCE
Status: COMPLETED | OUTPATIENT
Start: 2019-04-01 | End: 2019-04-01

## 2019-04-01 RX ORDER — NICOTINE POLACRILEX 4 MG
15 LOZENGE BUCCAL
Status: DISCONTINUED | OUTPATIENT
Start: 2019-04-01 | End: 2019-04-04 | Stop reason: HOSPADM

## 2019-04-01 RX ORDER — LORAZEPAM 2 MG/ML
0.5 INJECTION INTRAMUSCULAR ONCE
Status: COMPLETED | OUTPATIENT
Start: 2019-04-01 | End: 2019-04-02

## 2019-04-01 RX ORDER — SODIUM CHLORIDE 9 MG/ML
50 INJECTION, SOLUTION INTRAVENOUS CONTINUOUS
Status: DISCONTINUED | OUTPATIENT
Start: 2019-04-01 | End: 2019-04-04 | Stop reason: HOSPADM

## 2019-04-01 RX ORDER — METOPROLOL SUCCINATE 25 MG/1
25 TABLET, EXTENDED RELEASE ORAL DAILY
Status: DISCONTINUED | OUTPATIENT
Start: 2019-04-01 | End: 2019-04-02

## 2019-04-01 RX ORDER — LABETALOL HYDROCHLORIDE 5 MG/ML
20 INJECTION, SOLUTION INTRAVENOUS
Status: DISCONTINUED | OUTPATIENT
Start: 2019-04-01 | End: 2019-04-04 | Stop reason: HOSPADM

## 2019-04-01 RX ORDER — PANTOPRAZOLE SODIUM 40 MG/1
40 TABLET, DELAYED RELEASE ORAL DAILY
Status: DISCONTINUED | OUTPATIENT
Start: 2019-04-01 | End: 2019-04-04 | Stop reason: HOSPADM

## 2019-04-01 RX ORDER — ASPIRIN 81 MG/1
81 TABLET, CHEWABLE ORAL DAILY
Status: DISCONTINUED | OUTPATIENT
Start: 2019-04-01 | End: 2019-04-03

## 2019-04-01 RX ORDER — ATORVASTATIN CALCIUM 80 MG/1
80 TABLET, FILM COATED ORAL NIGHTLY
Status: DISCONTINUED | OUTPATIENT
Start: 2019-04-01 | End: 2019-04-04

## 2019-04-01 RX ORDER — ONDANSETRON 2 MG/ML
4 INJECTION INTRAMUSCULAR; INTRAVENOUS EVERY 6 HOURS PRN
Status: DISCONTINUED | OUTPATIENT
Start: 2019-04-01 | End: 2019-04-04 | Stop reason: HOSPADM

## 2019-04-01 RX ORDER — DEXTROSE MONOHYDRATE 25 G/50ML
25 INJECTION, SOLUTION INTRAVENOUS
Status: DISCONTINUED | OUTPATIENT
Start: 2019-04-01 | End: 2019-04-04 | Stop reason: HOSPADM

## 2019-04-01 RX ORDER — SODIUM CHLORIDE 0.9 % (FLUSH) 0.9 %
3 SYRINGE (ML) INJECTION EVERY 12 HOURS SCHEDULED
Status: DISCONTINUED | OUTPATIENT
Start: 2019-04-01 | End: 2019-04-04 | Stop reason: HOSPADM

## 2019-04-01 RX ORDER — ACETAMINOPHEN 325 MG/1
650 TABLET ORAL EVERY 4 HOURS PRN
Status: DISCONTINUED | OUTPATIENT
Start: 2019-04-01 | End: 2019-04-04 | Stop reason: HOSPADM

## 2019-04-01 RX ORDER — ALUMINA, MAGNESIA, AND SIMETHICONE 2400; 2400; 240 MG/30ML; MG/30ML; MG/30ML
15 SUSPENSION ORAL ONCE
Status: COMPLETED | OUTPATIENT
Start: 2019-04-01 | End: 2019-04-01

## 2019-04-01 RX ORDER — HEPARIN SODIUM 10000 [USP'U]/100ML
8.55 INJECTION, SOLUTION INTRAVENOUS
Status: DISCONTINUED | OUTPATIENT
Start: 2019-04-01 | End: 2019-04-03

## 2019-04-01 RX ORDER — NITROGLYCERIN 0.4 MG/1
0.4 TABLET SUBLINGUAL
Status: DISCONTINUED | OUTPATIENT
Start: 2019-04-01 | End: 2019-04-01

## 2019-04-01 RX ORDER — SODIUM CHLORIDE 0.9 % (FLUSH) 0.9 %
3-10 SYRINGE (ML) INJECTION AS NEEDED
Status: DISCONTINUED | OUTPATIENT
Start: 2019-04-01 | End: 2019-04-04 | Stop reason: HOSPADM

## 2019-04-01 RX ORDER — LEVOTHYROXINE SODIUM 0.05 MG/1
50 TABLET ORAL DAILY
Status: DISCONTINUED | OUTPATIENT
Start: 2019-04-01 | End: 2019-04-04 | Stop reason: HOSPADM

## 2019-04-01 RX ADMIN — PANTOPRAZOLE SODIUM 40 MG: 40 TABLET, DELAYED RELEASE ORAL at 13:36

## 2019-04-01 RX ADMIN — INSULIN LISPRO 2 UNITS: 100 INJECTION, SOLUTION INTRAVENOUS; SUBCUTANEOUS at 17:50

## 2019-04-01 RX ADMIN — LEVOTHYROXINE SODIUM 50 MCG: 50 TABLET ORAL at 13:35

## 2019-04-01 RX ADMIN — ATORVASTATIN CALCIUM 80 MG: 80 TABLET, FILM COATED ORAL at 21:29

## 2019-04-01 RX ADMIN — METOPROLOL SUCCINATE 25 MG: 25 TABLET, FILM COATED, EXTENDED RELEASE ORAL at 13:36

## 2019-04-01 RX ADMIN — IOPAMIDOL 150 ML: 755 INJECTION, SOLUTION INTRAVENOUS at 05:53

## 2019-04-01 RX ADMIN — IOPAMIDOL 95 ML: 755 INJECTION, SOLUTION INTRAVENOUS at 05:46

## 2019-04-01 RX ADMIN — LIDOCAINE HYDROCHLORIDE 15 ML: 20 SOLUTION ORAL; TOPICAL at 06:09

## 2019-04-01 RX ADMIN — HEPARIN SODIUM 8.55 UNITS/KG/HR: 10000 INJECTION, SOLUTION INTRAVENOUS at 13:23

## 2019-04-01 RX ADMIN — NITROGLYCERIN 0.4 MG: 0.4 TABLET, ORALLY DISINTEGRATING SUBLINGUAL at 13:36

## 2019-04-01 RX ADMIN — INSULIN LISPRO 2 UNITS: 100 INJECTION, SOLUTION INTRAVENOUS; SUBCUTANEOUS at 21:29

## 2019-04-01 RX ADMIN — SODIUM CHLORIDE 100 ML/HR: 9 INJECTION, SOLUTION INTRAVENOUS at 13:23

## 2019-04-01 RX ADMIN — ALUMINUM HYDROXIDE, MAGNESIUM HYDROXIDE, AND DIMETHICONE 15 ML: 400; 400; 40 SUSPENSION ORAL at 06:09

## 2019-04-01 RX ADMIN — INSULIN LISPRO 6 UNITS: 100 INJECTION, SOLUTION INTRAVENOUS; SUBCUTANEOUS at 13:35

## 2019-04-01 RX ADMIN — ASPIRIN 81 MG: 81 TABLET, CHEWABLE ORAL at 13:35

## 2019-04-01 RX ADMIN — ASPIRIN 325 MG: 325 TABLET ORAL at 06:39

## 2019-04-02 ENCOUNTER — APPOINTMENT (OUTPATIENT)
Dept: MRI IMAGING | Facility: HOSPITAL | Age: 74
End: 2019-04-02

## 2019-04-02 ENCOUNTER — APPOINTMENT (OUTPATIENT)
Dept: CARDIOLOGY | Facility: HOSPITAL | Age: 74
End: 2019-04-02

## 2019-04-02 ENCOUNTER — APPOINTMENT (OUTPATIENT)
Dept: CT IMAGING | Facility: HOSPITAL | Age: 74
End: 2019-04-02

## 2019-04-02 PROBLEM — R26.0 ATAXIC GAIT: Status: ACTIVE | Noted: 2019-04-02

## 2019-04-02 PROBLEM — G93.41 ENCEPHALOPATHY, METABOLIC: Status: ACTIVE | Noted: 2019-04-01

## 2019-04-02 PROBLEM — R11.2 NAUSEA AND VOMITING: Status: RESOLVED | Noted: 2019-04-01 | Resolved: 2019-04-02

## 2019-04-02 PROBLEM — R07.89 CHEST PAIN, ATYPICAL: Status: RESOLVED | Noted: 2019-04-01 | Resolved: 2019-04-02

## 2019-04-02 PROBLEM — R94.31 ABNORMAL EKG: Status: ACTIVE | Noted: 2019-04-02

## 2019-04-02 LAB
AMMONIA BLD-SCNC: 35 UMOL/L (ref 16–60)
ANION GAP SERPL CALCULATED.3IONS-SCNC: 13.4 MMOL/L
AORTIC DIMENSIONLESS INDEX: 1.1 (DI)
APTT PPP: 36.4 SECONDS (ref 22.7–35.4)
APTT PPP: 39.2 SECONDS (ref 22.7–35.4)
APTT PPP: 44.3 SECONDS (ref 22.7–35.4)
BASOPHILS # BLD AUTO: 0.04 10*3/MM3 (ref 0–0.2)
BASOPHILS NFR BLD AUTO: 0.2 % (ref 0–1.5)
BH CV ECHO MEAS - AO MAX PG (FULL): 0.25 MMHG
BH CV ECHO MEAS - AO MAX PG: 9.9 MMHG
BH CV ECHO MEAS - AO MEAN PG (FULL): 0 MMHG
BH CV ECHO MEAS - AO MEAN PG: 5 MMHG
BH CV ECHO MEAS - AO ROOT AREA (BSA CORRECTED): 1.5
BH CV ECHO MEAS - AO ROOT AREA: 10.2 CM^2
BH CV ECHO MEAS - AO ROOT DIAM: 3.6 CM
BH CV ECHO MEAS - AO V2 MAX: 157 CM/SEC
BH CV ECHO MEAS - AO V2 MEAN: 107 CM/SEC
BH CV ECHO MEAS - AO V2 VTI: 20.9 CM
BH CV ECHO MEAS - ASC AORTA: 3.8 CM
BH CV ECHO MEAS - AVA(I,A): 3.5 CM^2
BH CV ECHO MEAS - AVA(I,D): 3.5 CM^2
BH CV ECHO MEAS - AVA(V,A): 3.1 CM^2
BH CV ECHO MEAS - AVA(V,D): 3.1 CM^2
BH CV ECHO MEAS - BSA(HAYCOCK): 2.5 M^2
BH CV ECHO MEAS - BSA: 2.4 M^2
BH CV ECHO MEAS - BZI_BMI: 35 KILOGRAMS/M^2
BH CV ECHO MEAS - BZI_METRIC_HEIGHT: 182.9 CM
BH CV ECHO MEAS - BZI_METRIC_WEIGHT: 117 KG
BH CV ECHO MEAS - EDV(CUBED): 148.9 ML
BH CV ECHO MEAS - EDV(MOD-SP2): 60 ML
BH CV ECHO MEAS - EDV(MOD-SP4): 80 ML
BH CV ECHO MEAS - EDV(TEICH): 135.3 ML
BH CV ECHO MEAS - EF(CUBED): 66 %
BH CV ECHO MEAS - EF(MOD-BP): 68 %
BH CV ECHO MEAS - EF(MOD-SP2): 63.3 %
BH CV ECHO MEAS - EF(MOD-SP4): 70 %
BH CV ECHO MEAS - EF(TEICH): 57.1 %
BH CV ECHO MEAS - ESV(CUBED): 50.7 ML
BH CV ECHO MEAS - ESV(MOD-SP2): 22 ML
BH CV ECHO MEAS - ESV(MOD-SP4): 24 ML
BH CV ECHO MEAS - ESV(TEICH): 58.1 ML
BH CV ECHO MEAS - FS: 30.2 %
BH CV ECHO MEAS - IVS/LVPW: 1.1
BH CV ECHO MEAS - IVSD: 1.4 CM
BH CV ECHO MEAS - LAT PEAK E' VEL: 12 CM/SEC
BH CV ECHO MEAS - LV DIASTOLIC VOL/BSA (35-75): 33.7 ML/M^2
BH CV ECHO MEAS - LV MASS(C)D: 302.7 GRAMS
BH CV ECHO MEAS - LV MASS(C)DI: 127.5 GRAMS/M^2
BH CV ECHO MEAS - LV MAX PG: 9.6 MMHG
BH CV ECHO MEAS - LV MEAN PG: 5 MMHG
BH CV ECHO MEAS - LV SYSTOLIC VOL/BSA (12-30): 10.1 ML/M^2
BH CV ECHO MEAS - LV V1 MAX: 155 CM/SEC
BH CV ECHO MEAS - LV V1 MEAN: 104 CM/SEC
BH CV ECHO MEAS - LV V1 VTI: 23.2 CM
BH CV ECHO MEAS - LVIDD: 5.3 CM
BH CV ECHO MEAS - LVIDS: 3.7 CM
BH CV ECHO MEAS - LVLD AP2: 7.4 CM
BH CV ECHO MEAS - LVLD AP4: 8.2 CM
BH CV ECHO MEAS - LVLS AP2: 6.8 CM
BH CV ECHO MEAS - LVLS AP4: 7 CM
BH CV ECHO MEAS - LVOT AREA (M): 3.1 CM^2
BH CV ECHO MEAS - LVOT AREA: 3.1 CM^2
BH CV ECHO MEAS - LVOT DIAM: 2 CM
BH CV ECHO MEAS - LVPWD: 1.3 CM
BH CV ECHO MEAS - MED PEAK E' VEL: 9 CM/SEC
BH CV ECHO MEAS - MV DEC SLOPE: 852 CM/SEC^2
BH CV ECHO MEAS - MV DEC TIME: 0.17 SEC
BH CV ECHO MEAS - MV E MAX VEL: 94.1 CM/SEC
BH CV ECHO MEAS - MV MAX PG: 5 MMHG
BH CV ECHO MEAS - MV MEAN PG: 2 MMHG
BH CV ECHO MEAS - MV P1/2T MAX VEL: 117 CM/SEC
BH CV ECHO MEAS - MV P1/2T: 40.2 MSEC
BH CV ECHO MEAS - MV V2 MAX: 112 CM/SEC
BH CV ECHO MEAS - MV V2 MEAN: 68.2 CM/SEC
BH CV ECHO MEAS - MV V2 VTI: 13.6 CM
BH CV ECHO MEAS - MVA P1/2T LCG: 1.9 CM^2
BH CV ECHO MEAS - MVA(P1/2T): 5.5 CM^2
BH CV ECHO MEAS - MVA(VTI): 5.4 CM^2
BH CV ECHO MEAS - PA ACC TIME: 0.07 SEC
BH CV ECHO MEAS - PA MAX PG (FULL): 5.9 MMHG
BH CV ECHO MEAS - PA MAX PG: 10.6 MMHG
BH CV ECHO MEAS - PA PR(ACCEL): 49.3 MMHG
BH CV ECHO MEAS - PA V2 MAX: 163 CM/SEC
BH CV ECHO MEAS - PVA(V,A): 2.3 CM^2
BH CV ECHO MEAS - PVA(V,D): 2.3 CM^2
BH CV ECHO MEAS - QP/QS: 0.67
BH CV ECHO MEAS - RV MAX PG: 4.8 MMHG
BH CV ECHO MEAS - RV MEAN PG: 2 MMHG
BH CV ECHO MEAS - RV V1 MAX: 109 CM/SEC
BH CV ECHO MEAS - RV V1 MEAN: 69.7 CM/SEC
BH CV ECHO MEAS - RV V1 VTI: 14 CM
BH CV ECHO MEAS - RVOT AREA: 3.5 CM^2
BH CV ECHO MEAS - RVOT DIAM: 2.1 CM
BH CV ECHO MEAS - SI(AO): 89.6 ML/M^2
BH CV ECHO MEAS - SI(CUBED): 41.4 ML/M^2
BH CV ECHO MEAS - SI(LVOT): 30.7 ML/M^2
BH CV ECHO MEAS - SI(MOD-SP2): 16 ML/M^2
BH CV ECHO MEAS - SI(MOD-SP4): 23.6 ML/M^2
BH CV ECHO MEAS - SI(TEICH): 32.5 ML/M^2
BH CV ECHO MEAS - SV(AO): 212.7 ML
BH CV ECHO MEAS - SV(CUBED): 98.2 ML
BH CV ECHO MEAS - SV(LVOT): 72.9 ML
BH CV ECHO MEAS - SV(MOD-SP2): 38 ML
BH CV ECHO MEAS - SV(MOD-SP4): 56 ML
BH CV ECHO MEAS - SV(RVOT): 48.5 ML
BH CV ECHO MEAS - SV(TEICH): 77.2 ML
BH CV ECHO MEAS - TAPSE (>1.6): 2 CM2
BH CV ECHO MEAS - TR MAX VEL: 186 CM/SEC
BH CV ECHO MEASUREMENTS AVERAGE E/E' RATIO: 8.96
BH CV VAS BP LEFT ARM: NORMAL MMHG
BH CV XLRA - RV BASE: 3.3 CM
BH CV XLRA - TDI S': 20 CM/SEC
BUN BLD-MCNC: 11 MG/DL (ref 8–23)
BUN/CREAT SERPL: 12.1 (ref 7–25)
CALCIUM SPEC-SCNC: 8.6 MG/DL (ref 8.6–10.5)
CHLORIDE SERPL-SCNC: 98 MMOL/L (ref 98–107)
CHOLEST SERPL-MCNC: 111 MG/DL (ref 0–200)
CO2 SERPL-SCNC: 26.6 MMOL/L (ref 22–29)
CORTIS SERPL-MCNC: 20.4 MCG/DL
CREAT BLD-MCNC: 0.91 MG/DL (ref 0.76–1.27)
DEPRECATED RDW RBC AUTO: 42.2 FL (ref 37–54)
EOSINOPHIL # BLD AUTO: 0.04 10*3/MM3 (ref 0–0.4)
EOSINOPHIL NFR BLD AUTO: 0.2 % (ref 0.3–6.2)
ERYTHROCYTE [DISTWIDTH] IN BLOOD BY AUTOMATED COUNT: 12.9 % (ref 12.3–15.4)
GFR SERPL CREATININE-BSD FRML MDRD: 82 ML/MIN/1.73
GLUCOSE BLD-MCNC: 188 MG/DL (ref 65–99)
GLUCOSE BLDC GLUCOMTR-MCNC: 148 MG/DL (ref 70–130)
GLUCOSE BLDC GLUCOMTR-MCNC: 180 MG/DL (ref 70–130)
GLUCOSE BLDC GLUCOMTR-MCNC: 182 MG/DL (ref 70–130)
GLUCOSE BLDC GLUCOMTR-MCNC: 202 MG/DL (ref 70–130)
HCT VFR BLD AUTO: 40.9 % (ref 37.5–51)
HDLC SERPL-MCNC: 38 MG/DL (ref 40–60)
HGB BLD-MCNC: 13 G/DL (ref 13–17.7)
IMM GRANULOCYTES # BLD AUTO: 0.09 10*3/MM3 (ref 0–0.05)
IMM GRANULOCYTES NFR BLD AUTO: 0.5 % (ref 0–0.5)
LDLC SERPL CALC-MCNC: 48 MG/DL (ref 0–100)
LDLC/HDLC SERPL: 1.26 {RATIO}
LEFT ATRIUM VOLUME INDEX: 29 ML/M2
LEFT ATRIUM VOLUME: 65 CM3
LV EF 2D ECHO EST: 70 %
LYMPHOCYTES # BLD AUTO: 2.82 10*3/MM3 (ref 0.7–3.1)
LYMPHOCYTES NFR BLD AUTO: 16.2 % (ref 19.6–45.3)
MCH RBC QN AUTO: 28.3 PG (ref 26.6–33)
MCHC RBC AUTO-ENTMCNC: 31.8 G/DL (ref 31.5–35.7)
MCV RBC AUTO: 89.1 FL (ref 79–97)
MONOCYTES # BLD AUTO: 1.63 10*3/MM3 (ref 0.1–0.9)
MONOCYTES NFR BLD AUTO: 9.4 % (ref 5–12)
NEUTROPHILS # BLD AUTO: 12.81 10*3/MM3 (ref 1.4–7)
NEUTROPHILS NFR BLD AUTO: 73.5 % (ref 42.7–76)
NRBC BLD AUTO-RTO: 0 /100 WBC (ref 0–0)
PLATELET # BLD AUTO: 210 10*3/MM3 (ref 140–450)
PMV BLD AUTO: 10.6 FL (ref 6–12)
POTASSIUM BLD-SCNC: 3.9 MMOL/L (ref 3.5–5.2)
RBC # BLD AUTO: 4.59 10*6/MM3 (ref 4.14–5.8)
SODIUM BLD-SCNC: 138 MMOL/L (ref 136–145)
TRIGL SERPL-MCNC: 126 MG/DL (ref 0–150)
TROPONIN T SERPL-MCNC: <0.01 NG/ML (ref 0–0.03)
VIT B12 BLD-MCNC: 575 PG/ML (ref 211–946)
VLDLC SERPL-MCNC: 25.2 MG/DL (ref 5–40)
WBC NRBC COR # BLD: 17.43 10*3/MM3 (ref 3.4–10.8)

## 2019-04-02 PROCEDURE — 93010 ELECTROCARDIOGRAM REPORT: CPT | Performed by: INTERNAL MEDICINE

## 2019-04-02 PROCEDURE — 82607 VITAMIN B-12: CPT | Performed by: NURSE PRACTITIONER

## 2019-04-02 PROCEDURE — 85730 THROMBOPLASTIN TIME PARTIAL: CPT | Performed by: HOSPITALIST

## 2019-04-02 PROCEDURE — 25010000002 HEPARIN (PORCINE) PER 1000 UNITS: Performed by: PSYCHIATRY & NEUROLOGY

## 2019-04-02 PROCEDURE — 84484 ASSAY OF TROPONIN QUANT: CPT | Performed by: NURSE PRACTITIONER

## 2019-04-02 PROCEDURE — 80061 LIPID PANEL: CPT | Performed by: NURSE PRACTITIONER

## 2019-04-02 PROCEDURE — 93306 TTE W/DOPPLER COMPLETE: CPT | Performed by: INTERNAL MEDICINE

## 2019-04-02 PROCEDURE — 97112 NEUROMUSCULAR REEDUCATION: CPT | Performed by: OCCUPATIONAL THERAPIST

## 2019-04-02 PROCEDURE — 82533 TOTAL CORTISOL: CPT | Performed by: NURSE PRACTITIONER

## 2019-04-02 PROCEDURE — 25010000002 LORAZEPAM PER 2 MG: Performed by: HOSPITALIST

## 2019-04-02 PROCEDURE — 87040 BLOOD CULTURE FOR BACTERIA: CPT | Performed by: HOSPITALIST

## 2019-04-02 PROCEDURE — 85730 THROMBOPLASTIN TIME PARTIAL: CPT | Performed by: PSYCHIATRY & NEUROLOGY

## 2019-04-02 PROCEDURE — 82962 GLUCOSE BLOOD TEST: CPT

## 2019-04-02 PROCEDURE — 85025 COMPLETE CBC W/AUTO DIFF WBC: CPT | Performed by: PSYCHIATRY & NEUROLOGY

## 2019-04-02 PROCEDURE — 70450 CT HEAD/BRAIN W/O DYE: CPT

## 2019-04-02 PROCEDURE — 99233 SBSQ HOSP IP/OBS HIGH 50: CPT | Performed by: INTERNAL MEDICINE

## 2019-04-02 PROCEDURE — 63710000001 INSULIN LISPRO (HUMAN) PER 5 UNITS: Performed by: NURSE PRACTITIONER

## 2019-04-02 PROCEDURE — 80048 BASIC METABOLIC PNL TOTAL CA: CPT | Performed by: NURSE PRACTITIONER

## 2019-04-02 PROCEDURE — 93005 ELECTROCARDIOGRAM TRACING: CPT | Performed by: INTERNAL MEDICINE

## 2019-04-02 PROCEDURE — 82140 ASSAY OF AMMONIA: CPT | Performed by: NURSE PRACTITIONER

## 2019-04-02 PROCEDURE — 93306 TTE W/DOPPLER COMPLETE: CPT

## 2019-04-02 PROCEDURE — 99233 SBSQ HOSP IP/OBS HIGH 50: CPT | Performed by: NURSE PRACTITIONER

## 2019-04-02 PROCEDURE — 97166 OT EVAL MOD COMPLEX 45 MIN: CPT | Performed by: OCCUPATIONAL THERAPIST

## 2019-04-02 RX ORDER — METOPROLOL SUCCINATE 25 MG/1
25 TABLET, EXTENDED RELEASE ORAL ONCE
Status: COMPLETED | OUTPATIENT
Start: 2019-04-02 | End: 2019-04-02

## 2019-04-02 RX ORDER — METOPROLOL SUCCINATE 50 MG/1
50 TABLET, EXTENDED RELEASE ORAL DAILY
Status: DISCONTINUED | OUTPATIENT
Start: 2019-04-03 | End: 2019-04-03

## 2019-04-02 RX ADMIN — HEPARIN SODIUM 11.5 UNITS/KG/HR: 10000 INJECTION, SOLUTION INTRAVENOUS at 11:23

## 2019-04-02 RX ADMIN — HEPARIN SODIUM 14.5 UNITS/KG/HR: 10000 INJECTION, SOLUTION INTRAVENOUS at 15:28

## 2019-04-02 RX ADMIN — ATORVASTATIN CALCIUM 80 MG: 80 TABLET, FILM COATED ORAL at 20:54

## 2019-04-02 RX ADMIN — HEPARIN SODIUM 14.5 UNITS/KG/HR: 10000 INJECTION, SOLUTION INTRAVENOUS at 19:21

## 2019-04-02 RX ADMIN — LEVOTHYROXINE SODIUM 50 MCG: 50 TABLET ORAL at 10:38

## 2019-04-02 RX ADMIN — HEPARIN SODIUM 11.5 UNITS/KG/HR: 10000 INJECTION, SOLUTION INTRAVENOUS at 06:31

## 2019-04-02 RX ADMIN — METOPROLOL SUCCINATE 25 MG: 25 TABLET, FILM COATED, EXTENDED RELEASE ORAL at 10:38

## 2019-04-02 RX ADMIN — SODIUM CHLORIDE, PRESERVATIVE FREE 3 ML: 5 INJECTION INTRAVENOUS at 20:54

## 2019-04-02 RX ADMIN — INSULIN LISPRO 4 UNITS: 100 INJECTION, SOLUTION INTRAVENOUS; SUBCUTANEOUS at 13:30

## 2019-04-02 RX ADMIN — LORAZEPAM 0.5 MG: 2 INJECTION INTRAMUSCULAR; INTRAVENOUS at 06:41

## 2019-04-02 RX ADMIN — PANTOPRAZOLE SODIUM 40 MG: 40 TABLET, DELAYED RELEASE ORAL at 10:38

## 2019-04-02 RX ADMIN — SODIUM CHLORIDE, PRESERVATIVE FREE 3 ML: 5 INJECTION INTRAVENOUS at 10:39

## 2019-04-02 RX ADMIN — ASPIRIN 81 MG: 81 TABLET, CHEWABLE ORAL at 10:38

## 2019-04-03 ENCOUNTER — APPOINTMENT (OUTPATIENT)
Dept: GENERAL RADIOLOGY | Facility: HOSPITAL | Age: 74
End: 2019-04-03

## 2019-04-03 LAB
APTT PPP: 56.5 SECONDS (ref 22.7–35.4)
BASOPHILS # BLD AUTO: 0.06 10*3/MM3 (ref 0–0.2)
BASOPHILS NFR BLD AUTO: 0.4 % (ref 0–1.5)
DEPRECATED RDW RBC AUTO: 42.2 FL (ref 37–54)
EOSINOPHIL # BLD AUTO: 0.05 10*3/MM3 (ref 0–0.4)
EOSINOPHIL NFR BLD AUTO: 0.3 % (ref 0.3–6.2)
ERYTHROCYTE [DISTWIDTH] IN BLOOD BY AUTOMATED COUNT: 13 % (ref 12.3–15.4)
GLUCOSE BLDC GLUCOMTR-MCNC: 131 MG/DL (ref 70–130)
GLUCOSE BLDC GLUCOMTR-MCNC: 146 MG/DL (ref 70–130)
GLUCOSE BLDC GLUCOMTR-MCNC: 168 MG/DL (ref 70–130)
GLUCOSE BLDC GLUCOMTR-MCNC: 172 MG/DL (ref 70–130)
HCT VFR BLD AUTO: 39.9 % (ref 37.5–51)
HGB BLD-MCNC: 12.7 G/DL (ref 13–17.7)
IMM GRANULOCYTES # BLD AUTO: 0.13 10*3/MM3 (ref 0–0.05)
IMM GRANULOCYTES NFR BLD AUTO: 0.8 % (ref 0–0.5)
LYMPHOCYTES # BLD AUTO: 2.03 10*3/MM3 (ref 0.7–3.1)
LYMPHOCYTES NFR BLD AUTO: 13.1 % (ref 19.6–45.3)
MCH RBC QN AUTO: 28.2 PG (ref 26.6–33)
MCHC RBC AUTO-ENTMCNC: 31.8 G/DL (ref 31.5–35.7)
MCV RBC AUTO: 88.7 FL (ref 79–97)
MONOCYTES # BLD AUTO: 1.07 10*3/MM3 (ref 0.1–0.9)
MONOCYTES NFR BLD AUTO: 6.9 % (ref 5–12)
NEUTROPHILS # BLD AUTO: 12.15 10*3/MM3 (ref 1.4–7)
NEUTROPHILS NFR BLD AUTO: 78.5 % (ref 42.7–76)
NRBC BLD AUTO-RTO: 0 /100 WBC (ref 0–0)
PLATELET # BLD AUTO: 207 10*3/MM3 (ref 140–450)
PMV BLD AUTO: 10.6 FL (ref 6–12)
RBC # BLD AUTO: 4.5 10*6/MM3 (ref 4.14–5.8)
WBC NRBC COR # BLD: 15.49 10*3/MM3 (ref 3.4–10.8)

## 2019-04-03 PROCEDURE — 97110 THERAPEUTIC EXERCISES: CPT

## 2019-04-03 PROCEDURE — 97110 THERAPEUTIC EXERCISES: CPT | Performed by: OCCUPATIONAL THERAPIST

## 2019-04-03 PROCEDURE — 85025 COMPLETE CBC W/AUTO DIFF WBC: CPT | Performed by: PSYCHIATRY & NEUROLOGY

## 2019-04-03 PROCEDURE — 97535 SELF CARE MNGMENT TRAINING: CPT | Performed by: OCCUPATIONAL THERAPIST

## 2019-04-03 PROCEDURE — 63710000001 INSULIN LISPRO (HUMAN) PER 5 UNITS: Performed by: NURSE PRACTITIONER

## 2019-04-03 PROCEDURE — 71046 X-RAY EXAM CHEST 2 VIEWS: CPT

## 2019-04-03 PROCEDURE — 82962 GLUCOSE BLOOD TEST: CPT

## 2019-04-03 PROCEDURE — 97162 PT EVAL MOD COMPLEX 30 MIN: CPT

## 2019-04-03 PROCEDURE — 25010000002 THIAMINE PER 100 MG: Performed by: HOSPITALIST

## 2019-04-03 PROCEDURE — 99232 SBSQ HOSP IP/OBS MODERATE 35: CPT | Performed by: NURSE PRACTITIONER

## 2019-04-03 PROCEDURE — 93010 ELECTROCARDIOGRAM REPORT: CPT | Performed by: INTERNAL MEDICINE

## 2019-04-03 PROCEDURE — 85730 THROMBOPLASTIN TIME PARTIAL: CPT | Performed by: PSYCHIATRY & NEUROLOGY

## 2019-04-03 PROCEDURE — 93005 ELECTROCARDIOGRAM TRACING: CPT | Performed by: HOSPITALIST

## 2019-04-03 PROCEDURE — 97112 NEUROMUSCULAR REEDUCATION: CPT | Performed by: OCCUPATIONAL THERAPIST

## 2019-04-03 RX ORDER — THIAMINE MONONITRATE (VIT B1) 100 MG
100 TABLET ORAL DAILY
Status: DISCONTINUED | OUTPATIENT
Start: 2019-04-04 | End: 2019-04-04 | Stop reason: HOSPADM

## 2019-04-03 RX ORDER — METOPROLOL TARTRATE 50 MG/1
50 TABLET, FILM COATED ORAL EVERY 12 HOURS SCHEDULED
Status: DISCONTINUED | OUTPATIENT
Start: 2019-04-03 | End: 2019-04-04 | Stop reason: HOSPADM

## 2019-04-03 RX ADMIN — METOPROLOL TARTRATE 25 MG: 25 TABLET ORAL at 06:25

## 2019-04-03 RX ADMIN — ASPIRIN 81 MG: 81 TABLET, CHEWABLE ORAL at 08:31

## 2019-04-03 RX ADMIN — SODIUM CHLORIDE, PRESERVATIVE FREE 3 ML: 5 INJECTION INTRAVENOUS at 22:31

## 2019-04-03 RX ADMIN — LEVOTHYROXINE SODIUM 50 MCG: 50 TABLET ORAL at 08:31

## 2019-04-03 RX ADMIN — INSULIN LISPRO 2 UNITS: 100 INJECTION, SOLUTION INTRAVENOUS; SUBCUTANEOUS at 17:58

## 2019-04-03 RX ADMIN — SODIUM CHLORIDE, PRESERVATIVE FREE 3 ML: 5 INJECTION INTRAVENOUS at 08:32

## 2019-04-03 RX ADMIN — SODIUM CHLORIDE 100 ML/HR: 9 INJECTION, SOLUTION INTRAVENOUS at 00:21

## 2019-04-03 RX ADMIN — METOPROLOL TARTRATE 50 MG: 50 TABLET, FILM COATED ORAL at 22:31

## 2019-04-03 RX ADMIN — PANTOPRAZOLE SODIUM 40 MG: 40 TABLET, DELAYED RELEASE ORAL at 08:31

## 2019-04-03 RX ADMIN — LABETALOL 20 MG/4 ML (5 MG/ML) INTRAVENOUS SYRINGE 20 MG: at 08:00

## 2019-04-03 RX ADMIN — ATORVASTATIN CALCIUM 80 MG: 80 TABLET, FILM COATED ORAL at 22:31

## 2019-04-03 RX ADMIN — APIXABAN 5 MG: 5 TABLET, FILM COATED ORAL at 22:32

## 2019-04-03 RX ADMIN — THIAMINE HYDROCHLORIDE 100 MG: 100 INJECTION, SOLUTION INTRAMUSCULAR; INTRAVENOUS at 17:57

## 2019-04-04 VITALS
OXYGEN SATURATION: 95 % | HEART RATE: 43 BPM | DIASTOLIC BLOOD PRESSURE: 70 MMHG | HEIGHT: 72 IN | SYSTOLIC BLOOD PRESSURE: 135 MMHG | TEMPERATURE: 98.2 F | RESPIRATION RATE: 18 BRPM | WEIGHT: 245.81 LBS | BODY MASS INDEX: 33.29 KG/M2

## 2019-04-04 LAB
ANION GAP SERPL CALCULATED.3IONS-SCNC: 13.3 MMOL/L
BASOPHILS # BLD AUTO: 0.05 10*3/MM3 (ref 0–0.2)
BASOPHILS NFR BLD AUTO: 0.4 % (ref 0–1.5)
BUN BLD-MCNC: 14 MG/DL (ref 8–23)
BUN/CREAT SERPL: 13.2 (ref 7–25)
CALCIUM SPEC-SCNC: 8.3 MG/DL (ref 8.6–10.5)
CHLORIDE SERPL-SCNC: 103 MMOL/L (ref 98–107)
CO2 SERPL-SCNC: 21.7 MMOL/L (ref 22–29)
CREAT BLD-MCNC: 1.06 MG/DL (ref 0.76–1.27)
DEPRECATED RDW RBC AUTO: 42.9 FL (ref 37–54)
EOSINOPHIL # BLD AUTO: 0.23 10*3/MM3 (ref 0–0.4)
EOSINOPHIL NFR BLD AUTO: 1.8 % (ref 0.3–6.2)
ERYTHROCYTE [DISTWIDTH] IN BLOOD BY AUTOMATED COUNT: 13 % (ref 12.3–15.4)
GFR SERPL CREATININE-BSD FRML MDRD: 69 ML/MIN/1.73
GLUCOSE BLD-MCNC: 155 MG/DL (ref 65–99)
GLUCOSE BLDC GLUCOMTR-MCNC: 128 MG/DL (ref 70–130)
GLUCOSE BLDC GLUCOMTR-MCNC: 136 MG/DL (ref 70–130)
HCT VFR BLD AUTO: 40.6 % (ref 37.5–51)
HGB BLD-MCNC: 13 G/DL (ref 13–17.7)
IMM GRANULOCYTES # BLD AUTO: 0.1 10*3/MM3 (ref 0–0.05)
IMM GRANULOCYTES NFR BLD AUTO: 0.8 % (ref 0–0.5)
LYMPHOCYTES # BLD AUTO: 2.06 10*3/MM3 (ref 0.7–3.1)
LYMPHOCYTES NFR BLD AUTO: 16.5 % (ref 19.6–45.3)
MCH RBC QN AUTO: 28.8 PG (ref 26.6–33)
MCHC RBC AUTO-ENTMCNC: 32 G/DL (ref 31.5–35.7)
MCV RBC AUTO: 90 FL (ref 79–97)
MONOCYTES # BLD AUTO: 1.1 10*3/MM3 (ref 0.1–0.9)
MONOCYTES NFR BLD AUTO: 8.8 % (ref 5–12)
NEUTROPHILS # BLD AUTO: 8.91 10*3/MM3 (ref 1.4–7)
NEUTROPHILS NFR BLD AUTO: 71.7 % (ref 42.7–76)
NRBC BLD AUTO-RTO: 0 /100 WBC (ref 0–0)
PLATELET # BLD AUTO: 275 10*3/MM3 (ref 140–450)
PMV BLD AUTO: 10.1 FL (ref 6–12)
POTASSIUM BLD-SCNC: 3.7 MMOL/L (ref 3.5–5.2)
RBC # BLD AUTO: 4.51 10*6/MM3 (ref 4.14–5.8)
RPR SER QL: NORMAL
SODIUM BLD-SCNC: 138 MMOL/L (ref 136–145)
WBC NRBC COR # BLD: 12.45 10*3/MM3 (ref 3.4–10.8)

## 2019-04-04 PROCEDURE — 85025 COMPLETE CBC W/AUTO DIFF WBC: CPT | Performed by: HOSPITALIST

## 2019-04-04 PROCEDURE — 86592 SYPHILIS TEST NON-TREP QUAL: CPT | Performed by: HOSPITALIST

## 2019-04-04 PROCEDURE — 82962 GLUCOSE BLOOD TEST: CPT

## 2019-04-04 PROCEDURE — 99232 SBSQ HOSP IP/OBS MODERATE 35: CPT | Performed by: NURSE PRACTITIONER

## 2019-04-04 PROCEDURE — 80048 BASIC METABOLIC PNL TOTAL CA: CPT | Performed by: NURSE PRACTITIONER

## 2019-04-04 PROCEDURE — 97110 THERAPEUTIC EXERCISES: CPT

## 2019-04-04 RX ORDER — WARFARIN SODIUM 5 MG/1
5 TABLET ORAL
Qty: 60 TABLET | Refills: 11 | Status: SHIPPED | OUTPATIENT
Start: 2019-04-04 | End: 2019-05-17

## 2019-04-04 RX ORDER — ATORVASTATIN CALCIUM 40 MG/1
40 TABLET, FILM COATED ORAL NIGHTLY
Qty: 30 TABLET | Refills: 0 | Status: SHIPPED | OUTPATIENT
Start: 2019-04-04 | End: 2019-04-29 | Stop reason: SDUPTHER

## 2019-04-04 RX ORDER — LANOLIN ALCOHOL/MO/W.PET/CERES
100 CREAM (GRAM) TOPICAL DAILY
Qty: 30 TABLET | Refills: 0 | Status: SHIPPED | OUTPATIENT
Start: 2019-04-04 | End: 2019-04-29 | Stop reason: SDUPTHER

## 2019-04-04 RX ORDER — ATORVASTATIN CALCIUM 20 MG/1
40 TABLET, FILM COATED ORAL NIGHTLY
Status: DISCONTINUED | OUTPATIENT
Start: 2019-04-04 | End: 2019-04-04 | Stop reason: HOSPADM

## 2019-04-04 RX ORDER — LISINOPRIL 20 MG/1
20 TABLET ORAL
Status: DISCONTINUED | OUTPATIENT
Start: 2019-04-04 | End: 2019-04-04 | Stop reason: HOSPADM

## 2019-04-04 RX ORDER — METOPROLOL SUCCINATE 100 MG/1
100 TABLET, EXTENDED RELEASE ORAL DAILY
Qty: 30 TABLET | Refills: 0 | Status: SHIPPED | OUTPATIENT
Start: 2019-04-04 | End: 2019-05-17

## 2019-04-04 RX ADMIN — PANTOPRAZOLE SODIUM 40 MG: 40 TABLET, DELAYED RELEASE ORAL at 08:32

## 2019-04-04 RX ADMIN — LEVOTHYROXINE SODIUM 50 MCG: 50 TABLET ORAL at 08:28

## 2019-04-04 RX ADMIN — APIXABAN 5 MG: 5 TABLET, FILM COATED ORAL at 08:28

## 2019-04-04 RX ADMIN — METOPROLOL TARTRATE 50 MG: 50 TABLET, FILM COATED ORAL at 08:28

## 2019-04-04 RX ADMIN — SODIUM CHLORIDE, PRESERVATIVE FREE 3 ML: 5 INJECTION INTRAVENOUS at 08:29

## 2019-04-04 RX ADMIN — Medication 100 MG: at 08:27

## 2019-04-05 ENCOUNTER — READMISSION MANAGEMENT (OUTPATIENT)
Dept: CALL CENTER | Facility: HOSPITAL | Age: 74
End: 2019-04-05

## 2019-04-05 ENCOUNTER — TELEPHONE (OUTPATIENT)
Dept: PHARMACY | Facility: HOSPITAL | Age: 74
End: 2019-04-05

## 2019-04-07 LAB
BACTERIA SPEC AEROBE CULT: NORMAL
BACTERIA SPEC AEROBE CULT: NORMAL

## 2019-04-09 ENCOUNTER — READMISSION MANAGEMENT (OUTPATIENT)
Dept: CALL CENTER | Facility: HOSPITAL | Age: 74
End: 2019-04-09

## 2019-04-09 RX ORDER — PANTOPRAZOLE SODIUM 40 MG/1
40 TABLET, DELAYED RELEASE ORAL DAILY
Qty: 30 TABLET | Refills: 0 | Status: SHIPPED | OUTPATIENT
Start: 2019-04-09 | End: 2019-05-29 | Stop reason: SDUPTHER

## 2019-04-11 ENCOUNTER — TELEPHONE (OUTPATIENT)
Dept: CARDIOLOGY | Facility: CLINIC | Age: 74
End: 2019-04-11

## 2019-04-16 ENCOUNTER — OFFICE VISIT (OUTPATIENT)
Dept: INTERNAL MEDICINE | Facility: CLINIC | Age: 74
End: 2019-04-16

## 2019-04-16 VITALS
TEMPERATURE: 97.9 F | HEART RATE: 96 BPM | HEIGHT: 72 IN | BODY MASS INDEX: 31.99 KG/M2 | SYSTOLIC BLOOD PRESSURE: 128 MMHG | DIASTOLIC BLOOD PRESSURE: 60 MMHG | OXYGEN SATURATION: 96 % | WEIGHT: 236.2 LBS

## 2019-04-16 DIAGNOSIS — E11.40 CONTROLLED TYPE 2 DIABETES MELLITUS WITH DIABETIC NEUROPATHY, WITHOUT LONG-TERM CURRENT USE OF INSULIN (HCC): ICD-10-CM

## 2019-04-16 DIAGNOSIS — I10 ESSENTIAL HYPERTENSION: Primary | ICD-10-CM

## 2019-04-16 DIAGNOSIS — I48.0 PAROXYSMAL ATRIAL FIBRILLATION (HCC): ICD-10-CM

## 2019-04-16 DIAGNOSIS — I63.9 CEREBROVASCULAR ACCIDENT (CVA), UNSPECIFIED MECHANISM (HCC): ICD-10-CM

## 2019-04-16 PROBLEM — R13.19 ESOPHAGEAL DYSPHAGIA: Status: RESOLVED | Noted: 2018-03-30 | Resolved: 2019-04-16

## 2019-04-16 PROBLEM — R26.0 ATAXIC GAIT: Status: RESOLVED | Noted: 2019-04-02 | Resolved: 2019-04-16

## 2019-04-16 PROBLEM — R79.89 ELEVATED D-DIMER: Status: RESOLVED | Noted: 2019-04-01 | Resolved: 2019-04-16

## 2019-04-16 PROBLEM — G93.41 ENCEPHALOPATHY, METABOLIC: Status: RESOLVED | Noted: 2019-04-01 | Resolved: 2019-04-16

## 2019-04-16 PROBLEM — R47.02 DYSPHASIA: Status: RESOLVED | Noted: 2019-04-01 | Resolved: 2019-04-16

## 2019-04-16 PROBLEM — R13.19 OTHER DYSPHAGIA: Status: RESOLVED | Noted: 2018-03-16 | Resolved: 2019-04-16

## 2019-04-16 PROCEDURE — 99214 OFFICE O/P EST MOD 30 MIN: CPT | Performed by: INTERNAL MEDICINE

## 2019-04-17 ENCOUNTER — TELEPHONE (OUTPATIENT)
Dept: NEUROLOGY | Facility: CLINIC | Age: 74
End: 2019-04-17

## 2019-04-18 ENCOUNTER — APPOINTMENT (OUTPATIENT)
Dept: GENERAL RADIOLOGY | Facility: HOSPITAL | Age: 74
End: 2019-04-18

## 2019-04-18 ENCOUNTER — READMISSION MANAGEMENT (OUTPATIENT)
Dept: CALL CENTER | Facility: HOSPITAL | Age: 74
End: 2019-04-18

## 2019-04-18 ENCOUNTER — TELEPHONE (OUTPATIENT)
Dept: INTERNAL MEDICINE | Facility: CLINIC | Age: 74
End: 2019-04-18

## 2019-04-18 ENCOUNTER — HOSPITAL ENCOUNTER (EMERGENCY)
Facility: HOSPITAL | Age: 74
Discharge: HOME | End: 2019-04-19
Attending: EMERGENCY MEDICINE | Admitting: EMERGENCY MEDICINE

## 2019-04-18 DIAGNOSIS — R41.0 TRANSIENT CONFUSION: Primary | ICD-10-CM

## 2019-04-18 DIAGNOSIS — D72.829 LEUKOCYTOSIS, UNSPECIFIED TYPE: ICD-10-CM

## 2019-04-18 DIAGNOSIS — R93.89 ABNORMAL CHEST X-RAY: ICD-10-CM

## 2019-04-18 PROCEDURE — 93005 ELECTROCARDIOGRAM TRACING: CPT | Performed by: NURSE PRACTITIONER

## 2019-04-18 PROCEDURE — 93010 ELECTROCARDIOGRAM REPORT: CPT | Performed by: INTERNAL MEDICINE

## 2019-04-18 PROCEDURE — 71046 X-RAY EXAM CHEST 2 VIEWS: CPT

## 2019-04-18 PROCEDURE — 99285 EMERGENCY DEPT VISIT HI MDM: CPT

## 2019-04-18 RX ORDER — SODIUM CHLORIDE 0.9 % (FLUSH) 0.9 %
10 SYRINGE (ML) INJECTION AS NEEDED
Status: DISCONTINUED | OUTPATIENT
Start: 2019-04-18 | End: 2019-04-19 | Stop reason: HOSPADM

## 2019-04-18 RX ORDER — CLONAZEPAM 1 MG/1
TABLET ORAL
Qty: 2 TABLET | Refills: 0 | Status: SHIPPED | OUTPATIENT
Start: 2019-04-18 | End: 2019-05-17 | Stop reason: ALTCHOICE

## 2019-04-19 ENCOUNTER — APPOINTMENT (OUTPATIENT)
Dept: CT IMAGING | Facility: HOSPITAL | Age: 74
End: 2019-04-19

## 2019-04-19 VITALS
OXYGEN SATURATION: 92 % | DIASTOLIC BLOOD PRESSURE: 63 MMHG | RESPIRATION RATE: 16 BRPM | TEMPERATURE: 99.8 F | HEART RATE: 68 BPM | BODY MASS INDEX: 32.78 KG/M2 | HEIGHT: 72 IN | WEIGHT: 242 LBS | SYSTOLIC BLOOD PRESSURE: 114 MMHG

## 2019-04-19 LAB
ALBUMIN SERPL-MCNC: 3.9 G/DL (ref 3.5–5.2)
ALBUMIN/GLOB SERPL: 1 G/DL
ALP SERPL-CCNC: 59 U/L (ref 39–117)
ALT SERPL W P-5'-P-CCNC: 15 U/L (ref 1–41)
ANION GAP SERPL CALCULATED.3IONS-SCNC: 16.3 MMOL/L
AST SERPL-CCNC: 11 U/L (ref 1–40)
B PARAPERT DNA SPEC QL NAA+PROBE: NOT DETECTED
B PERT DNA SPEC QL NAA+PROBE: NOT DETECTED
BACTERIA UR QL AUTO: ABNORMAL /HPF
BASOPHILS # BLD AUTO: 0.06 10*3/MM3 (ref 0–0.2)
BASOPHILS NFR BLD AUTO: 0.4 % (ref 0–1.5)
BILIRUB SERPL-MCNC: 1.2 MG/DL (ref 0.2–1.2)
BILIRUB UR QL STRIP: NEGATIVE
BUN BLD-MCNC: 18 MG/DL (ref 8–23)
BUN/CREAT SERPL: 14.5 (ref 7–25)
C PNEUM DNA NPH QL NAA+NON-PROBE: NOT DETECTED
CALCIUM SPEC-SCNC: 9.3 MG/DL (ref 8.6–10.5)
CHLORIDE SERPL-SCNC: 95 MMOL/L (ref 98–107)
CLARITY UR: ABNORMAL
CO2 SERPL-SCNC: 24.7 MMOL/L (ref 22–29)
COLOR UR: ABNORMAL
CREAT BLD-MCNC: 1.24 MG/DL (ref 0.76–1.27)
D-LACTATE SERPL-SCNC: 1.2 MMOL/L (ref 0.5–2)
DEPRECATED RDW RBC AUTO: 41 FL (ref 37–54)
EOSINOPHIL # BLD AUTO: 0.09 10*3/MM3 (ref 0–0.4)
EOSINOPHIL NFR BLD AUTO: 0.6 % (ref 0.3–6.2)
ERYTHROCYTE [DISTWIDTH] IN BLOOD BY AUTOMATED COUNT: 13 % (ref 12.3–15.4)
FLUAV H1 2009 PAND RNA NPH QL NAA+PROBE: NOT DETECTED
FLUAV H1 HA GENE NPH QL NAA+PROBE: NOT DETECTED
FLUAV H3 RNA NPH QL NAA+PROBE: NOT DETECTED
FLUAV SUBTYP SPEC NAA+PROBE: NOT DETECTED
FLUBV RNA ISLT QL NAA+PROBE: NOT DETECTED
GFR SERPL CREATININE-BSD FRML MDRD: 57 ML/MIN/1.73
GLOBULIN UR ELPH-MCNC: 3.8 GM/DL
GLUCOSE BLD-MCNC: 171 MG/DL (ref 65–99)
GLUCOSE UR STRIP-MCNC: NEGATIVE MG/DL
HADV DNA SPEC NAA+PROBE: NOT DETECTED
HCOV 229E RNA SPEC QL NAA+PROBE: NOT DETECTED
HCOV HKU1 RNA SPEC QL NAA+PROBE: NOT DETECTED
HCOV NL63 RNA SPEC QL NAA+PROBE: NOT DETECTED
HCOV OC43 RNA SPEC QL NAA+PROBE: NOT DETECTED
HCT VFR BLD AUTO: 41.5 % (ref 37.5–51)
HGB BLD-MCNC: 13.4 G/DL (ref 13–17.7)
HGB UR QL STRIP.AUTO: NEGATIVE
HMPV RNA NPH QL NAA+NON-PROBE: NOT DETECTED
HOLD SPECIMEN: NORMAL
HOLD SPECIMEN: NORMAL
HPIV1 RNA SPEC QL NAA+PROBE: NOT DETECTED
HPIV2 RNA SPEC QL NAA+PROBE: NOT DETECTED
HPIV3 RNA NPH QL NAA+PROBE: NOT DETECTED
HPIV4 P GENE NPH QL NAA+PROBE: NOT DETECTED
HYALINE CASTS UR QL AUTO: ABNORMAL /LPF
IMM GRANULOCYTES # BLD AUTO: 0.08 10*3/MM3 (ref 0–0.05)
IMM GRANULOCYTES NFR BLD AUTO: 0.5 % (ref 0–0.5)
INR PPP: 1.78 (ref 0.9–1.1)
KETONES UR QL STRIP: ABNORMAL
LEUKOCYTE ESTERASE UR QL STRIP.AUTO: ABNORMAL
LYMPHOCYTES # BLD AUTO: 2.84 10*3/MM3 (ref 0.7–3.1)
LYMPHOCYTES NFR BLD AUTO: 17.5 % (ref 19.6–45.3)
M PNEUMO IGG SER IA-ACNC: NOT DETECTED
MAGNESIUM SERPL-MCNC: 1.7 MG/DL (ref 1.6–2.4)
MCH RBC QN AUTO: 28 PG (ref 26.6–33)
MCHC RBC AUTO-ENTMCNC: 32.3 G/DL (ref 31.5–35.7)
MCV RBC AUTO: 86.6 FL (ref 79–97)
MONOCYTES # BLD AUTO: 1.63 10*3/MM3 (ref 0.1–0.9)
MONOCYTES NFR BLD AUTO: 10 % (ref 5–12)
NEUTROPHILS # BLD AUTO: 11.52 10*3/MM3 (ref 1.4–7)
NEUTROPHILS NFR BLD AUTO: 71 % (ref 42.7–76)
NITRITE UR QL STRIP: POSITIVE
NRBC BLD AUTO-RTO: 0 /100 WBC (ref 0–0)
NT-PROBNP SERPL-MCNC: 223.9 PG/ML (ref 5–900)
PH UR STRIP.AUTO: <=5 [PH] (ref 5–8)
PLATELET # BLD AUTO: 392 10*3/MM3 (ref 140–450)
PMV BLD AUTO: 9.5 FL (ref 6–12)
POTASSIUM BLD-SCNC: 3.7 MMOL/L (ref 3.5–5.2)
PROCALCITONIN SERPL-MCNC: 0.09 NG/ML (ref 0.1–0.25)
PROT SERPL-MCNC: 7.7 G/DL (ref 6–8.5)
PROT UR QL STRIP: ABNORMAL
PROTHROMBIN TIME: 20.4 SECONDS (ref 11.7–14.2)
RBC # BLD AUTO: 4.79 10*6/MM3 (ref 4.14–5.8)
RBC # UR: ABNORMAL /HPF
REF LAB TEST METHOD: ABNORMAL
RHINOVIRUS RNA SPEC NAA+PROBE: NOT DETECTED
RSV RNA NPH QL NAA+NON-PROBE: NOT DETECTED
SODIUM BLD-SCNC: 136 MMOL/L (ref 136–145)
SP GR UR STRIP: 1.03 (ref 1–1.03)
SQUAMOUS #/AREA URNS HPF: ABNORMAL /HPF
TROPONIN T SERPL-MCNC: <0.01 NG/ML (ref 0–0.03)
UROBILINOGEN UR QL STRIP: ABNORMAL
WBC NRBC COR # BLD: 16.22 10*3/MM3 (ref 3.4–10.8)
WBC UR QL AUTO: ABNORMAL /HPF
WHOLE BLOOD HOLD SPECIMEN: NORMAL
WHOLE BLOOD HOLD SPECIMEN: NORMAL

## 2019-04-19 PROCEDURE — 80053 COMPREHEN METABOLIC PANEL: CPT | Performed by: NURSE PRACTITIONER

## 2019-04-19 PROCEDURE — 87798 DETECT AGENT NOS DNA AMP: CPT | Performed by: PHYSICIAN ASSISTANT

## 2019-04-19 PROCEDURE — 81001 URINALYSIS AUTO W/SCOPE: CPT | Performed by: PHYSICIAN ASSISTANT

## 2019-04-19 PROCEDURE — 87486 CHLMYD PNEUM DNA AMP PROBE: CPT | Performed by: PHYSICIAN ASSISTANT

## 2019-04-19 PROCEDURE — 84145 PROCALCITONIN (PCT): CPT | Performed by: PHYSICIAN ASSISTANT

## 2019-04-19 PROCEDURE — 84484 ASSAY OF TROPONIN QUANT: CPT | Performed by: NURSE PRACTITIONER

## 2019-04-19 PROCEDURE — 87040 BLOOD CULTURE FOR BACTERIA: CPT | Performed by: PHYSICIAN ASSISTANT

## 2019-04-19 PROCEDURE — 70450 CT HEAD/BRAIN W/O DYE: CPT

## 2019-04-19 PROCEDURE — 83880 ASSAY OF NATRIURETIC PEPTIDE: CPT | Performed by: NURSE PRACTITIONER

## 2019-04-19 PROCEDURE — 83735 ASSAY OF MAGNESIUM: CPT | Performed by: PHYSICIAN ASSISTANT

## 2019-04-19 PROCEDURE — 85610 PROTHROMBIN TIME: CPT | Performed by: NURSE PRACTITIONER

## 2019-04-19 PROCEDURE — 83605 ASSAY OF LACTIC ACID: CPT | Performed by: PHYSICIAN ASSISTANT

## 2019-04-19 PROCEDURE — 85025 COMPLETE CBC W/AUTO DIFF WBC: CPT | Performed by: NURSE PRACTITIONER

## 2019-04-19 PROCEDURE — 87581 M.PNEUMON DNA AMP PROBE: CPT | Performed by: PHYSICIAN ASSISTANT

## 2019-04-19 PROCEDURE — 87633 RESP VIRUS 12-25 TARGETS: CPT | Performed by: PHYSICIAN ASSISTANT

## 2019-04-22 ENCOUNTER — OFFICE VISIT (OUTPATIENT)
Dept: INTERNAL MEDICINE | Facility: CLINIC | Age: 74
End: 2019-04-22

## 2019-04-22 VITALS
OXYGEN SATURATION: 98 % | BODY MASS INDEX: 31.4 KG/M2 | WEIGHT: 231.8 LBS | DIASTOLIC BLOOD PRESSURE: 60 MMHG | HEART RATE: 74 BPM | HEIGHT: 72 IN | TEMPERATURE: 97.6 F | SYSTOLIC BLOOD PRESSURE: 110 MMHG

## 2019-04-22 DIAGNOSIS — D72.829 LEUKOCYTOSIS, UNSPECIFIED TYPE: Primary | ICD-10-CM

## 2019-04-22 DIAGNOSIS — R31.9 HEMATURIA, UNSPECIFIED TYPE: ICD-10-CM

## 2019-04-22 DIAGNOSIS — J18.1 CONSOLIDATION LUNG (HCC): ICD-10-CM

## 2019-04-22 DIAGNOSIS — R41.0 CONFUSION: ICD-10-CM

## 2019-04-22 LAB
BILIRUB BLD-MCNC: NEGATIVE MG/DL
CLARITY, POC: CLEAR
COLOR UR: YELLOW
GLUCOSE UR STRIP-MCNC: NEGATIVE MG/DL
KETONES UR QL: NEGATIVE
LEUKOCYTE EST, POC: NEGATIVE
NITRITE UR-MCNC: NEGATIVE MG/ML
PH UR: 6 [PH] (ref 5–8)
PROT UR STRIP-MCNC: ABNORMAL MG/DL
RBC # UR STRIP: ABNORMAL /UL
SP GR UR: 1.02 (ref 1–1.03)
UROBILINOGEN UR QL: NORMAL

## 2019-04-22 PROCEDURE — 99214 OFFICE O/P EST MOD 30 MIN: CPT | Performed by: INTERNAL MEDICINE

## 2019-04-22 PROCEDURE — 81003 URINALYSIS AUTO W/O SCOPE: CPT | Performed by: INTERNAL MEDICINE

## 2019-04-24 ENCOUNTER — TELEPHONE (OUTPATIENT)
Dept: PHARMACY | Facility: HOSPITAL | Age: 74
End: 2019-04-24

## 2019-04-24 LAB
BACTERIA SPEC AEROBE CULT: NORMAL
BACTERIA SPEC AEROBE CULT: NORMAL
BACTERIA UR CULT: NO GROWTH
BACTERIA UR CULT: NORMAL

## 2019-04-25 ENCOUNTER — OFFICE VISIT (OUTPATIENT)
Dept: CARDIOLOGY | Facility: CLINIC | Age: 74
End: 2019-04-25

## 2019-04-25 ENCOUNTER — HOSPITAL ENCOUNTER (OUTPATIENT)
Dept: CT IMAGING | Facility: HOSPITAL | Age: 74
Discharge: HOME | End: 2019-04-25
Admitting: NURSE PRACTITIONER

## 2019-04-25 VITALS
SYSTOLIC BLOOD PRESSURE: 116 MMHG | DIASTOLIC BLOOD PRESSURE: 66 MMHG | HEIGHT: 72 IN | HEART RATE: 75 BPM | WEIGHT: 232 LBS | BODY MASS INDEX: 31.42 KG/M2 | OXYGEN SATURATION: 98 %

## 2019-04-25 DIAGNOSIS — R93.89 ABNORMAL CXR: ICD-10-CM

## 2019-04-25 DIAGNOSIS — R06.89 DECREASED BREATH SOUNDS AT LEFT LUNG BASE: ICD-10-CM

## 2019-04-25 DIAGNOSIS — I48.0 PAROXYSMAL ATRIAL FIBRILLATION (HCC): Primary | ICD-10-CM

## 2019-04-25 DIAGNOSIS — R41.0 CONFUSION: ICD-10-CM

## 2019-04-25 PROCEDURE — 71250 CT THORAX DX C-: CPT

## 2019-04-25 PROCEDURE — 99215 OFFICE O/P EST HI 40 MIN: CPT | Performed by: NURSE PRACTITIONER

## 2019-04-26 ENCOUNTER — READMISSION MANAGEMENT (OUTPATIENT)
Dept: CALL CENTER | Facility: HOSPITAL | Age: 74
End: 2019-04-26

## 2019-04-26 ENCOUNTER — HOSPITAL ENCOUNTER (OUTPATIENT)
Dept: MRI IMAGING | Facility: HOSPITAL | Age: 74
Discharge: HOME | End: 2019-04-26
Admitting: INTERNAL MEDICINE

## 2019-04-26 ENCOUNTER — HOSPITAL ENCOUNTER (OUTPATIENT)
Dept: CARDIOLOGY | Facility: HOSPITAL | Age: 74
Discharge: HOME | End: 2019-04-26

## 2019-04-26 VITALS
WEIGHT: 232 LBS | SYSTOLIC BLOOD PRESSURE: 100 MMHG | BODY MASS INDEX: 31.42 KG/M2 | DIASTOLIC BLOOD PRESSURE: 60 MMHG | HEART RATE: 60 BPM | HEIGHT: 72 IN

## 2019-04-26 DIAGNOSIS — I31.39 PERICARDIAL EFFUSION: Primary | ICD-10-CM

## 2019-04-26 DIAGNOSIS — I48.0 PAROXYSMAL ATRIAL FIBRILLATION (HCC): ICD-10-CM

## 2019-04-26 DIAGNOSIS — I31.39 PERICARDIAL EFFUSION: ICD-10-CM

## 2019-04-26 LAB — CREAT BLDA-MCNC: 1.2 MG/DL (ref 0.6–1.3)

## 2019-04-26 PROCEDURE — 93308 TTE F-UP OR LMTD: CPT

## 2019-04-26 PROCEDURE — 82565 ASSAY OF CREATININE: CPT

## 2019-04-26 PROCEDURE — 93321 DOPPLER ECHO F-UP/LMTD STD: CPT | Performed by: INTERNAL MEDICINE

## 2019-04-26 PROCEDURE — 93308 TTE F-UP OR LMTD: CPT | Performed by: INTERNAL MEDICINE

## 2019-04-26 PROCEDURE — 70551 MRI BRAIN STEM W/O DYE: CPT

## 2019-04-26 PROCEDURE — 93321 DOPPLER ECHO F-UP/LMTD STD: CPT

## 2019-04-26 RX ORDER — LISINOPRIL AND HYDROCHLOROTHIAZIDE 20; 12.5 MG/1; MG/1
TABLET ORAL
Qty: 90 TABLET | Refills: 0 | Status: SHIPPED | OUTPATIENT
Start: 2019-04-26 | End: 2019-07-24 | Stop reason: SDUPTHER

## 2019-04-29 DIAGNOSIS — J90 PLEURAL EFFUSION ON LEFT: Primary | ICD-10-CM

## 2019-04-29 RX ORDER — ATORVASTATIN CALCIUM 40 MG/1
40 TABLET, FILM COATED ORAL NIGHTLY
Qty: 90 TABLET | Refills: 1 | Status: SHIPPED | OUTPATIENT
Start: 2019-04-29 | End: 2019-10-29 | Stop reason: SDUPTHER

## 2019-04-29 RX ORDER — FUROSEMIDE 40 MG/1
40 TABLET ORAL DAILY
Qty: 30 TABLET | Refills: 0 | Status: SHIPPED | OUTPATIENT
Start: 2019-04-29 | End: 2019-09-04

## 2019-04-29 RX ORDER — LANOLIN ALCOHOL/MO/W.PET/CERES
100 CREAM (GRAM) TOPICAL DAILY
Qty: 90 TABLET | Refills: 1 | Status: SHIPPED | OUTPATIENT
Start: 2019-04-29 | End: 2019-10-29 | Stop reason: SDUPTHER

## 2019-05-01 ENCOUNTER — APPOINTMENT (OUTPATIENT)
Dept: CT IMAGING | Facility: HOSPITAL | Age: 74
End: 2019-05-01

## 2019-05-01 ENCOUNTER — TELEPHONE (OUTPATIENT)
Dept: PHARMACY | Facility: HOSPITAL | Age: 74
End: 2019-05-01

## 2019-05-06 LAB
BH CV ECHO MEAS - ACS: 2.1 CM
BH CV ECHO MEAS - AO ROOT AREA (BSA CORRECTED): 1.5
BH CV ECHO MEAS - AO ROOT AREA: 8.6 CM^2
BH CV ECHO MEAS - AO ROOT DIAM: 3.3 CM
BH CV ECHO MEAS - BSA(HAYCOCK): 2.3 M^2
BH CV ECHO MEAS - BSA: 2.3 M^2
BH CV ECHO MEAS - BZI_BMI: 31.5 KILOGRAMS/M^2
BH CV ECHO MEAS - BZI_METRIC_HEIGHT: 182.9 CM
BH CV ECHO MEAS - BZI_METRIC_WEIGHT: 105.2 KG
BH CV ECHO MEAS - EDV(CUBED): 191.1 ML
BH CV ECHO MEAS - EDV(TEICH): 163.9 ML
BH CV ECHO MEAS - EF(CUBED): 48.7 %
BH CV ECHO MEAS - EF(TEICH): 40.3 %
BH CV ECHO MEAS - ESV(CUBED): 98 ML
BH CV ECHO MEAS - ESV(TEICH): 97.8 ML
BH CV ECHO MEAS - FS: 20 %
BH CV ECHO MEAS - IVS/LVPW: 1.2
BH CV ECHO MEAS - IVSD: 1.7 CM
BH CV ECHO MEAS - LV MASS(C)D: 434.1 GRAMS
BH CV ECHO MEAS - LV MASS(C)DI: 191.3 GRAMS/M^2
BH CV ECHO MEAS - LVIDD: 5.8 CM
BH CV ECHO MEAS - LVIDS: 4.6 CM
BH CV ECHO MEAS - LVPWD: 1.4 CM
BH CV ECHO MEAS - SI(CUBED): 41 ML/M^2
BH CV ECHO MEAS - SI(TEICH): 29.1 ML/M^2
BH CV ECHO MEAS - SV(CUBED): 93.1 ML
BH CV ECHO MEAS - SV(TEICH): 66.1 ML
MAXIMAL PREDICTED HEART RATE: 148 BPM
STRESS TARGET HR: 126 BPM

## 2019-05-09 RX ORDER — PANTOPRAZOLE SODIUM 40 MG/1
TABLET, DELAYED RELEASE ORAL
Qty: 30 TABLET | Refills: 0 | OUTPATIENT
Start: 2019-05-09

## 2019-05-13 ENCOUNTER — TELEPHONE (OUTPATIENT)
Dept: INTERNAL MEDICINE | Facility: CLINIC | Age: 74
End: 2019-05-13

## 2019-05-17 ENCOUNTER — OFFICE VISIT (OUTPATIENT)
Dept: CARDIOLOGY | Facility: CLINIC | Age: 74
End: 2019-05-17

## 2019-05-17 VITALS
SYSTOLIC BLOOD PRESSURE: 120 MMHG | BODY MASS INDEX: 30.88 KG/M2 | OXYGEN SATURATION: 99 % | HEART RATE: 56 BPM | DIASTOLIC BLOOD PRESSURE: 60 MMHG | WEIGHT: 228 LBS | HEIGHT: 72 IN

## 2019-05-17 DIAGNOSIS — I48.0 PAROXYSMAL ATRIAL FIBRILLATION (HCC): Primary | ICD-10-CM

## 2019-05-17 DIAGNOSIS — I10 ESSENTIAL HYPERTENSION: ICD-10-CM

## 2019-05-17 PROCEDURE — 99214 OFFICE O/P EST MOD 30 MIN: CPT | Performed by: INTERNAL MEDICINE

## 2019-05-17 RX ORDER — METOPROLOL SUCCINATE 25 MG/1
25 TABLET, EXTENDED RELEASE ORAL DAILY
Qty: 30 TABLET | Refills: 11 | Status: SHIPPED | OUTPATIENT
Start: 2019-05-17 | End: 2019-11-19

## 2019-05-17 RX ORDER — LANOLIN ALCOHOL/MO/W.PET/CERES
1000 CREAM (GRAM) TOPICAL DAILY
COMMUNITY
End: 2022-05-19

## 2019-05-23 ENCOUNTER — LAB (OUTPATIENT)
Dept: LAB | Facility: HOSPITAL | Age: 74
End: 2019-05-23

## 2019-05-23 ENCOUNTER — APPOINTMENT (OUTPATIENT)
Dept: OTHER | Facility: HOSPITAL | Age: 74
End: 2019-05-23

## 2019-05-23 ENCOUNTER — CONSULT (OUTPATIENT)
Dept: ONCOLOGY | Facility: CLINIC | Age: 74
End: 2019-05-23

## 2019-05-23 ENCOUNTER — APPOINTMENT (OUTPATIENT)
Dept: ONCOLOGY | Facility: CLINIC | Age: 74
End: 2019-05-23

## 2019-05-23 VITALS
RESPIRATION RATE: 12 BRPM | HEART RATE: 56 BPM | TEMPERATURE: 97.6 F | HEIGHT: 72 IN | OXYGEN SATURATION: 96 % | BODY MASS INDEX: 31.1 KG/M2 | DIASTOLIC BLOOD PRESSURE: 62 MMHG | SYSTOLIC BLOOD PRESSURE: 108 MMHG | WEIGHT: 229.6 LBS

## 2019-05-23 DIAGNOSIS — D72.829 LEUKOCYTOSIS, UNSPECIFIED TYPE: Primary | ICD-10-CM

## 2019-05-23 LAB
BASOPHILS # BLD AUTO: 0.05 10*3/MM3 (ref 0–0.2)
BASOPHILS NFR BLD AUTO: 0.5 % (ref 0–1.5)
DEPRECATED RDW RBC AUTO: 38.9 FL (ref 37–54)
EOSINOPHIL # BLD AUTO: 0.36 10*3/MM3 (ref 0–0.4)
EOSINOPHIL NFR BLD AUTO: 3.7 % (ref 0.3–6.2)
ERYTHROCYTE [DISTWIDTH] IN BLOOD BY AUTOMATED COUNT: 13.2 % (ref 12.3–15.4)
HCT VFR BLD AUTO: 37.8 % (ref 37.5–51)
HGB BLD-MCNC: 12.9 G/DL (ref 13–17.7)
IMM GRANULOCYTES # BLD AUTO: 0.06 10*3/MM3 (ref 0–0.05)
IMM GRANULOCYTES NFR BLD AUTO: 0.6 % (ref 0–0.5)
LYMPHOCYTES # BLD AUTO: 2.88 10*3/MM3 (ref 0.7–3.1)
LYMPHOCYTES NFR BLD AUTO: 29.2 % (ref 19.6–45.3)
MCH RBC QN AUTO: 27.7 PG (ref 26.6–33)
MCHC RBC AUTO-ENTMCNC: 34.1 G/DL (ref 31.5–35.7)
MCV RBC AUTO: 81.3 FL (ref 79–97)
MONOCYTES # BLD AUTO: 0.85 10*3/MM3 (ref 0.1–0.9)
MONOCYTES NFR BLD AUTO: 8.6 % (ref 5–12)
NEUTROPHILS # BLD AUTO: 5.66 10*3/MM3 (ref 1.7–7)
NEUTROPHILS NFR BLD AUTO: 57.4 % (ref 42.7–76)
NRBC BLD AUTO-RTO: 0 /100 WBC (ref 0–0.2)
PLATELET # BLD AUTO: 192 10*3/MM3 (ref 140–450)
PMV BLD AUTO: 10.5 FL (ref 6–12)
RBC # BLD AUTO: 4.65 10*6/MM3 (ref 4.14–5.8)
WBC NRBC COR # BLD: 9.86 10*3/MM3 (ref 3.4–10.8)

## 2019-05-23 PROCEDURE — 85025 COMPLETE CBC W/AUTO DIFF WBC: CPT | Performed by: INTERNAL MEDICINE

## 2019-05-23 PROCEDURE — 99204 OFFICE O/P NEW MOD 45 MIN: CPT | Performed by: INTERNAL MEDICINE

## 2019-05-23 PROCEDURE — 36415 COLL VENOUS BLD VENIPUNCTURE: CPT | Performed by: INTERNAL MEDICINE

## 2019-05-24 ENCOUNTER — OFFICE VISIT (OUTPATIENT)
Dept: NEUROLOGY | Facility: CLINIC | Age: 74
End: 2019-05-24

## 2019-05-24 VITALS
OXYGEN SATURATION: 98 % | BODY MASS INDEX: 31.02 KG/M2 | DIASTOLIC BLOOD PRESSURE: 72 MMHG | WEIGHT: 229 LBS | HEART RATE: 62 BPM | HEIGHT: 72 IN | SYSTOLIC BLOOD PRESSURE: 130 MMHG

## 2019-05-24 DIAGNOSIS — Z86.73 HISTORY OF STROKE: ICD-10-CM

## 2019-05-24 DIAGNOSIS — E11.40 CONTROLLED TYPE 2 DIABETES MELLITUS WITH DIABETIC NEUROPATHY, WITHOUT LONG-TERM CURRENT USE OF INSULIN (HCC): ICD-10-CM

## 2019-05-24 DIAGNOSIS — I48.0 PAROXYSMAL ATRIAL FIBRILLATION (HCC): ICD-10-CM

## 2019-05-24 DIAGNOSIS — G45.9 TIA (TRANSIENT ISCHEMIC ATTACK): ICD-10-CM

## 2019-05-24 DIAGNOSIS — E78.2 MIXED HYPERLIPIDEMIA: ICD-10-CM

## 2019-05-24 DIAGNOSIS — I10 ESSENTIAL HYPERTENSION: ICD-10-CM

## 2019-05-24 DIAGNOSIS — R41.89 COGNITIVE IMPAIRMENT: ICD-10-CM

## 2019-05-24 PROCEDURE — 99215 OFFICE O/P EST HI 40 MIN: CPT | Performed by: NURSE PRACTITIONER

## 2019-05-29 RX ORDER — PANTOPRAZOLE SODIUM 40 MG/1
TABLET, DELAYED RELEASE ORAL
Qty: 30 TABLET | Refills: 0 | Status: SHIPPED | OUTPATIENT
Start: 2019-05-29 | End: 2019-06-27 | Stop reason: SDUPTHER

## 2019-06-03 PROBLEM — G45.9 TIA (TRANSIENT ISCHEMIC ATTACK): Status: ACTIVE | Noted: 2019-06-03

## 2019-06-03 PROBLEM — Z86.73 HISTORY OF STROKE: Status: ACTIVE | Noted: 2019-06-03

## 2019-06-06 ENCOUNTER — LAB (OUTPATIENT)
Dept: LAB | Facility: HOSPITAL | Age: 74
End: 2019-06-06

## 2019-06-06 DIAGNOSIS — J90 PLEURAL EFFUSION ON LEFT: ICD-10-CM

## 2019-06-06 LAB
ANION GAP SERPL CALCULATED.3IONS-SCNC: 13.6 MMOL/L
BUN BLD-MCNC: 8 MG/DL (ref 8–23)
BUN/CREAT SERPL: 8.6 (ref 7–25)
CALCIUM SPEC-SCNC: 8.4 MG/DL (ref 8.6–10.5)
CHLORIDE SERPL-SCNC: 98 MMOL/L (ref 98–107)
CO2 SERPL-SCNC: 30.4 MMOL/L (ref 22–29)
CREAT BLD-MCNC: 0.93 MG/DL (ref 0.76–1.27)
GFR SERPL CREATININE-BSD FRML MDRD: 80 ML/MIN/1.73
GLUCOSE BLD-MCNC: 116 MG/DL (ref 65–99)
POTASSIUM BLD-SCNC: 3.2 MMOL/L (ref 3.5–5.2)
SODIUM BLD-SCNC: 142 MMOL/L (ref 136–145)

## 2019-06-06 PROCEDURE — 36415 COLL VENOUS BLD VENIPUNCTURE: CPT

## 2019-06-06 PROCEDURE — 80048 BASIC METABOLIC PNL TOTAL CA: CPT

## 2019-06-06 RX ORDER — POTASSIUM CHLORIDE 750 MG/1
10 TABLET, FILM COATED, EXTENDED RELEASE ORAL DAILY
Qty: 30 TABLET | Refills: 11 | Status: SHIPPED | OUTPATIENT
Start: 2019-06-06 | End: 2019-09-04

## 2019-06-12 RX ORDER — LEVOTHYROXINE SODIUM 0.05 MG/1
TABLET ORAL
Qty: 90 TABLET | Refills: 1 | Status: SHIPPED | OUTPATIENT
Start: 2019-06-12 | End: 2019-12-04 | Stop reason: SDUPTHER

## 2019-06-13 ENCOUNTER — RESULTS ENCOUNTER (OUTPATIENT)
Dept: INTERNAL MEDICINE | Facility: CLINIC | Age: 74
End: 2019-06-13

## 2019-06-13 DIAGNOSIS — E11.40 CONTROLLED TYPE 2 DIABETES MELLITUS WITH DIABETIC NEUROPATHY, WITHOUT LONG-TERM CURRENT USE OF INSULIN (HCC): ICD-10-CM

## 2019-06-13 DIAGNOSIS — I10 ESSENTIAL HYPERTENSION: ICD-10-CM

## 2019-06-14 ENCOUNTER — TELEPHONE (OUTPATIENT)
Dept: NEUROLOGY | Facility: CLINIC | Age: 74
End: 2019-06-14

## 2019-06-21 LAB
ALBUMIN SERPL-MCNC: 4.2 G/DL (ref 3.5–5.2)
ALBUMIN/GLOB SERPL: 1.8 G/DL
ALP SERPL-CCNC: 80 U/L (ref 39–117)
ALT SERPL-CCNC: 10 U/L (ref 1–41)
AST SERPL-CCNC: 12 U/L (ref 1–40)
BASOPHILS # BLD AUTO: 0.04 10*3/MM3 (ref 0–0.2)
BASOPHILS NFR BLD AUTO: 0.4 % (ref 0–1.5)
BILIRUB SERPL-MCNC: 0.7 MG/DL (ref 0.2–1.2)
BUN SERPL-MCNC: 8 MG/DL (ref 8–23)
BUN/CREAT SERPL: 9 (ref 7–25)
CALCIUM SERPL-MCNC: 7.4 MG/DL (ref 8.6–10.5)
CHLORIDE SERPL-SCNC: 96 MMOL/L (ref 98–107)
CO2 SERPL-SCNC: 36.9 MMOL/L (ref 22–29)
CREAT SERPL-MCNC: 0.89 MG/DL (ref 0.76–1.27)
EOSINOPHIL # BLD AUTO: 0.3 10*3/MM3 (ref 0–0.4)
EOSINOPHIL NFR BLD AUTO: 3.3 % (ref 0.3–6.2)
ERYTHROCYTE [DISTWIDTH] IN BLOOD BY AUTOMATED COUNT: 13.7 % (ref 12.3–15.4)
GLOBULIN SER CALC-MCNC: 2.3 GM/DL
GLUCOSE SERPL-MCNC: 125 MG/DL (ref 65–99)
HBA1C MFR BLD: 6.5 % (ref 4.8–5.6)
HCT VFR BLD AUTO: 39.7 % (ref 37.5–51)
HGB BLD-MCNC: 12.4 G/DL (ref 13–17.7)
IMM GRANULOCYTES # BLD AUTO: 0.03 10*3/MM3 (ref 0–0.05)
IMM GRANULOCYTES NFR BLD AUTO: 0.3 % (ref 0–0.5)
LYMPHOCYTES # BLD AUTO: 2.32 10*3/MM3 (ref 0.7–3.1)
LYMPHOCYTES NFR BLD AUTO: 25.1 % (ref 19.6–45.3)
MCH RBC QN AUTO: 27.5 PG (ref 26.6–33)
MCHC RBC AUTO-ENTMCNC: 31.2 G/DL (ref 31.5–35.7)
MCV RBC AUTO: 88 FL (ref 79–97)
MONOCYTES # BLD AUTO: 0.66 10*3/MM3 (ref 0.1–0.9)
MONOCYTES NFR BLD AUTO: 7.2 % (ref 5–12)
NEUTROPHILS # BLD AUTO: 5.88 10*3/MM3 (ref 1.7–7)
NEUTROPHILS NFR BLD AUTO: 63.7 % (ref 42.7–76)
NRBC BLD AUTO-RTO: 0 /100 WBC (ref 0–0.2)
PLATELET # BLD AUTO: 248 10*3/MM3 (ref 140–450)
POTASSIUM SERPL-SCNC: 3.3 MMOL/L (ref 3.5–5.2)
PROT SERPL-MCNC: 6.5 G/DL (ref 6–8.5)
RBC # BLD AUTO: 4.51 10*6/MM3 (ref 4.14–5.8)
SODIUM SERPL-SCNC: 145 MMOL/L (ref 136–145)
WBC # BLD AUTO: 9.23 10*3/MM3 (ref 3.4–10.8)

## 2019-06-26 ENCOUNTER — ANTICOAGULATION VISIT (OUTPATIENT)
Dept: PHARMACY | Facility: HOSPITAL | Age: 74
End: 2019-06-26

## 2019-06-26 ENCOUNTER — APPOINTMENT (OUTPATIENT)
Dept: PHARMACY | Facility: HOSPITAL | Age: 74
End: 2019-06-26

## 2019-06-26 ENCOUNTER — OFFICE VISIT (OUTPATIENT)
Dept: CARDIOLOGY | Facility: CLINIC | Age: 74
End: 2019-06-26

## 2019-06-26 VITALS
WEIGHT: 230 LBS | BODY MASS INDEX: 32.2 KG/M2 | DIASTOLIC BLOOD PRESSURE: 60 MMHG | HEART RATE: 54 BPM | SYSTOLIC BLOOD PRESSURE: 170 MMHG | HEIGHT: 71 IN

## 2019-06-26 DIAGNOSIS — I48.0 PAROXYSMAL ATRIAL FIBRILLATION (HCC): ICD-10-CM

## 2019-06-26 DIAGNOSIS — I10 ESSENTIAL HYPERTENSION: ICD-10-CM

## 2019-06-26 DIAGNOSIS — I48.0 PAF (PAROXYSMAL ATRIAL FIBRILLATION) (HCC): Primary | ICD-10-CM

## 2019-06-26 PROCEDURE — 99214 OFFICE O/P EST MOD 30 MIN: CPT | Performed by: INTERNAL MEDICINE

## 2019-06-26 RX ORDER — PANTOPRAZOLE SODIUM 40 MG/1
TABLET, DELAYED RELEASE ORAL
Qty: 30 TABLET | Refills: 0 | Status: CANCELLED | OUTPATIENT
Start: 2019-06-26

## 2019-06-27 ENCOUNTER — OFFICE VISIT (OUTPATIENT)
Dept: GASTROENTEROLOGY | Facility: CLINIC | Age: 74
End: 2019-06-27

## 2019-06-27 VITALS
HEIGHT: 71 IN | DIASTOLIC BLOOD PRESSURE: 68 MMHG | WEIGHT: 228.8 LBS | TEMPERATURE: 98 F | BODY MASS INDEX: 32.03 KG/M2 | SYSTOLIC BLOOD PRESSURE: 142 MMHG

## 2019-06-27 DIAGNOSIS — Z83.71 FAMILY HISTORY OF POLYPS IN THE COLON: ICD-10-CM

## 2019-06-27 DIAGNOSIS — D12.2 ADENOMATOUS POLYP OF ASCENDING COLON: ICD-10-CM

## 2019-06-27 DIAGNOSIS — Z80.0 FAMILY HISTORY OF GI MALIGNANCY: ICD-10-CM

## 2019-06-27 DIAGNOSIS — R13.19 ESOPHAGEAL DYSPHAGIA: Primary | ICD-10-CM

## 2019-06-27 PROCEDURE — 99212 OFFICE O/P EST SF 10 MIN: CPT | Performed by: INTERNAL MEDICINE

## 2019-06-27 RX ORDER — PANTOPRAZOLE SODIUM 40 MG/1
40 TABLET, DELAYED RELEASE ORAL DAILY
Qty: 90 TABLET | Refills: 5 | Status: SHIPPED | OUTPATIENT
Start: 2019-06-27 | End: 2020-10-19 | Stop reason: SDUPTHER

## 2019-06-28 ENCOUNTER — TELEPHONE (OUTPATIENT)
Dept: CARDIOLOGY | Facility: CLINIC | Age: 74
End: 2019-06-28

## 2019-07-01 ENCOUNTER — TELEPHONE (OUTPATIENT)
Dept: CARDIOLOGY | Facility: CLINIC | Age: 74
End: 2019-07-01

## 2019-07-03 ENCOUNTER — OFFICE VISIT (OUTPATIENT)
Dept: INTERNAL MEDICINE | Facility: CLINIC | Age: 74
End: 2019-07-03

## 2019-07-03 VITALS
HEIGHT: 71 IN | HEART RATE: 55 BPM | BODY MASS INDEX: 31.92 KG/M2 | OXYGEN SATURATION: 98 % | TEMPERATURE: 98.1 F | WEIGHT: 228 LBS | DIASTOLIC BLOOD PRESSURE: 70 MMHG | SYSTOLIC BLOOD PRESSURE: 128 MMHG

## 2019-07-03 DIAGNOSIS — I10 ESSENTIAL HYPERTENSION: Primary | ICD-10-CM

## 2019-07-03 DIAGNOSIS — E78.2 MIXED HYPERLIPIDEMIA: ICD-10-CM

## 2019-07-03 DIAGNOSIS — I48.0 PAROXYSMAL ATRIAL FIBRILLATION (HCC): ICD-10-CM

## 2019-07-03 DIAGNOSIS — E11.40 CONTROLLED TYPE 2 DIABETES MELLITUS WITH DIABETIC NEUROPATHY, WITHOUT LONG-TERM CURRENT USE OF INSULIN (HCC): ICD-10-CM

## 2019-07-03 PROCEDURE — 99214 OFFICE O/P EST MOD 30 MIN: CPT | Performed by: INTERNAL MEDICINE

## 2019-07-18 ENCOUNTER — OFFICE VISIT (OUTPATIENT)
Dept: ORTHOPEDIC SURGERY | Facility: CLINIC | Age: 74
End: 2019-07-18

## 2019-07-18 VITALS — BODY MASS INDEX: 30.94 KG/M2 | HEIGHT: 71 IN | TEMPERATURE: 97.9 F | WEIGHT: 221 LBS

## 2019-07-18 DIAGNOSIS — M16.11 PRIMARY OSTEOARTHRITIS OF RIGHT HIP: Primary | ICD-10-CM

## 2019-07-18 DIAGNOSIS — Z96.642 STATUS POST LEFT HIP REPLACEMENT: ICD-10-CM

## 2019-07-18 PROCEDURE — 99213 OFFICE O/P EST LOW 20 MIN: CPT | Performed by: ORTHOPAEDIC SURGERY

## 2019-07-18 PROCEDURE — 73502 X-RAY EXAM HIP UNI 2-3 VIEWS: CPT | Performed by: ORTHOPAEDIC SURGERY

## 2019-07-24 RX ORDER — LISINOPRIL AND HYDROCHLOROTHIAZIDE 20; 12.5 MG/1; MG/1
TABLET ORAL
Qty: 90 TABLET | Refills: 1 | Status: SHIPPED | OUTPATIENT
Start: 2019-07-24 | End: 2020-01-31

## 2019-07-29 ENCOUNTER — ANTICOAGULATION VISIT (OUTPATIENT)
Dept: PHARMACY | Facility: HOSPITAL | Age: 74
End: 2019-07-29

## 2019-07-29 DIAGNOSIS — I48.0 PAROXYSMAL ATRIAL FIBRILLATION (HCC): ICD-10-CM

## 2019-07-29 LAB
INR PPP: 1.3 (ref 0.91–1.09)
PROTHROMBIN TIME: 15.8 SECONDS (ref 10–13.8)

## 2019-07-29 PROCEDURE — 36416 COLLJ CAPILLARY BLOOD SPEC: CPT

## 2019-07-29 PROCEDURE — 85610 PROTHROMBIN TIME: CPT

## 2019-07-29 PROCEDURE — G0463 HOSPITAL OUTPT CLINIC VISIT: HCPCS

## 2019-07-29 RX ORDER — TRAZODONE HYDROCHLORIDE 50 MG/1
TABLET ORAL
Qty: 30 TABLET | Refills: 5 | Status: SHIPPED | OUTPATIENT
Start: 2019-07-29 | End: 2020-01-31

## 2019-07-29 RX ORDER — LANCETS
EACH MISCELLANEOUS
Qty: 180 EACH | Refills: 3 | Status: SHIPPED | OUTPATIENT
Start: 2019-07-29 | End: 2019-09-16

## 2019-07-29 RX ORDER — BLOOD SUGAR DIAGNOSTIC, DRUM
STRIP MISCELLANEOUS
Qty: 180 EACH | Refills: 3 | Status: SHIPPED | OUTPATIENT
Start: 2019-07-29 | End: 2019-09-16

## 2019-08-01 ENCOUNTER — ANTICOAGULATION VISIT (OUTPATIENT)
Dept: PHARMACY | Facility: HOSPITAL | Age: 74
End: 2019-08-01

## 2019-08-01 DIAGNOSIS — I48.0 PAROXYSMAL ATRIAL FIBRILLATION (HCC): ICD-10-CM

## 2019-08-01 LAB
INR PPP: 2.5 (ref 0.91–1.09)
PROTHROMBIN TIME: 30.2 SECONDS (ref 10–13.8)

## 2019-08-01 PROCEDURE — G0463 HOSPITAL OUTPT CLINIC VISIT: HCPCS

## 2019-08-01 PROCEDURE — 85610 PROTHROMBIN TIME: CPT

## 2019-08-01 PROCEDURE — 36416 COLLJ CAPILLARY BLOOD SPEC: CPT

## 2019-08-03 ENCOUNTER — RESULTS ENCOUNTER (OUTPATIENT)
Dept: INTERNAL MEDICINE | Facility: CLINIC | Age: 74
End: 2019-08-03

## 2019-08-03 DIAGNOSIS — I10 ESSENTIAL HYPERTENSION: ICD-10-CM

## 2019-08-05 ENCOUNTER — ANTICOAGULATION VISIT (OUTPATIENT)
Dept: PHARMACY | Facility: HOSPITAL | Age: 74
End: 2019-08-05

## 2019-08-05 DIAGNOSIS — I48.0 PAROXYSMAL ATRIAL FIBRILLATION (HCC): ICD-10-CM

## 2019-08-05 LAB
INR PPP: 2.5 (ref 0.91–1.09)
PROTHROMBIN TIME: 29.7 SECONDS (ref 10–13.8)

## 2019-08-05 PROCEDURE — 36416 COLLJ CAPILLARY BLOOD SPEC: CPT

## 2019-08-05 PROCEDURE — 85610 PROTHROMBIN TIME: CPT

## 2019-08-05 PROCEDURE — G0463 HOSPITAL OUTPT CLINIC VISIT: HCPCS

## 2019-08-06 LAB
BUN SERPL-MCNC: 9 MG/DL (ref 8–23)
BUN/CREAT SERPL: 10 (ref 7–25)
CALCIUM SERPL-MCNC: 8.9 MG/DL (ref 8.6–10.5)
CHLORIDE SERPL-SCNC: 98 MMOL/L (ref 98–107)
CO2 SERPL-SCNC: 31.1 MMOL/L (ref 22–29)
CREAT SERPL-MCNC: 0.9 MG/DL (ref 0.76–1.27)
GLUCOSE SERPL-MCNC: 143 MG/DL (ref 65–99)
POTASSIUM SERPL-SCNC: 3.8 MMOL/L (ref 3.5–5.2)
SODIUM SERPL-SCNC: 141 MMOL/L (ref 136–145)

## 2019-08-07 ENCOUNTER — OFFICE VISIT (OUTPATIENT)
Dept: INTERNAL MEDICINE | Facility: CLINIC | Age: 74
End: 2019-08-07

## 2019-08-07 VITALS
HEIGHT: 71 IN | OXYGEN SATURATION: 98 % | SYSTOLIC BLOOD PRESSURE: 126 MMHG | HEART RATE: 53 BPM | BODY MASS INDEX: 32.34 KG/M2 | TEMPERATURE: 97.9 F | WEIGHT: 231 LBS | DIASTOLIC BLOOD PRESSURE: 70 MMHG

## 2019-08-07 DIAGNOSIS — E11.40 CONTROLLED TYPE 2 DIABETES MELLITUS WITH DIABETIC NEUROPATHY, WITHOUT LONG-TERM CURRENT USE OF INSULIN (HCC): Primary | ICD-10-CM

## 2019-08-07 DIAGNOSIS — R27.0 ATAXIA: ICD-10-CM

## 2019-08-07 DIAGNOSIS — I10 ESSENTIAL HYPERTENSION: ICD-10-CM

## 2019-08-07 PROBLEM — R94.31 ABNORMAL EKG: Status: RESOLVED | Noted: 2019-04-02 | Resolved: 2019-08-07

## 2019-08-07 PROCEDURE — 99213 OFFICE O/P EST LOW 20 MIN: CPT | Performed by: INTERNAL MEDICINE

## 2019-08-07 RX ORDER — WARFARIN SODIUM 5 MG/1
5 TABLET ORAL
COMMUNITY
End: 2019-08-12 | Stop reason: SDUPTHER

## 2019-08-07 RX ORDER — LANCING DEVICE
1 EACH MISCELLANEOUS ONCE
Qty: 1 KIT | Refills: 0 | Status: SHIPPED | OUTPATIENT
Start: 2019-08-07 | End: 2019-08-07

## 2019-08-07 RX ORDER — MIRABEGRON 50 MG/1
TABLET, FILM COATED, EXTENDED RELEASE ORAL
COMMUNITY
Start: 2019-07-26 | End: 2019-11-21 | Stop reason: ALTCHOICE

## 2019-08-12 ENCOUNTER — ANTICOAGULATION VISIT (OUTPATIENT)
Dept: PHARMACY | Facility: HOSPITAL | Age: 74
End: 2019-08-12

## 2019-08-12 DIAGNOSIS — I48.0 PAROXYSMAL ATRIAL FIBRILLATION (HCC): ICD-10-CM

## 2019-08-12 LAB
INR PPP: 2.3 (ref 0.91–1.09)
PROTHROMBIN TIME: 28.1 SECONDS (ref 10–13.8)

## 2019-08-12 PROCEDURE — 85610 PROTHROMBIN TIME: CPT

## 2019-08-12 PROCEDURE — 36416 COLLJ CAPILLARY BLOOD SPEC: CPT

## 2019-08-12 RX ORDER — WARFARIN SODIUM 5 MG/1
5 TABLET ORAL
Qty: 90 TABLET | Refills: 0 | Status: SHIPPED | OUTPATIENT
Start: 2019-08-12 | End: 2019-11-21 | Stop reason: ALTCHOICE

## 2019-08-26 ENCOUNTER — ANTICOAGULATION VISIT (OUTPATIENT)
Dept: PHARMACY | Facility: HOSPITAL | Age: 74
End: 2019-08-26

## 2019-08-26 DIAGNOSIS — I48.0 PAROXYSMAL ATRIAL FIBRILLATION (HCC): ICD-10-CM

## 2019-08-26 LAB
INR PPP: 3.7 (ref 0.91–1.09)
PROTHROMBIN TIME: 44.3 SECONDS (ref 10–13.8)

## 2019-08-26 PROCEDURE — 36416 COLLJ CAPILLARY BLOOD SPEC: CPT

## 2019-08-26 PROCEDURE — G0463 HOSPITAL OUTPT CLINIC VISIT: HCPCS

## 2019-08-26 PROCEDURE — 85610 PROTHROMBIN TIME: CPT

## 2019-09-04 ENCOUNTER — OFFICE VISIT (OUTPATIENT)
Dept: INTERNAL MEDICINE | Facility: CLINIC | Age: 74
End: 2019-09-04

## 2019-09-04 VITALS
HEART RATE: 59 BPM | WEIGHT: 229.5 LBS | HEIGHT: 71 IN | SYSTOLIC BLOOD PRESSURE: 132 MMHG | DIASTOLIC BLOOD PRESSURE: 68 MMHG | OXYGEN SATURATION: 97 % | BODY MASS INDEX: 32.13 KG/M2

## 2019-09-04 DIAGNOSIS — S01.81XD LACERATION OF OTHER PART OF HEAD WITHOUT FOREIGN BODY, SUBSEQUENT ENCOUNTER: ICD-10-CM

## 2019-09-04 DIAGNOSIS — M25.522 LEFT ELBOW PAIN: Primary | ICD-10-CM

## 2019-09-04 PROCEDURE — 99213 OFFICE O/P EST LOW 20 MIN: CPT | Performed by: INTERNAL MEDICINE

## 2019-09-05 ENCOUNTER — ANTICOAGULATION VISIT (OUTPATIENT)
Dept: PHARMACY | Facility: HOSPITAL | Age: 74
End: 2019-09-05

## 2019-09-05 DIAGNOSIS — I48.0 PAROXYSMAL ATRIAL FIBRILLATION (HCC): ICD-10-CM

## 2019-09-05 LAB
INR PPP: 2.3 (ref 0.91–1.09)
PROTHROMBIN TIME: 27.3 SECONDS (ref 10–13.8)

## 2019-09-05 PROCEDURE — 85610 PROTHROMBIN TIME: CPT

## 2019-09-05 PROCEDURE — 36416 COLLJ CAPILLARY BLOOD SPEC: CPT

## 2019-09-09 RX ORDER — METFORMIN HYDROCHLORIDE 500 MG/1
TABLET, EXTENDED RELEASE ORAL
Qty: 180 TABLET | Refills: 1 | Status: SHIPPED | OUTPATIENT
Start: 2019-09-09 | End: 2020-03-05

## 2019-09-16 RX ORDER — LANCING DEVICE
1 EACH MISCELLANEOUS ONCE
Qty: 1 KIT | Refills: 0 | Status: SHIPPED | OUTPATIENT
Start: 2019-09-16 | End: 2019-09-16

## 2019-09-16 RX ORDER — LANCING DEVICE
1 EACH MISCELLANEOUS 2 TIMES DAILY
Qty: 200 EACH | Refills: 3 | Status: SHIPPED | OUTPATIENT
Start: 2019-09-16

## 2019-09-19 ENCOUNTER — ANTICOAGULATION VISIT (OUTPATIENT)
Dept: PHARMACY | Facility: HOSPITAL | Age: 74
End: 2019-09-19

## 2019-09-19 DIAGNOSIS — I48.0 PAROXYSMAL ATRIAL FIBRILLATION (HCC): ICD-10-CM

## 2019-09-19 LAB
INR PPP: 2.3 (ref 0.91–1.09)
PROTHROMBIN TIME: 27.7 SECONDS (ref 10–13.8)

## 2019-09-19 PROCEDURE — 36416 COLLJ CAPILLARY BLOOD SPEC: CPT

## 2019-09-19 PROCEDURE — 85610 PROTHROMBIN TIME: CPT

## 2019-10-17 ENCOUNTER — ANTICOAGULATION VISIT (OUTPATIENT)
Dept: PHARMACY | Facility: HOSPITAL | Age: 74
End: 2019-10-17

## 2019-10-17 DIAGNOSIS — I48.0 PAROXYSMAL ATRIAL FIBRILLATION (HCC): ICD-10-CM

## 2019-10-17 LAB
INR PPP: 3.7 (ref 0.91–1.09)
PROTHROMBIN TIME: 45 SECONDS (ref 10–13.8)

## 2019-10-17 PROCEDURE — 85610 PROTHROMBIN TIME: CPT

## 2019-10-17 PROCEDURE — G0463 HOSPITAL OUTPT CLINIC VISIT: HCPCS

## 2019-10-17 PROCEDURE — 36416 COLLJ CAPILLARY BLOOD SPEC: CPT

## 2019-10-24 ENCOUNTER — ANTICOAGULATION VISIT (OUTPATIENT)
Dept: PHARMACY | Facility: HOSPITAL | Age: 74
End: 2019-10-24

## 2019-10-24 DIAGNOSIS — I48.0 PAROXYSMAL ATRIAL FIBRILLATION (HCC): ICD-10-CM

## 2019-10-24 LAB
INR PPP: 1.8 (ref 0.91–1.09)
PROTHROMBIN TIME: 21.8 SECONDS (ref 10–13.8)

## 2019-10-24 PROCEDURE — 85610 PROTHROMBIN TIME: CPT

## 2019-10-24 PROCEDURE — 36416 COLLJ CAPILLARY BLOOD SPEC: CPT

## 2019-10-29 RX ORDER — ATORVASTATIN CALCIUM 40 MG/1
TABLET, FILM COATED ORAL
Qty: 90 TABLET | Refills: 1 | Status: SHIPPED | OUTPATIENT
Start: 2019-10-29 | End: 2020-05-01

## 2019-10-29 RX ORDER — THIAMINE MONONITRATE (VIT B1) 100 MG
TABLET ORAL
Qty: 90 TABLET | Refills: 1 | Status: SHIPPED | OUTPATIENT
Start: 2019-10-29 | End: 2022-05-19

## 2019-10-31 DIAGNOSIS — R27.0 ATAXIA: ICD-10-CM

## 2019-10-31 DIAGNOSIS — I10 ESSENTIAL HYPERTENSION: ICD-10-CM

## 2019-10-31 DIAGNOSIS — E11.40 CONTROLLED TYPE 2 DIABETES MELLITUS WITH DIABETIC NEUROPATHY, WITHOUT LONG-TERM CURRENT USE OF INSULIN (HCC): ICD-10-CM

## 2019-11-02 LAB
ALBUMIN SERPL-MCNC: 4.2 G/DL (ref 3.5–5.2)
ALBUMIN/GLOB SERPL: 1.8 G/DL
ALP SERPL-CCNC: 86 U/L (ref 39–117)
ALT SERPL-CCNC: 8 U/L (ref 1–41)
AST SERPL-CCNC: 8 U/L (ref 1–40)
BILIRUB SERPL-MCNC: 0.4 MG/DL (ref 0.2–1.2)
BUN SERPL-MCNC: 10 MG/DL (ref 8–23)
BUN/CREAT SERPL: 9.9 (ref 7–25)
CALCIUM SERPL-MCNC: 8.4 MG/DL (ref 8.6–10.5)
CHLORIDE SERPL-SCNC: 99 MMOL/L (ref 98–107)
CHOLEST SERPL-MCNC: 82 MG/DL (ref 0–200)
CO2 SERPL-SCNC: 35.6 MMOL/L (ref 22–29)
CREAT SERPL-MCNC: 1.01 MG/DL (ref 0.76–1.27)
GLOBULIN SER CALC-MCNC: 2.4 GM/DL
GLUCOSE SERPL-MCNC: 116 MG/DL (ref 65–99)
HBA1C MFR BLD: 7.03 % (ref 4.8–5.6)
HDLC SERPL-MCNC: 31 MG/DL (ref 40–60)
LDLC SERPL CALC-MCNC: 30 MG/DL (ref 0–100)
LDLC/HDLC SERPL: 0.96 {RATIO}
POTASSIUM SERPL-SCNC: 3.3 MMOL/L (ref 3.5–5.2)
PROT SERPL-MCNC: 6.6 G/DL (ref 6–8.5)
SODIUM SERPL-SCNC: 146 MMOL/L (ref 136–145)
TRIGL SERPL-MCNC: 106 MG/DL (ref 0–150)
TSH SERPL DL<=0.005 MIU/L-ACNC: 3.29 UIU/ML (ref 0.27–4.2)
VLDLC SERPL CALC-MCNC: 21.2 MG/DL

## 2019-11-08 ENCOUNTER — LAB (OUTPATIENT)
Dept: LAB | Facility: HOSPITAL | Age: 74
End: 2019-11-08

## 2019-11-08 ENCOUNTER — ANTICOAGULATION VISIT (OUTPATIENT)
Dept: PHARMACY | Facility: HOSPITAL | Age: 74
End: 2019-11-08

## 2019-11-08 ENCOUNTER — OFFICE VISIT (OUTPATIENT)
Dept: INTERNAL MEDICINE | Facility: CLINIC | Age: 74
End: 2019-11-08

## 2019-11-08 VITALS
TEMPERATURE: 98 F | OXYGEN SATURATION: 97 % | DIASTOLIC BLOOD PRESSURE: 70 MMHG | WEIGHT: 236 LBS | HEIGHT: 71 IN | HEART RATE: 50 BPM | BODY MASS INDEX: 33.04 KG/M2 | SYSTOLIC BLOOD PRESSURE: 138 MMHG

## 2019-11-08 DIAGNOSIS — I48.0 PAROXYSMAL ATRIAL FIBRILLATION (HCC): Primary | ICD-10-CM

## 2019-11-08 DIAGNOSIS — I48.0 PAROXYSMAL ATRIAL FIBRILLATION (HCC): ICD-10-CM

## 2019-11-08 DIAGNOSIS — R42 DIZZINESS: ICD-10-CM

## 2019-11-08 DIAGNOSIS — E11.40 CONTROLLED TYPE 2 DIABETES MELLITUS WITH DIABETIC NEUROPATHY, WITHOUT LONG-TERM CURRENT USE OF INSULIN (HCC): ICD-10-CM

## 2019-11-08 DIAGNOSIS — R41.3 MEMORY LOSS: ICD-10-CM

## 2019-11-08 DIAGNOSIS — R27.0 ATAXIA: ICD-10-CM

## 2019-11-08 DIAGNOSIS — I10 ESSENTIAL HYPERTENSION: ICD-10-CM

## 2019-11-08 PROBLEM — Z86.73 HISTORY OF STROKE: Status: RESOLVED | Noted: 2019-06-03 | Resolved: 2019-11-08

## 2019-11-08 LAB
INR PPP: 1.4 (ref 0.8–1.2)
PROTHROMBIN TIME: 16.4 SECONDS (ref 12.8–15.2)

## 2019-11-08 PROCEDURE — 85610 PROTHROMBIN TIME: CPT | Performed by: INTERNAL MEDICINE

## 2019-11-08 PROCEDURE — 90653 IIV ADJUVANT VACCINE IM: CPT | Performed by: INTERNAL MEDICINE

## 2019-11-08 PROCEDURE — 99214 OFFICE O/P EST MOD 30 MIN: CPT | Performed by: INTERNAL MEDICINE

## 2019-11-08 PROCEDURE — G0008 ADMIN INFLUENZA VIRUS VAC: HCPCS | Performed by: INTERNAL MEDICINE

## 2019-11-19 ENCOUNTER — OFFICE VISIT (OUTPATIENT)
Dept: CARDIOLOGY | Facility: CLINIC | Age: 74
End: 2019-11-19

## 2019-11-19 ENCOUNTER — ANTICOAGULATION VISIT (OUTPATIENT)
Dept: PHARMACY | Facility: HOSPITAL | Age: 74
End: 2019-11-19

## 2019-11-19 VITALS
SYSTOLIC BLOOD PRESSURE: 142 MMHG | BODY MASS INDEX: 32.92 KG/M2 | OXYGEN SATURATION: 98 % | HEIGHT: 71 IN | DIASTOLIC BLOOD PRESSURE: 76 MMHG | HEART RATE: 60 BPM

## 2019-11-19 DIAGNOSIS — I48.0 PAROXYSMAL ATRIAL FIBRILLATION (HCC): ICD-10-CM

## 2019-11-19 DIAGNOSIS — E78.2 MIXED HYPERLIPIDEMIA: ICD-10-CM

## 2019-11-19 DIAGNOSIS — G45.9 TIA (TRANSIENT ISCHEMIC ATTACK): ICD-10-CM

## 2019-11-19 DIAGNOSIS — I48.0 PAF (PAROXYSMAL ATRIAL FIBRILLATION) (HCC): Primary | ICD-10-CM

## 2019-11-19 DIAGNOSIS — I10 ESSENTIAL HYPERTENSION: ICD-10-CM

## 2019-11-19 DIAGNOSIS — E11.40 CONTROLLED TYPE 2 DIABETES MELLITUS WITH DIABETIC NEUROPATHY, WITHOUT LONG-TERM CURRENT USE OF INSULIN (HCC): ICD-10-CM

## 2019-11-19 LAB
INR PPP: 3 (ref 0.91–1.09)
PROTHROMBIN TIME: 35.9 SECONDS (ref 10–13.8)

## 2019-11-19 PROCEDURE — 99214 OFFICE O/P EST MOD 30 MIN: CPT | Performed by: NURSE PRACTITIONER

## 2019-11-19 PROCEDURE — 36416 COLLJ CAPILLARY BLOOD SPEC: CPT

## 2019-11-19 PROCEDURE — 85610 PROTHROMBIN TIME: CPT

## 2019-11-19 PROCEDURE — G0463 HOSPITAL OUTPT CLINIC VISIT: HCPCS

## 2019-11-19 RX ORDER — METOPROLOL SUCCINATE 25 MG/1
12.5 TABLET, EXTENDED RELEASE ORAL DAILY
Qty: 30 TABLET | Refills: 11 | Status: SHIPPED | OUTPATIENT
Start: 2019-11-19 | End: 2020-12-28 | Stop reason: SDUPTHER

## 2019-11-21 ENCOUNTER — OFFICE VISIT (OUTPATIENT)
Dept: NEUROLOGY | Facility: CLINIC | Age: 74
End: 2019-11-21

## 2019-11-21 VITALS
DIASTOLIC BLOOD PRESSURE: 68 MMHG | SYSTOLIC BLOOD PRESSURE: 148 MMHG | BODY MASS INDEX: 32.76 KG/M2 | HEIGHT: 71 IN | WEIGHT: 234 LBS | HEART RATE: 65 BPM | OXYGEN SATURATION: 98 %

## 2019-11-21 DIAGNOSIS — G45.9 TIA (TRANSIENT ISCHEMIC ATTACK): Primary | ICD-10-CM

## 2019-11-21 DIAGNOSIS — I48.0 PAROXYSMAL ATRIAL FIBRILLATION (HCC): ICD-10-CM

## 2019-11-21 DIAGNOSIS — R41.89 COGNITIVE IMPAIRMENT: ICD-10-CM

## 2019-11-21 DIAGNOSIS — Z86.73 HISTORY OF STROKE: ICD-10-CM

## 2019-11-21 DIAGNOSIS — I10 ESSENTIAL HYPERTENSION: ICD-10-CM
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
## 2019-11-21 DIAGNOSIS — E78.2 MIXED HYPERLIPIDEMIA: ICD-10-CM

## 2019-11-21 PROBLEM — R29.6 FALLS: Status: ACTIVE | Noted: 2019-11-21

## 2019-11-21 PROBLEM — E11.42 DIABETIC POLYNEUROPATHY ASSOCIATED WITH TYPE 2 DIABETES MELLITUS: Status: ACTIVE | Noted: 2019-11-21
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
## 2019-11-21 PROCEDURE — 99215 OFFICE O/P EST HI 40 MIN: CPT | Performed by: NURSE PRACTITIONER

## 2019-11-25 ENCOUNTER — TELEPHONE (OUTPATIENT)
Dept: NEUROLOGY | Facility: CLINIC | Age: 74
End: 2019-11-25

## 2019-11-26 ENCOUNTER — TELEPHONE (OUTPATIENT)
Dept: NEUROLOGY | Facility: CLINIC | Age: 74
End: 2019-11-26
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
## 2019-11-26 DIAGNOSIS — R35.0 URINARY FREQUENCY: ICD-10-CM

## 2019-11-26 DIAGNOSIS — R41.89 COGNITIVE IMPAIRMENT: Primary | ICD-10-CM

## 2019-12-04 RX ORDER — LEVOTHYROXINE SODIUM 0.05 MG/1
TABLET ORAL
Qty: 90 TABLET | Refills: 1 | Status: SHIPPED | OUTPATIENT
Start: 2019-12-04 | End: 2020-06-04

## 2019-12-05 ENCOUNTER — ANTICOAGULATION VISIT (OUTPATIENT)
Dept: PHARMACY | Facility: HOSPITAL | Age: 74
End: 2019-12-05

## 2019-12-05 DIAGNOSIS — I48.0 PAROXYSMAL ATRIAL FIBRILLATION (HCC): ICD-10-CM

## 2019-12-05 LAB
INR PPP: 3.2 (ref 0.91–1.09)
PROTHROMBIN TIME: 37.8 SECONDS (ref 10–13.8)

## 2019-12-05 PROCEDURE — 85610 PROTHROMBIN TIME: CPT

## 2019-12-05 PROCEDURE — 36416 COLLJ CAPILLARY BLOOD SPEC: CPT

## 2019-12-05 PROCEDURE — G0463 HOSPITAL OUTPT CLINIC VISIT: HCPCS

## 2019-12-19 ENCOUNTER — ANTICOAGULATION VISIT (OUTPATIENT)
Dept: PHARMACY | Facility: HOSPITAL | Age: 74
End: 2019-12-19

## 2019-12-19 DIAGNOSIS — I48.0 PAROXYSMAL ATRIAL FIBRILLATION (HCC): ICD-10-CM

## 2019-12-19 LAB
INR PPP: 2.8 (ref 0.91–1.09)
PROTHROMBIN TIME: 33.1 SECONDS (ref 10–13.8)

## 2019-12-19 PROCEDURE — 36416 COLLJ CAPILLARY BLOOD SPEC: CPT

## 2019-12-19 PROCEDURE — 85610 PROTHROMBIN TIME: CPT

## 2020-01-02 ENCOUNTER — ANTICOAGULATION VISIT (OUTPATIENT)
Dept: PHARMACY | Facility: HOSPITAL | Age: 75
End: 2020-01-02

## 2020-01-02 DIAGNOSIS — I48.0 PAROXYSMAL ATRIAL FIBRILLATION (HCC): ICD-10-CM

## 2020-01-02 LAB
INR PPP: 3 (ref 0.91–1.09)
PROTHROMBIN TIME: 35.7 SECONDS (ref 10–13.8)

## 2020-01-02 PROCEDURE — 85610 PROTHROMBIN TIME: CPT

## 2020-01-02 PROCEDURE — 36416 COLLJ CAPILLARY BLOOD SPEC: CPT

## 2020-01-02 RX ORDER — WARFARIN SODIUM 5 MG/1
TABLET ORAL
Qty: 90 TABLET | Refills: 1 | Status: SHIPPED | OUTPATIENT
Start: 2020-01-02 | End: 2020-01-03

## 2020-01-03 RX ORDER — WARFARIN SODIUM 5 MG/1
TABLET ORAL
Qty: 90 TABLET | Refills: 0 | Status: SHIPPED | OUTPATIENT
Start: 2020-01-03 | End: 2020-09-14

## 2020-01-09 ENCOUNTER — OFFICE VISIT (OUTPATIENT)
Dept: CARDIOLOGY | Facility: CLINIC | Age: 75
End: 2020-01-09

## 2020-01-09 VITALS
BODY MASS INDEX: 33.21 KG/M2 | WEIGHT: 232 LBS | HEART RATE: 55 BPM | DIASTOLIC BLOOD PRESSURE: 68 MMHG | HEIGHT: 70 IN | OXYGEN SATURATION: 98 % | SYSTOLIC BLOOD PRESSURE: 118 MMHG

## 2020-01-09 DIAGNOSIS — I48.0 PAF (PAROXYSMAL ATRIAL FIBRILLATION) (HCC): Primary | ICD-10-CM

## 2020-01-09 DIAGNOSIS — E11.40 CONTROLLED TYPE 2 DIABETES MELLITUS WITH DIABETIC NEUROPATHY, WITHOUT LONG-TERM CURRENT USE OF INSULIN (HCC): ICD-10-CM

## 2020-01-09 DIAGNOSIS — I10 ESSENTIAL HYPERTENSION: ICD-10-CM

## 2020-01-09 DIAGNOSIS — E78.2 MIXED HYPERLIPIDEMIA: ICD-10-CM

## 2020-01-09 DIAGNOSIS — G45.9 TIA (TRANSIENT ISCHEMIC ATTACK): ICD-10-CM

## 2020-01-09 PROCEDURE — 99214 OFFICE O/P EST MOD 30 MIN: CPT | Performed by: NURSE PRACTITIONER

## 2020-01-28 ENCOUNTER — APPOINTMENT (OUTPATIENT)
Dept: PHARMACY | Facility: HOSPITAL | Age: 75
End: 2020-01-28

## 2020-01-30 DIAGNOSIS — E11.40 CONTROLLED TYPE 2 DIABETES MELLITUS WITH DIABETIC NEUROPATHY, WITHOUT LONG-TERM CURRENT USE OF INSULIN (HCC): ICD-10-CM

## 2020-01-30 DIAGNOSIS — I10 ESSENTIAL HYPERTENSION: ICD-10-CM

## 2020-01-31 RX ORDER — LISINOPRIL AND HYDROCHLOROTHIAZIDE 20; 12.5 MG/1; MG/1
TABLET ORAL
Qty: 90 TABLET | Refills: 3 | Status: SHIPPED | OUTPATIENT
Start: 2020-01-31 | End: 2021-04-12

## 2020-01-31 RX ORDER — TRAZODONE HYDROCHLORIDE 50 MG/1
TABLET ORAL
Qty: 90 TABLET | Refills: 3 | Status: SHIPPED | OUTPATIENT
Start: 2020-01-31 | End: 2021-04-29

## 2020-02-01 LAB
ALBUMIN SERPL-MCNC: 4.1 G/DL (ref 3.7–4.7)
ALBUMIN/GLOB SERPL: 1.7 {RATIO} (ref 1.2–2.2)
ALP SERPL-CCNC: 87 IU/L (ref 39–117)
ALT SERPL-CCNC: 13 IU/L (ref 0–44)
AST SERPL-CCNC: 18 IU/L (ref 0–40)
BASOPHILS # BLD AUTO: 0.1 X10E3/UL (ref 0–0.2)
BASOPHILS NFR BLD AUTO: 1 %
BILIRUB SERPL-MCNC: 0.7 MG/DL (ref 0–1.2)
BUN SERPL-MCNC: 7 MG/DL (ref 8–27)
BUN/CREAT SERPL: 7 (ref 10–24)
CALCIUM SERPL-MCNC: 7.3 MG/DL (ref 8.6–10.2)
CHLORIDE SERPL-SCNC: 92 MMOL/L (ref 96–106)
CO2 SERPL-SCNC: 35 MMOL/L (ref 20–29)
CREAT SERPL-MCNC: 1.02 MG/DL (ref 0.76–1.27)
EOSINOPHIL # BLD AUTO: 0.3 X10E3/UL (ref 0–0.4)
EOSINOPHIL NFR BLD AUTO: 3 %
ERYTHROCYTE [DISTWIDTH] IN BLOOD BY AUTOMATED COUNT: 13.3 % (ref 11.6–15.4)
GLOBULIN SER CALC-MCNC: 2.4 G/DL (ref 1.5–4.5)
GLUCOSE SERPL-MCNC: 113 MG/DL (ref 65–99)
HBA1C MFR BLD: 6.4 % (ref 4.8–5.6)
HCT VFR BLD AUTO: 40.7 % (ref 37.5–51)
HGB BLD-MCNC: 13.2 G/DL (ref 13–17.7)
IMM GRANULOCYTES # BLD AUTO: 0 X10E3/UL (ref 0–0.1)
IMM GRANULOCYTES NFR BLD AUTO: 0 %
LYMPHOCYTES # BLD AUTO: 2.3 X10E3/UL (ref 0.7–3.1)
LYMPHOCYTES NFR BLD AUTO: 25 %
MCH RBC QN AUTO: 26.8 PG (ref 26.6–33)
MCHC RBC AUTO-ENTMCNC: 32.4 G/DL (ref 31.5–35.7)
MCV RBC AUTO: 83 FL (ref 79–97)
MONOCYTES # BLD AUTO: 0.8 X10E3/UL (ref 0.1–0.9)
MONOCYTES NFR BLD AUTO: 8 %
NEUTROPHILS # BLD AUTO: 6 X10E3/UL (ref 1.4–7)
NEUTROPHILS NFR BLD AUTO: 63 %
PLATELET # BLD AUTO: 244 X10E3/UL (ref 150–450)
POTASSIUM SERPL-SCNC: 2.6 MMOL/L (ref 3.5–5.2)
PROT SERPL-MCNC: 6.5 G/DL (ref 6–8.5)
RBC # BLD AUTO: 4.92 X10E6/UL (ref 4.14–5.8)
SODIUM SERPL-SCNC: 146 MMOL/L (ref 134–144)
WBC # BLD AUTO: 9.4 X10E3/UL (ref 3.4–10.8)

## 2020-02-03 RX ORDER — POTASSIUM CHLORIDE 20 MEQ/1
20 TABLET, EXTENDED RELEASE ORAL 2 TIMES DAILY
Qty: 6 TABLET | Refills: 0 | Status: SHIPPED | OUTPATIENT
Start: 2020-02-03 | End: 2020-02-07 | Stop reason: SDUPTHER

## 2020-02-04 ENCOUNTER — ANTICOAGULATION VISIT (OUTPATIENT)
Dept: PHARMACY | Facility: HOSPITAL | Age: 75
End: 2020-02-04

## 2020-02-04 DIAGNOSIS — I48.0 PAROXYSMAL ATRIAL FIBRILLATION (HCC): ICD-10-CM

## 2020-02-04 LAB
INR PPP: 3.8 (ref 0.91–1.09)
PROTHROMBIN TIME: 45.9 SECONDS (ref 10–13.8)

## 2020-02-04 PROCEDURE — G0463 HOSPITAL OUTPT CLINIC VISIT: HCPCS

## 2020-02-04 PROCEDURE — 85610 PROTHROMBIN TIME: CPT

## 2020-02-04 PROCEDURE — 36416 COLLJ CAPILLARY BLOOD SPEC: CPT

## 2020-02-07 ENCOUNTER — APPOINTMENT (OUTPATIENT)
Dept: LAB | Facility: HOSPITAL | Age: 75
End: 2020-02-07

## 2020-02-07 ENCOUNTER — OFFICE VISIT (OUTPATIENT)
Dept: INTERNAL MEDICINE | Facility: CLINIC | Age: 75
End: 2020-02-07

## 2020-02-07 VITALS
HEART RATE: 60 BPM | DIASTOLIC BLOOD PRESSURE: 64 MMHG | HEIGHT: 71 IN | BODY MASS INDEX: 32.1 KG/M2 | TEMPERATURE: 97.9 F | SYSTOLIC BLOOD PRESSURE: 124 MMHG | OXYGEN SATURATION: 97 % | WEIGHT: 229.3 LBS

## 2020-02-07 DIAGNOSIS — E87.6 HYPOKALEMIA: ICD-10-CM

## 2020-02-07 DIAGNOSIS — K21.00 GASTROESOPHAGEAL REFLUX DISEASE WITH ESOPHAGITIS: ICD-10-CM

## 2020-02-07 DIAGNOSIS — Z00.00 MEDICARE ANNUAL WELLNESS VISIT, SUBSEQUENT: Primary | ICD-10-CM

## 2020-02-07 DIAGNOSIS — E87.6 HYPOKALEMIA: Primary | ICD-10-CM

## 2020-02-07 DIAGNOSIS — E11.42 DIABETIC POLYNEUROPATHY ASSOCIATED WITH TYPE 2 DIABETES MELLITUS (HCC): ICD-10-CM

## 2020-02-07 DIAGNOSIS — I10 ESSENTIAL HYPERTENSION: ICD-10-CM

## 2020-02-07 LAB
ANION GAP SERPL CALCULATED.3IONS-SCNC: 13.6 MMOL/L (ref 5–15)
BUN BLD-MCNC: 11 MG/DL (ref 8–23)
BUN/CREAT SERPL: 12.5 (ref 7–25)
CALCIUM SPEC-SCNC: 8 MG/DL (ref 8.6–10.5)
CHLORIDE SERPL-SCNC: 98 MMOL/L (ref 98–107)
CO2 SERPL-SCNC: 32.4 MMOL/L (ref 22–29)
CREAT BLD-MCNC: 0.88 MG/DL (ref 0.76–1.27)
GFR SERPL CREATININE-BSD FRML MDRD: 85 ML/MIN/1.73
GLUCOSE BLD-MCNC: 156 MG/DL (ref 65–99)
POTASSIUM BLD-SCNC: 3.1 MMOL/L (ref 3.5–5.2)
SODIUM BLD-SCNC: 144 MMOL/L (ref 136–145)

## 2020-02-07 PROCEDURE — 36415 COLL VENOUS BLD VENIPUNCTURE: CPT | Performed by: INTERNAL MEDICINE

## 2020-02-07 PROCEDURE — 80048 BASIC METABOLIC PNL TOTAL CA: CPT | Performed by: INTERNAL MEDICINE

## 2020-02-07 PROCEDURE — G0439 PPPS, SUBSEQ VISIT: HCPCS | Performed by: INTERNAL MEDICINE

## 2020-02-07 PROCEDURE — 99214 OFFICE O/P EST MOD 30 MIN: CPT | Performed by: INTERNAL MEDICINE

## 2020-02-07 RX ORDER — POTASSIUM CHLORIDE 20 MEQ/1
20 TABLET, EXTENDED RELEASE ORAL 2 TIMES DAILY
Qty: 6 TABLET | Refills: 0 | Status: SHIPPED | OUTPATIENT
Start: 2020-02-07 | End: 2020-02-28

## 2020-02-14 ENCOUNTER — RESULTS ENCOUNTER (OUTPATIENT)
Dept: INTERNAL MEDICINE | Facility: CLINIC | Age: 75
End: 2020-02-14

## 2020-02-14 DIAGNOSIS — E87.6 HYPOKALEMIA: ICD-10-CM

## 2020-02-18 ENCOUNTER — ANTICOAGULATION VISIT (OUTPATIENT)
Dept: PHARMACY | Facility: HOSPITAL | Age: 75
End: 2020-02-18

## 2020-02-18 DIAGNOSIS — I48.0 PAROXYSMAL ATRIAL FIBRILLATION (HCC): ICD-10-CM

## 2020-02-18 LAB
INR PPP: 2.1 (ref 0.91–1.09)
PROTHROMBIN TIME: 24.7 SECONDS (ref 10–13.8)

## 2020-02-18 PROCEDURE — 36416 COLLJ CAPILLARY BLOOD SPEC: CPT

## 2020-02-18 PROCEDURE — 85610 PROTHROMBIN TIME: CPT

## 2020-02-25 LAB
BUN SERPL-MCNC: 12 MG/DL (ref 8–23)
BUN/CREAT SERPL: 12.6 (ref 7–25)
CALCIUM SERPL-MCNC: 7.9 MG/DL (ref 8.6–10.5)
CHLORIDE SERPL-SCNC: 96 MMOL/L (ref 98–107)
CO2 SERPL-SCNC: 35.7 MMOL/L (ref 22–29)
CREAT SERPL-MCNC: 0.95 MG/DL (ref 0.76–1.27)
GLUCOSE SERPL-MCNC: 243 MG/DL (ref 65–99)
POTASSIUM SERPL-SCNC: 3 MMOL/L (ref 3.5–5.2)
SODIUM SERPL-SCNC: 144 MMOL/L (ref 136–145)

## 2020-02-27 LAB
MAGNESIUM SERPL-MCNC: 0.6 MG/DL (ref 1.6–2.4)
WRITTEN AUTHORIZATION: NORMAL

## 2020-02-28 DIAGNOSIS — E87.6 HYPOKALEMIA: Primary | ICD-10-CM

## 2020-02-28 RX ORDER — POTASSIUM CHLORIDE 750 MG/1
10 TABLET, FILM COATED, EXTENDED RELEASE ORAL 2 TIMES DAILY
Qty: 14 TABLET | Refills: 0 | Status: SHIPPED | OUTPATIENT
Start: 2020-02-28 | End: 2020-05-01 | Stop reason: SDUPTHER

## 2020-02-28 RX ORDER — MAGNESIUM OXIDE 400 MG/1
400 TABLET ORAL 2 TIMES DAILY
Qty: 14 TABLET | Refills: 0 | Status: SHIPPED | OUTPATIENT
Start: 2020-02-28 | End: 2020-03-10 | Stop reason: SDUPTHER

## 2020-03-02 ENCOUNTER — ANTICOAGULATION VISIT (OUTPATIENT)
Dept: PHARMACY | Facility: HOSPITAL | Age: 75
End: 2020-03-02

## 2020-03-02 DIAGNOSIS — I48.0 PAROXYSMAL ATRIAL FIBRILLATION (HCC): ICD-10-CM

## 2020-03-02 LAB
INR PPP: 1.6 (ref 0.91–1.09)
PROTHROMBIN TIME: 18.8 SECONDS (ref 10–13.8)

## 2020-03-02 PROCEDURE — 36416 COLLJ CAPILLARY BLOOD SPEC: CPT

## 2020-03-02 PROCEDURE — G0463 HOSPITAL OUTPT CLINIC VISIT: HCPCS

## 2020-03-02 PROCEDURE — 85610 PROTHROMBIN TIME: CPT

## 2020-03-05 RX ORDER — METFORMIN HYDROCHLORIDE 500 MG/1
TABLET, EXTENDED RELEASE ORAL
Qty: 180 TABLET | Refills: 1 | Status: SHIPPED | OUTPATIENT
Start: 2020-03-05 | End: 2020-11-12

## 2020-03-06 ENCOUNTER — RESULTS ENCOUNTER (OUTPATIENT)
Dept: INTERNAL MEDICINE | Facility: CLINIC | Age: 75
End: 2020-03-06

## 2020-03-06 DIAGNOSIS — E87.6 HYPOKALEMIA: ICD-10-CM

## 2020-03-07 LAB
BUN SERPL-MCNC: 10 MG/DL (ref 8–23)
BUN/CREAT SERPL: 10.9 (ref 7–25)
CALCIUM SERPL-MCNC: 7.7 MG/DL (ref 8.6–10.5)
CHLORIDE SERPL-SCNC: 98 MMOL/L (ref 98–107)
CO2 SERPL-SCNC: 33.8 MMOL/L (ref 22–29)
CREAT SERPL-MCNC: 0.92 MG/DL (ref 0.76–1.27)
GLUCOSE SERPL-MCNC: 142 MG/DL (ref 65–99)
MAGNESIUM SERPL-MCNC: 1 MG/DL (ref 1.6–2.4)
POTASSIUM SERPL-SCNC: 3.6 MMOL/L (ref 3.5–5.2)
SODIUM SERPL-SCNC: 144 MMOL/L (ref 136–145)

## 2020-03-09 ENCOUNTER — OFFICE VISIT (OUTPATIENT)
Dept: NEUROSURGERY | Facility: CLINIC | Age: 75
End: 2020-03-09

## 2020-03-09 VITALS
HEART RATE: 64 BPM | DIASTOLIC BLOOD PRESSURE: 72 MMHG | BODY MASS INDEX: 32.78 KG/M2 | HEIGHT: 70 IN | WEIGHT: 229 LBS | SYSTOLIC BLOOD PRESSURE: 138 MMHG

## 2020-03-09 DIAGNOSIS — R26.9 ABNORMALITY OF GAIT: Primary | ICD-10-CM

## 2020-03-09 DIAGNOSIS — G91.8 HYDROCEPHALUS EX VACUO (HCC): ICD-10-CM

## 2020-03-09 PROCEDURE — 99204 OFFICE O/P NEW MOD 45 MIN: CPT | Performed by: NEUROLOGICAL SURGERY

## 2020-03-09 RX ORDER — POTASSIUM CHLORIDE 750 MG/1
TABLET, EXTENDED RELEASE ORAL
COMMUNITY
Start: 2020-02-28 | End: 2020-05-01 | Stop reason: SDUPTHER

## 2020-03-10 DIAGNOSIS — E87.6 HYPOKALEMIA: Primary | ICD-10-CM

## 2020-03-10 RX ORDER — MAGNESIUM OXIDE 400 MG/1
400 TABLET ORAL 2 TIMES DAILY
Qty: 14 TABLET | Refills: 0 | Status: SHIPPED | OUTPATIENT
Start: 2020-03-10 | End: 2020-06-23

## 2020-03-16 ENCOUNTER — ANTICOAGULATION VISIT (OUTPATIENT)
Dept: PHARMACY | Facility: HOSPITAL | Age: 75
End: 2020-03-16

## 2020-03-16 DIAGNOSIS — I48.0 PAROXYSMAL ATRIAL FIBRILLATION (HCC): ICD-10-CM

## 2020-03-16 LAB
INR PPP: 2.8 (ref 0.91–1.09)
PROTHROMBIN TIME: 33.6 SECONDS (ref 10–13.8)

## 2020-03-16 PROCEDURE — 36416 COLLJ CAPILLARY BLOOD SPEC: CPT

## 2020-03-16 PROCEDURE — 85610 PROTHROMBIN TIME: CPT

## 2020-04-06 ENCOUNTER — ANTICOAGULATION VISIT (OUTPATIENT)
Dept: PHARMACY | Facility: HOSPITAL | Age: 75
End: 2020-04-06

## 2020-04-06 DIAGNOSIS — I48.0 PAROXYSMAL ATRIAL FIBRILLATION (HCC): ICD-10-CM

## 2020-04-06 LAB
INR PPP: 4 (ref 0.91–1.09)
PROTHROMBIN TIME: 47.5 SECONDS (ref 10–13.8)

## 2020-04-06 PROCEDURE — 36416 COLLJ CAPILLARY BLOOD SPEC: CPT

## 2020-04-06 PROCEDURE — 85610 PROTHROMBIN TIME: CPT

## 2020-04-10 ENCOUNTER — RESULTS ENCOUNTER (OUTPATIENT)
Dept: INTERNAL MEDICINE | Facility: CLINIC | Age: 75
End: 2020-04-10

## 2020-04-10 DIAGNOSIS — E87.6 HYPOKALEMIA: ICD-10-CM

## 2020-04-30 DIAGNOSIS — E11.42 DIABETIC POLYNEUROPATHY ASSOCIATED WITH TYPE 2 DIABETES MELLITUS (HCC): ICD-10-CM

## 2020-04-30 DIAGNOSIS — K21.00 GASTROESOPHAGEAL REFLUX DISEASE WITH ESOPHAGITIS: ICD-10-CM

## 2020-04-30 DIAGNOSIS — E87.6 HYPOKALEMIA: ICD-10-CM

## 2020-05-01 LAB
ALBUMIN SERPL-MCNC: 4 G/DL (ref 3.5–5.2)
ALBUMIN/GLOB SERPL: 1.4 G/DL
ALP SERPL-CCNC: 86 U/L (ref 39–117)
ALT SERPL-CCNC: 15 U/L (ref 1–41)
AST SERPL-CCNC: 17 U/L (ref 1–40)
BASOPHILS # BLD AUTO: 0.03 10*3/MM3 (ref 0–0.2)
BASOPHILS NFR BLD AUTO: 0.3 % (ref 0–1.5)
BILIRUB SERPL-MCNC: 0.5 MG/DL (ref 0.2–1.2)
BUN SERPL-MCNC: 13 MG/DL (ref 8–23)
BUN/CREAT SERPL: 13.3 (ref 7–25)
CALCIUM SERPL-MCNC: 7.5 MG/DL (ref 8.6–10.5)
CHLORIDE SERPL-SCNC: 92 MMOL/L (ref 98–107)
CHOLEST SERPL-MCNC: 114 MG/DL (ref 0–200)
CO2 SERPL-SCNC: 35.4 MMOL/L (ref 22–29)
CREAT SERPL-MCNC: 0.98 MG/DL (ref 0.76–1.27)
EOSINOPHIL # BLD AUTO: 0.3 10*3/MM3 (ref 0–0.4)
EOSINOPHIL NFR BLD AUTO: 3.3 % (ref 0.3–6.2)
ERYTHROCYTE [DISTWIDTH] IN BLOOD BY AUTOMATED COUNT: 13.8 % (ref 12.3–15.4)
GLOBULIN SER CALC-MCNC: 2.8 GM/DL
GLUCOSE SERPL-MCNC: 134 MG/DL (ref 65–99)
HBA1C MFR BLD: 6.7 % (ref 4.8–5.6)
HCT VFR BLD AUTO: 39 % (ref 37.5–51)
HDLC SERPL-MCNC: 33 MG/DL (ref 40–60)
HGB BLD-MCNC: 13.1 G/DL (ref 13–17.7)
IMM GRANULOCYTES # BLD AUTO: 0.03 10*3/MM3 (ref 0–0.05)
IMM GRANULOCYTES NFR BLD AUTO: 0.3 % (ref 0–0.5)
LDLC SERPL CALC-MCNC: 40 MG/DL (ref 0–100)
LDLC/HDLC SERPL: 1.22 {RATIO}
LYMPHOCYTES # BLD AUTO: 2.77 10*3/MM3 (ref 0.7–3.1)
LYMPHOCYTES NFR BLD AUTO: 30.3 % (ref 19.6–45.3)
MCH RBC QN AUTO: 27.2 PG (ref 26.6–33)
MCHC RBC AUTO-ENTMCNC: 33.6 G/DL (ref 31.5–35.7)
MCV RBC AUTO: 80.9 FL (ref 79–97)
MONOCYTES # BLD AUTO: 0.81 10*3/MM3 (ref 0.1–0.9)
MONOCYTES NFR BLD AUTO: 8.9 % (ref 5–12)
NEUTROPHILS # BLD AUTO: 5.21 10*3/MM3 (ref 1.7–7)
NEUTROPHILS NFR BLD AUTO: 56.9 % (ref 42.7–76)
NRBC BLD AUTO-RTO: 0.1 /100 WBC (ref 0–0.2)
PLATELET # BLD AUTO: 231 10*3/MM3 (ref 140–450)
POTASSIUM SERPL-SCNC: 2.7 MMOL/L (ref 3.5–5.2)
PROT SERPL-MCNC: 6.8 G/DL (ref 6–8.5)
RBC # BLD AUTO: 4.82 10*6/MM3 (ref 4.14–5.8)
SODIUM SERPL-SCNC: 141 MMOL/L (ref 136–145)
TRIGL SERPL-MCNC: 203 MG/DL (ref 0–150)
VLDLC SERPL CALC-MCNC: 40.6 MG/DL (ref 5–40)
WBC # BLD AUTO: 9.15 10*3/MM3 (ref 3.4–10.8)

## 2020-05-01 RX ORDER — POTASSIUM CHLORIDE 750 MG/1
TABLET, FILM COATED, EXTENDED RELEASE ORAL
Qty: 66 TABLET | Refills: 0 | Status: SHIPPED | OUTPATIENT
Start: 2020-05-01 | End: 2020-06-04

## 2020-05-01 RX ORDER — ATORVASTATIN CALCIUM 40 MG/1
TABLET, FILM COATED ORAL
Qty: 90 TABLET | Refills: 1 | Status: SHIPPED | OUTPATIENT
Start: 2020-05-01 | End: 2020-12-14

## 2020-05-05 ENCOUNTER — OFFICE VISIT (OUTPATIENT)
Dept: INTERNAL MEDICINE | Facility: CLINIC | Age: 75
End: 2020-05-05

## 2020-05-05 VITALS
OXYGEN SATURATION: 97 % | DIASTOLIC BLOOD PRESSURE: 78 MMHG | HEART RATE: 61 BPM | SYSTOLIC BLOOD PRESSURE: 156 MMHG | WEIGHT: 227.3 LBS | BODY MASS INDEX: 32.54 KG/M2 | HEIGHT: 70 IN

## 2020-05-05 DIAGNOSIS — E03.8 OTHER SPECIFIED HYPOTHYROIDISM: ICD-10-CM

## 2020-05-05 DIAGNOSIS — E78.2 MIXED HYPERLIPIDEMIA: ICD-10-CM

## 2020-05-05 DIAGNOSIS — E11.40 CONTROLLED TYPE 2 DIABETES MELLITUS WITH DIABETIC NEUROPATHY, WITHOUT LONG-TERM CURRENT USE OF INSULIN (HCC): ICD-10-CM

## 2020-05-05 DIAGNOSIS — I10 HYPERTENSION, UNSPECIFIED TYPE: Primary | ICD-10-CM

## 2020-05-05 PROBLEM — E11.42 DIABETIC POLYNEUROPATHY ASSOCIATED WITH TYPE 2 DIABETES MELLITUS (HCC): Status: RESOLVED | Noted: 2019-11-21 | Resolved: 2020-05-05

## 2020-05-05 PROCEDURE — 99213 OFFICE O/P EST LOW 20 MIN: CPT | Performed by: INTERNAL MEDICINE

## 2020-05-06 DIAGNOSIS — E87.6 HYPOKALEMIA: Primary | ICD-10-CM

## 2020-05-06 LAB
BUN SERPL-MCNC: 11 MG/DL (ref 8–27)
BUN/CREAT SERPL: 12 (ref 10–24)
CALCIUM SERPL-MCNC: 8.5 MG/DL (ref 8.6–10.2)
CHLORIDE SERPL-SCNC: 100 MMOL/L (ref 96–106)
CO2 SERPL-SCNC: 32 MMOL/L (ref 20–29)
CREAT SERPL-MCNC: 0.92 MG/DL (ref 0.76–1.27)
GLUCOSE SERPL-MCNC: ABNORMAL MG/DL
MAGNESIUM SERPL-MCNC: 1.8 MG/DL (ref 1.6–2.3)
POTASSIUM SERPL-SCNC: ABNORMAL MMOL/L
SODIUM SERPL-SCNC: 144 MMOL/L (ref 134–144)

## 2020-05-07 LAB
BUN SERPL-MCNC: 11 MG/DL (ref 8–23)
BUN/CREAT SERPL: 13.3 (ref 7–25)
CALCIUM SERPL-MCNC: 9 MG/DL (ref 8.6–10.5)
CHLORIDE SERPL-SCNC: 99 MMOL/L (ref 98–107)
CO2 SERPL-SCNC: 30.8 MMOL/L (ref 22–29)
CREAT SERPL-MCNC: 0.83 MG/DL (ref 0.76–1.27)
GLUCOSE SERPL-MCNC: 140 MG/DL (ref 65–99)
POTASSIUM SERPL-SCNC: 4.1 MMOL/L (ref 3.5–5.2)
SODIUM SERPL-SCNC: 142 MMOL/L (ref 136–145)

## 2020-05-14 ENCOUNTER — TELEMEDICINE (OUTPATIENT)
Dept: NEUROLOGY | Facility: CLINIC | Age: 75
End: 2020-05-14

## 2020-05-14 DIAGNOSIS — R29.6 FREQUENT FALLS: Primary | ICD-10-CM

## 2020-05-14 DIAGNOSIS — I48.0 PAROXYSMAL ATRIAL FIBRILLATION (HCC): ICD-10-CM

## 2020-05-14 DIAGNOSIS — I10 ESSENTIAL HYPERTENSION: ICD-10-CM

## 2020-05-14 DIAGNOSIS — R41.89 COGNITIVE IMPAIRMENT: ICD-10-CM

## 2020-05-14 DIAGNOSIS — E11.40 CONTROLLED TYPE 2 DIABETES MELLITUS WITH DIABETIC NEUROPATHY, WITHOUT LONG-TERM CURRENT USE OF INSULIN (HCC): ICD-10-CM

## 2020-05-14 DIAGNOSIS — G45.9 TIA (TRANSIENT ISCHEMIC ATTACK): ICD-10-CM

## 2020-05-14 DIAGNOSIS — R35.0 URINARY FREQUENCY: ICD-10-CM
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
## 2020-05-14 DIAGNOSIS — E78.2 MIXED HYPERLIPIDEMIA: ICD-10-CM

## 2020-05-14 PROCEDURE — 99214 OFFICE O/P EST MOD 30 MIN: CPT | Performed by: NURSE PRACTITIONER

## 2020-06-04 RX ORDER — LEVOTHYROXINE SODIUM 0.05 MG/1
TABLET ORAL
Qty: 90 TABLET | Refills: 1 | Status: SHIPPED | OUTPATIENT
Start: 2020-06-04 | End: 2021-02-01

## 2020-06-04 RX ORDER — POTASSIUM CHLORIDE 750 MG/1
10 TABLET, FILM COATED, EXTENDED RELEASE ORAL DAILY
Qty: 90 TABLET | Refills: 1 | Status: SHIPPED | OUTPATIENT
Start: 2020-06-04 | End: 2021-02-22

## 2020-06-22 ENCOUNTER — TELEPHONE (OUTPATIENT)
Dept: PHARMACY | Facility: HOSPITAL | Age: 75
End: 2020-06-22

## 2020-06-23 ENCOUNTER — HOSPITAL ENCOUNTER (OUTPATIENT)
Dept: GENERAL RADIOLOGY | Facility: HOSPITAL | Age: 75
Discharge: HOME | End: 2020-06-23
Admitting: NURSE PRACTITIONER

## 2020-06-23 ENCOUNTER — OFFICE VISIT (OUTPATIENT)
Dept: INTERNAL MEDICINE | Facility: CLINIC | Age: 75
End: 2020-06-23

## 2020-06-23 VITALS
BODY MASS INDEX: 33.39 KG/M2 | HEIGHT: 70 IN | TEMPERATURE: 99 F | SYSTOLIC BLOOD PRESSURE: 120 MMHG | HEART RATE: 63 BPM | OXYGEN SATURATION: 96 % | DIASTOLIC BLOOD PRESSURE: 72 MMHG | WEIGHT: 233.2 LBS

## 2020-06-23 DIAGNOSIS — M25.561 POSTERIOR KNEE PAIN, RIGHT: Primary | ICD-10-CM

## 2020-06-23 PROCEDURE — 99213 OFFICE O/P EST LOW 20 MIN: CPT | Performed by: NURSE PRACTITIONER

## 2020-06-23 PROCEDURE — 73562 X-RAY EXAM OF KNEE 3: CPT

## 2020-06-24 ENCOUNTER — TELEPHONE (OUTPATIENT)
Dept: INTERNAL MEDICINE | Facility: CLINIC | Age: 75
End: 2020-06-24

## 2020-06-24 DIAGNOSIS — M25.561 POSTERIOR KNEE PAIN, RIGHT: Primary | ICD-10-CM

## 2020-06-29 ENCOUNTER — TELEPHONE (OUTPATIENT)
Dept: NEUROSURGERY | Facility: CLINIC | Age: 75
End: 2020-06-29

## 2020-06-30 ENCOUNTER — TELEPHONE (OUTPATIENT)
Dept: PHARMACY | Facility: HOSPITAL | Age: 75
End: 2020-06-30

## 2020-07-02 ENCOUNTER — TELEPHONE (OUTPATIENT)
Dept: NEUROSURGERY | Facility: CLINIC | Age: 75
End: 2020-07-02

## 2020-07-15 ENCOUNTER — TELEPHONE (OUTPATIENT)
Dept: PHARMACY | Facility: HOSPITAL | Age: 75
End: 2020-07-15

## 2020-07-16 ENCOUNTER — TELEPHONE (OUTPATIENT)
Dept: NEUROSURGERY | Facility: CLINIC | Age: 75
End: 2020-07-16

## 2020-07-20 ENCOUNTER — TELEPHONE (OUTPATIENT)
Dept: NEUROLOGY | Facility: CLINIC | Age: 75
End: 2020-07-20

## 2020-07-20 ENCOUNTER — TELEPHONE (OUTPATIENT)
Dept: NEUROSURGERY | Facility: CLINIC | Age: 75
End: 2020-07-20

## 2020-07-22 ENCOUNTER — TELEPHONE (OUTPATIENT)
Dept: INTERNAL MEDICINE | Facility: CLINIC | Age: 75
End: 2020-07-22

## 2020-07-22 ENCOUNTER — TELEPHONE (OUTPATIENT)
Dept: CARDIOLOGY | Facility: CLINIC | Age: 75
End: 2020-07-22

## 2020-07-24 ENCOUNTER — TELEPHONE (OUTPATIENT)
Dept: INTERNAL MEDICINE | Facility: CLINIC | Age: 75
End: 2020-07-24

## 2020-07-27 ENCOUNTER — ANTICOAGULATION VISIT (OUTPATIENT)
Dept: PHARMACY | Facility: HOSPITAL | Age: 75
End: 2020-07-27

## 2020-07-27 DIAGNOSIS — I48.0 PAROXYSMAL ATRIAL FIBRILLATION (HCC): ICD-10-CM

## 2020-07-27 LAB — INR PPP: 1.5

## 2020-07-30 ENCOUNTER — OFFICE VISIT (OUTPATIENT)
Dept: NEUROLOGY | Facility: CLINIC | Age: 75
End: 2020-07-30

## 2020-07-30 VITALS
HEIGHT: 70 IN | WEIGHT: 230 LBS | OXYGEN SATURATION: 99 % | BODY MASS INDEX: 32.93 KG/M2 | DIASTOLIC BLOOD PRESSURE: 72 MMHG | HEART RATE: 69 BPM | SYSTOLIC BLOOD PRESSURE: 120 MMHG

## 2020-07-30 DIAGNOSIS — G45.9 TIA (TRANSIENT ISCHEMIC ATTACK): Primary | ICD-10-CM

## 2020-07-30 DIAGNOSIS — R27.0 ATAXIA: ICD-10-CM

## 2020-07-30 DIAGNOSIS — R56.9 SEIZURE (HCC): ICD-10-CM
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
## 2020-07-30 DIAGNOSIS — E11.40 CONTROLLED TYPE 2 DIABETES MELLITUS WITH DIABETIC NEUROPATHY, WITHOUT LONG-TERM CURRENT USE OF INSULIN (HCC): ICD-10-CM

## 2020-07-30 PROCEDURE — 99214 OFFICE O/P EST MOD 30 MIN: CPT | Performed by: NURSE PRACTITIONER

## 2020-07-30 RX ORDER — LEVETIRACETAM 500 MG/1
500 TABLET ORAL 2 TIMES DAILY
Qty: 60 TABLET | Refills: 5 | Status: SHIPPED | OUTPATIENT
Start: 2020-07-30 | End: 2021-02-01

## 2020-07-30 RX ORDER — LEVETIRACETAM 500 MG/1
500 TABLET ORAL
COMMUNITY
Start: 2020-07-02 | End: 2020-07-30 | Stop reason: SDUPTHER

## 2020-07-31 ENCOUNTER — ANTICOAGULATION VISIT (OUTPATIENT)
Dept: PHARMACY | Facility: HOSPITAL | Age: 75
End: 2020-07-31

## 2020-07-31 DIAGNOSIS — I48.0 PAROXYSMAL ATRIAL FIBRILLATION (HCC): ICD-10-CM

## 2020-07-31 LAB — INR PPP: 2.6

## 2020-08-05 ENCOUNTER — RESULTS ENCOUNTER (OUTPATIENT)
Dept: INTERNAL MEDICINE | Facility: CLINIC | Age: 75
End: 2020-08-05

## 2020-08-05 DIAGNOSIS — E11.40 CONTROLLED TYPE 2 DIABETES MELLITUS WITH DIABETIC NEUROPATHY, WITHOUT LONG-TERM CURRENT USE OF INSULIN (HCC): ICD-10-CM

## 2020-08-05 DIAGNOSIS — I10 HYPERTENSION, UNSPECIFIED TYPE: ICD-10-CM

## 2020-08-06 ENCOUNTER — ANTICOAGULATION VISIT (OUTPATIENT)
Dept: PHARMACY | Facility: HOSPITAL | Age: 75
End: 2020-08-06

## 2020-08-06 DIAGNOSIS — I48.0 PAROXYSMAL ATRIAL FIBRILLATION (HCC): ICD-10-CM

## 2020-08-06 LAB
ALBUMIN SERPL-MCNC: 4.2 G/DL (ref 3.5–5.2)
ALBUMIN/GLOB SERPL: 1.8 G/DL
ALP SERPL-CCNC: 88 U/L (ref 39–117)
ALT SERPL-CCNC: 18 U/L (ref 1–41)
AST SERPL-CCNC: 12 U/L (ref 1–40)
BILIRUB SERPL-MCNC: 0.4 MG/DL (ref 0–1.2)
BUN SERPL-MCNC: 15 MG/DL (ref 8–23)
BUN/CREAT SERPL: 11.4 (ref 7–25)
CALCIUM SERPL-MCNC: 9.5 MG/DL (ref 8.6–10.5)
CHLORIDE SERPL-SCNC: 100 MMOL/L (ref 98–107)
CO2 SERPL-SCNC: 32.3 MMOL/L (ref 22–29)
CREAT SERPL-MCNC: 1.32 MG/DL (ref 0.76–1.27)
GLOBULIN SER CALC-MCNC: 2.3 GM/DL
GLUCOSE SERPL-MCNC: 120 MG/DL (ref 65–99)
HBA1C MFR BLD: 6.4 % (ref 4.8–5.6)
INR PPP: 3.7
MAGNESIUM SERPL-MCNC: 1.6 MG/DL (ref 1.6–2.4)
POTASSIUM SERPL-SCNC: 4.5 MMOL/L (ref 3.5–5.2)
PROT SERPL-MCNC: 6.5 G/DL (ref 6–8.5)
SODIUM SERPL-SCNC: 143 MMOL/L (ref 136–145)

## 2020-08-12 ENCOUNTER — OFFICE VISIT (OUTPATIENT)
Dept: INTERNAL MEDICINE | Facility: CLINIC | Age: 75
End: 2020-08-12

## 2020-08-12 VITALS
HEIGHT: 70 IN | TEMPERATURE: 98 F | OXYGEN SATURATION: 98 % | DIASTOLIC BLOOD PRESSURE: 62 MMHG | BODY MASS INDEX: 30.14 KG/M2 | SYSTOLIC BLOOD PRESSURE: 106 MMHG | WEIGHT: 210.5 LBS | HEART RATE: 60 BPM | RESPIRATION RATE: 18 BRPM

## 2020-08-12 DIAGNOSIS — E11.43 TYPE 2 DIABETES MELLITUS WITH AUTONOMIC NEUROPATHY, UNSPECIFIED WHETHER LONG TERM INSULIN USE (HCC): ICD-10-CM

## 2020-08-12 DIAGNOSIS — E11.40 CONTROLLED TYPE 2 DIABETES MELLITUS WITH DIABETIC NEUROPATHY, WITHOUT LONG-TERM CURRENT USE OF INSULIN (HCC): ICD-10-CM

## 2020-08-12 DIAGNOSIS — I10 ESSENTIAL HYPERTENSION: Primary | ICD-10-CM

## 2020-08-12 PROCEDURE — 99214 OFFICE O/P EST MOD 30 MIN: CPT | Performed by: INTERNAL MEDICINE

## 2020-08-12 RX ORDER — CALCIUM CARBONATE/VITAMIN D3 500-10/5ML
LIQUID (ML) ORAL
COMMUNITY

## 2020-08-13 ENCOUNTER — ANTICOAGULATION VISIT (OUTPATIENT)
Dept: PHARMACY | Facility: HOSPITAL | Age: 75
End: 2020-08-13

## 2020-08-13 DIAGNOSIS — I48.0 PAROXYSMAL ATRIAL FIBRILLATION (HCC): ICD-10-CM

## 2020-08-13 LAB — INR PPP: 4.7

## 2020-08-18 ENCOUNTER — ANTICOAGULATION VISIT (OUTPATIENT)
Dept: PHARMACY | Facility: HOSPITAL | Age: 75
End: 2020-08-18

## 2020-08-18 DIAGNOSIS — I48.0 PAROXYSMAL ATRIAL FIBRILLATION (HCC): ICD-10-CM

## 2020-08-18 LAB
INR PPP: 3 (ref 0.91–1.09)
PROTHROMBIN TIME: 36.2 SECONDS (ref 10–13.8)

## 2020-08-18 PROCEDURE — 36416 COLLJ CAPILLARY BLOOD SPEC: CPT

## 2020-08-18 PROCEDURE — 85610 PROTHROMBIN TIME: CPT

## 2020-08-19 ENCOUNTER — OFFICE VISIT (OUTPATIENT)
Dept: NEUROSURGERY | Facility: CLINIC | Age: 75
End: 2020-08-19

## 2020-08-19 DIAGNOSIS — G91.8 HYDROCEPHALUS EX VACUO (HCC): ICD-10-CM

## 2020-08-19 DIAGNOSIS — R26.9 ABNORMALITY OF GAIT: Primary | ICD-10-CM

## 2020-08-19 PROCEDURE — 99441 PR PHYS/QHP TELEPHONE EVALUATION 5-10 MIN: CPT | Performed by: NEUROLOGICAL SURGERY

## 2020-09-01 ENCOUNTER — ANTICOAGULATION VISIT (OUTPATIENT)
Dept: PHARMACY | Facility: HOSPITAL | Age: 75
End: 2020-09-01

## 2020-09-01 DIAGNOSIS — I48.0 PAROXYSMAL ATRIAL FIBRILLATION (HCC): ICD-10-CM

## 2020-09-01 LAB
INR PPP: 1.9 (ref 0.91–1.09)
PROTHROMBIN TIME: 22.6 SECONDS (ref 10–13.8)

## 2020-09-01 PROCEDURE — G0463 HOSPITAL OUTPT CLINIC VISIT: HCPCS

## 2020-09-01 PROCEDURE — 85610 PROTHROMBIN TIME: CPT

## 2020-09-01 PROCEDURE — 36416 COLLJ CAPILLARY BLOOD SPEC: CPT

## 2020-09-02 ENCOUNTER — OFFICE VISIT (OUTPATIENT)
Dept: NEUROSURGERY | Facility: CLINIC | Age: 75
End: 2020-09-02

## 2020-09-02 VITALS
TEMPERATURE: 99.2 F | HEIGHT: 70 IN | HEART RATE: 74 BPM | BODY MASS INDEX: 30.2 KG/M2 | DIASTOLIC BLOOD PRESSURE: 78 MMHG | SYSTOLIC BLOOD PRESSURE: 125 MMHG

## 2020-09-02 DIAGNOSIS — R26.9 ABNORMALITY OF GAIT: Primary | ICD-10-CM

## 2020-09-02 DIAGNOSIS — G91.8 HYDROCEPHALUS EX VACUO (HCC): ICD-10-CM

## 2020-09-02 PROCEDURE — 99213 OFFICE O/P EST LOW 20 MIN: CPT | Performed by: NEUROLOGICAL SURGERY

## 2020-09-14 RX ORDER — WARFARIN SODIUM 5 MG/1
TABLET ORAL
Qty: 90 TABLET | Refills: 1 | OUTPATIENT
Start: 2020-09-14 | End: 2020-12-14

## 2020-09-15 ENCOUNTER — ANTICOAGULATION VISIT (OUTPATIENT)
Dept: PHARMACY | Facility: HOSPITAL | Age: 75
End: 2020-09-15

## 2020-09-15 DIAGNOSIS — I48.0 PAROXYSMAL ATRIAL FIBRILLATION (HCC): ICD-10-CM

## 2020-09-15 LAB
INR PPP: 1.8 (ref 0.91–1.09)
PROTHROMBIN TIME: 21.9 SECONDS (ref 10–13.8)

## 2020-09-15 PROCEDURE — 85610 PROTHROMBIN TIME: CPT

## 2020-09-15 PROCEDURE — G0463 HOSPITAL OUTPT CLINIC VISIT: HCPCS

## 2020-09-15 PROCEDURE — 36416 COLLJ CAPILLARY BLOOD SPEC: CPT

## 2020-09-18 ENCOUNTER — TELEMEDICINE (OUTPATIENT)
Dept: INTERNAL MEDICINE | Facility: CLINIC | Age: 75
End: 2020-09-18

## 2020-09-18 VITALS — DIASTOLIC BLOOD PRESSURE: 66 MMHG | HEART RATE: 51 BPM | SYSTOLIC BLOOD PRESSURE: 133 MMHG

## 2020-09-18 DIAGNOSIS — E11.40 CONTROLLED TYPE 2 DIABETES MELLITUS WITH DIABETIC NEUROPATHY, WITHOUT LONG-TERM CURRENT USE OF INSULIN (HCC): Primary | ICD-10-CM

## 2020-09-18 DIAGNOSIS — K59.00 CONSTIPATION, UNSPECIFIED CONSTIPATION TYPE: ICD-10-CM

## 2020-09-18 PROCEDURE — 99213 OFFICE O/P EST LOW 20 MIN: CPT | Performed by: INTERNAL MEDICINE

## 2020-10-06 RX ORDER — PANTOPRAZOLE SODIUM 40 MG/1
TABLET, DELAYED RELEASE ORAL
Qty: 90 TABLET | Refills: 4 | OUTPATIENT
Start: 2020-10-06

## 2020-10-19 RX ORDER — PANTOPRAZOLE SODIUM 40 MG/1
40 TABLET, DELAYED RELEASE ORAL DAILY
Qty: 90 TABLET | Refills: 3 | Status: SHIPPED | OUTPATIENT
Start: 2020-10-19 | End: 2021-11-01

## 2020-10-28 ENCOUNTER — TELEPHONE (OUTPATIENT)
Dept: PHARMACY | Facility: HOSPITAL | Age: 75
End: 2020-10-28

## 2020-11-03 ENCOUNTER — ANTICOAGULATION VISIT (OUTPATIENT)
Dept: PHARMACY | Facility: HOSPITAL | Age: 75
End: 2020-11-03

## 2020-11-03 DIAGNOSIS — I48.0 PAROXYSMAL ATRIAL FIBRILLATION (HCC): ICD-10-CM

## 2020-11-03 LAB
INR PPP: 2.5 (ref 0.91–1.09)
PROTHROMBIN TIME: 30.2 SECONDS (ref 10–13.8)

## 2020-11-03 PROCEDURE — 85610 PROTHROMBIN TIME: CPT

## 2020-11-03 PROCEDURE — 36416 COLLJ CAPILLARY BLOOD SPEC: CPT

## 2020-11-06 DIAGNOSIS — E11.43 TYPE 2 DIABETES MELLITUS WITH AUTONOMIC NEUROPATHY, UNSPECIFIED WHETHER LONG TERM INSULIN USE (HCC): ICD-10-CM

## 2020-11-06 DIAGNOSIS — E11.40 CONTROLLED TYPE 2 DIABETES MELLITUS WITH DIABETIC NEUROPATHY, WITHOUT LONG-TERM CURRENT USE OF INSULIN (HCC): ICD-10-CM

## 2020-11-06 DIAGNOSIS — I10 ESSENTIAL HYPERTENSION: ICD-10-CM

## 2020-11-10 LAB
ALBUMIN SERPL-MCNC: 4.2 G/DL (ref 3.5–5.2)
ALBUMIN/GLOB SERPL: 1.8 G/DL
ALP SERPL-CCNC: 107 U/L (ref 39–117)
ALT SERPL-CCNC: 17 U/L (ref 1–41)
AST SERPL-CCNC: 12 U/L (ref 1–40)
BASOPHILS # BLD AUTO: 0.05 10*3/MM3 (ref 0–0.2)
BASOPHILS NFR BLD AUTO: 0.5 % (ref 0–1.5)
BILIRUB SERPL-MCNC: 0.4 MG/DL (ref 0–1.2)
BUN SERPL-MCNC: 19 MG/DL (ref 8–23)
BUN/CREAT SERPL: 15.4 (ref 7–25)
CALCIUM SERPL-MCNC: 9.5 MG/DL (ref 8.6–10.5)
CHLORIDE SERPL-SCNC: 102 MMOL/L (ref 98–107)
CHOLEST SERPL-MCNC: 128 MG/DL (ref 0–200)
CO2 SERPL-SCNC: 30.3 MMOL/L (ref 22–29)
CREAT SERPL-MCNC: 1.23 MG/DL (ref 0.76–1.27)
EOSINOPHIL # BLD AUTO: 0.28 10*3/MM3 (ref 0–0.4)
EOSINOPHIL NFR BLD AUTO: 3.1 % (ref 0.3–6.2)
ERYTHROCYTE [DISTWIDTH] IN BLOOD BY AUTOMATED COUNT: 12.7 % (ref 12.3–15.4)
GLOBULIN SER CALC-MCNC: 2.4 GM/DL
GLUCOSE SERPL-MCNC: 102 MG/DL (ref 65–99)
HBA1C MFR BLD: 5.8 % (ref 4.8–5.6)
HCT VFR BLD AUTO: 40.9 % (ref 37.5–51)
HDLC SERPL-MCNC: 44 MG/DL (ref 40–60)
HGB BLD-MCNC: 13.7 G/DL (ref 13–17.7)
IMM GRANULOCYTES # BLD AUTO: 0.05 10*3/MM3 (ref 0–0.05)
IMM GRANULOCYTES NFR BLD AUTO: 0.5 % (ref 0–0.5)
LDLC SERPL CALC-MCNC: 56 MG/DL (ref 0–100)
LDLC/HDLC SERPL: 1.15 {RATIO}
LYMPHOCYTES # BLD AUTO: 2.35 10*3/MM3 (ref 0.7–3.1)
LYMPHOCYTES NFR BLD AUTO: 25.8 % (ref 19.6–45.3)
MCH RBC QN AUTO: 28.4 PG (ref 26.6–33)
MCHC RBC AUTO-ENTMCNC: 33.5 G/DL (ref 31.5–35.7)
MCV RBC AUTO: 84.9 FL (ref 79–97)
MONOCYTES # BLD AUTO: 0.69 10*3/MM3 (ref 0.1–0.9)
MONOCYTES NFR BLD AUTO: 7.6 % (ref 5–12)
NEUTROPHILS # BLD AUTO: 5.68 10*3/MM3 (ref 1.7–7)
NEUTROPHILS NFR BLD AUTO: 62.5 % (ref 42.7–76)
NRBC BLD AUTO-RTO: 0 /100 WBC (ref 0–0.2)
PLATELET # BLD AUTO: 229 10*3/MM3 (ref 140–450)
POTASSIUM SERPL-SCNC: 4.9 MMOL/L (ref 3.5–5.2)
PROT SERPL-MCNC: 6.6 G/DL (ref 6–8.5)
RBC # BLD AUTO: 4.82 10*6/MM3 (ref 4.14–5.8)
SODIUM SERPL-SCNC: 140 MMOL/L (ref 136–145)
TRIGL SERPL-MCNC: 166 MG/DL (ref 0–150)
VLDLC SERPL CALC-MCNC: 28 MG/DL (ref 5–40)
WBC # BLD AUTO: 9.1 10*3/MM3 (ref 3.4–10.8)

## 2020-11-12 RX ORDER — METFORMIN HYDROCHLORIDE 500 MG/1
TABLET, EXTENDED RELEASE ORAL
Qty: 180 TABLET | Refills: 1 | Status: SHIPPED | OUTPATIENT
Start: 2020-11-12 | End: 2021-09-10

## 2020-11-16 ENCOUNTER — OFFICE VISIT (OUTPATIENT)
Dept: INTERNAL MEDICINE | Facility: CLINIC | Age: 75
End: 2020-11-16

## 2020-11-16 VITALS
OXYGEN SATURATION: 98 % | WEIGHT: 226.7 LBS | DIASTOLIC BLOOD PRESSURE: 68 MMHG | BODY MASS INDEX: 32.45 KG/M2 | HEART RATE: 51 BPM | SYSTOLIC BLOOD PRESSURE: 122 MMHG | HEIGHT: 70 IN

## 2020-11-16 DIAGNOSIS — E11.40 CONTROLLED TYPE 2 DIABETES MELLITUS WITH DIABETIC NEUROPATHY, WITHOUT LONG-TERM CURRENT USE OF INSULIN (HCC): ICD-10-CM

## 2020-11-16 DIAGNOSIS — Z23 NEED FOR INFLUENZA VACCINATION: ICD-10-CM

## 2020-11-16 DIAGNOSIS — Z12.11 COLON CANCER SCREENING: ICD-10-CM

## 2020-11-16 DIAGNOSIS — I10 ESSENTIAL HYPERTENSION: ICD-10-CM

## 2020-11-16 DIAGNOSIS — E55.9 VITAMIN D DEFICIENCY: ICD-10-CM

## 2020-11-16 DIAGNOSIS — E78.2 MIXED HYPERLIPIDEMIA: Primary | ICD-10-CM

## 2020-11-16 DIAGNOSIS — K21.00 GASTROESOPHAGEAL REFLUX DISEASE WITH ESOPHAGITIS, UNSPECIFIED WHETHER HEMORRHAGE: ICD-10-CM

## 2020-11-16 PROCEDURE — G0008 ADMIN INFLUENZA VIRUS VAC: HCPCS | Performed by: INTERNAL MEDICINE

## 2020-11-16 PROCEDURE — 90694 VACC AIIV4 NO PRSRV 0.5ML IM: CPT | Performed by: INTERNAL MEDICINE

## 2020-11-16 PROCEDURE — 99214 OFFICE O/P EST MOD 30 MIN: CPT | Performed by: INTERNAL MEDICINE

## 2020-12-01 ENCOUNTER — ANTICOAGULATION VISIT (OUTPATIENT)
Dept: PHARMACY | Facility: HOSPITAL | Age: 75
End: 2020-12-01

## 2020-12-01 DIAGNOSIS — I48.0 PAROXYSMAL ATRIAL FIBRILLATION (HCC): ICD-10-CM

## 2020-12-01 LAB
INR PPP: 2.8 (ref 0.91–1.09)
PROTHROMBIN TIME: 33.9 SECONDS (ref 10–13.8)

## 2020-12-01 PROCEDURE — 85610 PROTHROMBIN TIME: CPT

## 2020-12-01 PROCEDURE — 36416 COLLJ CAPILLARY BLOOD SPEC: CPT

## 2020-12-11 ENCOUNTER — TELEPHONE (OUTPATIENT)
Dept: GASTROENTEROLOGY | Facility: CLINIC | Age: 75
End: 2020-12-11

## 2020-12-14 RX ORDER — WARFARIN SODIUM 5 MG/1
TABLET ORAL
Qty: 80 TABLET | Refills: 1 | Status: SHIPPED | OUTPATIENT
Start: 2020-12-14 | End: 2020-12-14 | Stop reason: SDUPTHER

## 2020-12-14 RX ORDER — WARFARIN SODIUM 5 MG/1
TABLET ORAL
Qty: 80 TABLET | Refills: 1 | Status: SHIPPED | OUTPATIENT
Start: 2020-12-14 | End: 2021-06-28

## 2020-12-14 RX ORDER — ATORVASTATIN CALCIUM 40 MG/1
TABLET, FILM COATED ORAL
Qty: 90 TABLET | Refills: 1 | Status: SHIPPED | OUTPATIENT
Start: 2020-12-14 | End: 2021-04-30 | Stop reason: SDUPTHER

## 2020-12-28 RX ORDER — METOPROLOL SUCCINATE 25 MG/1
12.5 TABLET, EXTENDED RELEASE ORAL DAILY
Qty: 45 TABLET | Refills: 3 | Status: SHIPPED | OUTPATIENT
Start: 2020-12-28 | End: 2021-12-13

## 2021-01-21 ENCOUNTER — TELEPHONE (OUTPATIENT)
Dept: PHARMACY | Facility: HOSPITAL | Age: 76
End: 2021-01-21

## 2021-01-28 ENCOUNTER — TELEPHONE (OUTPATIENT)
Dept: PHARMACY | Facility: HOSPITAL | Age: 76
End: 2021-01-28

## 2021-02-01 RX ORDER — LEVETIRACETAM 500 MG/1
TABLET ORAL
Qty: 60 TABLET | Refills: 4 | Status: SHIPPED | OUTPATIENT
Start: 2021-02-01 | End: 2021-06-21

## 2021-02-01 RX ORDER — LEVOTHYROXINE SODIUM 0.05 MG/1
TABLET ORAL
Qty: 90 TABLET | Refills: 0 | Status: SHIPPED | OUTPATIENT
Start: 2021-02-01 | End: 2021-04-29

## 2021-02-02 ENCOUNTER — TELEMEDICINE (OUTPATIENT)
Dept: NEUROLOGY | Facility: CLINIC | Age: 76
End: 2021-02-02

## 2021-02-02 DIAGNOSIS — E78.2 MIXED HYPERLIPIDEMIA: ICD-10-CM

## 2021-02-02 DIAGNOSIS — I10 ESSENTIAL HYPERTENSION: ICD-10-CM

## 2021-02-02 DIAGNOSIS — I67.9 CEREBROVASCULAR DISEASE: ICD-10-CM

## 2021-02-02 DIAGNOSIS — R26.81 UNSTEADY GAIT: ICD-10-CM

## 2021-02-02 DIAGNOSIS — R41.89 COGNITIVE IMPAIRMENT: ICD-10-CM

## 2021-02-02 DIAGNOSIS — R56.9 SEIZURE (HCC): ICD-10-CM

## 2021-02-02 PROCEDURE — 99214 OFFICE O/P EST MOD 30 MIN: CPT | Performed by: NURSE PRACTITIONER

## 2021-02-22 RX ORDER — POTASSIUM CHLORIDE 750 MG/1
TABLET, FILM COATED, EXTENDED RELEASE ORAL
Qty: 90 TABLET | Refills: 1 | Status: SHIPPED | OUTPATIENT
Start: 2021-02-22 | End: 2021-08-30

## 2021-02-26 ENCOUNTER — ANTICOAGULATION VISIT (OUTPATIENT)
Dept: PHARMACY | Facility: HOSPITAL | Age: 76
End: 2021-02-26

## 2021-02-26 DIAGNOSIS — I48.0 PAROXYSMAL ATRIAL FIBRILLATION (HCC): ICD-10-CM

## 2021-02-26 LAB
INR PPP: 3.6 (ref 0.91–1.09)
PROTHROMBIN TIME: 43.8 SECONDS (ref 10–13.8)

## 2021-02-26 PROCEDURE — 85610 PROTHROMBIN TIME: CPT

## 2021-02-26 PROCEDURE — G0463 HOSPITAL OUTPT CLINIC VISIT: HCPCS

## 2021-02-26 PROCEDURE — 36416 COLLJ CAPILLARY BLOOD SPEC: CPT

## 2021-03-09 DIAGNOSIS — Z23 IMMUNIZATION DUE: ICD-10-CM

## 2021-03-12 ENCOUNTER — ANTICOAGULATION VISIT (OUTPATIENT)
Dept: PHARMACY | Facility: HOSPITAL | Age: 76
End: 2021-03-12

## 2021-03-12 DIAGNOSIS — I48.0 PAROXYSMAL ATRIAL FIBRILLATION (HCC): ICD-10-CM

## 2021-03-12 LAB
INR PPP: 3.9 (ref 0.91–1.09)
PROTHROMBIN TIME: 46.2 SECONDS (ref 10–13.8)

## 2021-03-12 PROCEDURE — 36416 COLLJ CAPILLARY BLOOD SPEC: CPT

## 2021-03-12 PROCEDURE — 85610 PROTHROMBIN TIME: CPT

## 2021-03-12 PROCEDURE — G0463 HOSPITAL OUTPT CLINIC VISIT: HCPCS

## 2021-03-26 ENCOUNTER — ANTICOAGULATION VISIT (OUTPATIENT)
Dept: PHARMACY | Facility: HOSPITAL | Age: 76
End: 2021-03-26

## 2021-03-26 DIAGNOSIS — I48.0 PAROXYSMAL ATRIAL FIBRILLATION (HCC): ICD-10-CM

## 2021-03-26 LAB
INR PPP: 3 (ref 0.91–1.09)
PROTHROMBIN TIME: 36.1 SECONDS (ref 10–13.8)

## 2021-03-26 PROCEDURE — 85610 PROTHROMBIN TIME: CPT

## 2021-03-26 PROCEDURE — 36416 COLLJ CAPILLARY BLOOD SPEC: CPT

## 2021-04-01 ENCOUNTER — TELEPHONE (OUTPATIENT)
Dept: NEUROLOGY | Facility: CLINIC | Age: 76
End: 2021-04-01

## 2021-04-09 RX ORDER — DONEPEZIL HYDROCHLORIDE 5 MG/1
5 TABLET, FILM COATED ORAL NIGHTLY
Qty: 60 TABLET | Refills: 2 | Status: SHIPPED | OUTPATIENT
Start: 2021-04-09 | End: 2021-07-20

## 2021-04-12 RX ORDER — LISINOPRIL AND HYDROCHLOROTHIAZIDE 20; 12.5 MG/1; MG/1
TABLET ORAL
Qty: 90 TABLET | Refills: 1 | Status: SHIPPED | OUTPATIENT
Start: 2021-04-12 | End: 2022-04-20

## 2021-04-27 ENCOUNTER — TELEPHONE (OUTPATIENT)
Dept: PHARMACY | Facility: HOSPITAL | Age: 76
End: 2021-04-27

## 2021-04-29 RX ORDER — TRAZODONE HYDROCHLORIDE 50 MG/1
TABLET ORAL
Qty: 90 TABLET | Refills: 1 | Status: SHIPPED | OUTPATIENT
Start: 2021-04-29 | End: 2021-05-18

## 2021-04-29 RX ORDER — LEVOTHYROXINE SODIUM 0.05 MG/1
TABLET ORAL
Qty: 90 TABLET | Refills: 1 | Status: SHIPPED | OUTPATIENT
Start: 2021-04-29 | End: 2021-10-18

## 2021-04-30 ENCOUNTER — ANTICOAGULATION VISIT (OUTPATIENT)
Dept: PHARMACY | Facility: HOSPITAL | Age: 76
End: 2021-04-30

## 2021-04-30 DIAGNOSIS — I48.0 PAROXYSMAL ATRIAL FIBRILLATION (HCC): Primary | ICD-10-CM

## 2021-04-30 LAB
INR PPP: 1.9 (ref 0.91–1.09)
PROTHROMBIN TIME: 22.6 SECONDS (ref 10–13.8)

## 2021-04-30 PROCEDURE — 36416 COLLJ CAPILLARY BLOOD SPEC: CPT

## 2021-04-30 PROCEDURE — 85610 PROTHROMBIN TIME: CPT

## 2021-04-30 RX ORDER — ATORVASTATIN CALCIUM 40 MG/1
40 TABLET, FILM COATED ORAL NIGHTLY
Qty: 90 TABLET | Refills: 1 | Status: SHIPPED | OUTPATIENT
Start: 2021-04-30 | End: 2022-01-03

## 2021-05-10 DIAGNOSIS — E78.2 MIXED HYPERLIPIDEMIA: ICD-10-CM

## 2021-05-10 DIAGNOSIS — I10 ESSENTIAL HYPERTENSION: ICD-10-CM

## 2021-05-10 DIAGNOSIS — K21.00 GASTROESOPHAGEAL REFLUX DISEASE WITH ESOPHAGITIS, UNSPECIFIED WHETHER HEMORRHAGE: ICD-10-CM

## 2021-05-10 DIAGNOSIS — E55.9 VITAMIN D DEFICIENCY: ICD-10-CM

## 2021-05-10 DIAGNOSIS — E11.40 CONTROLLED TYPE 2 DIABETES MELLITUS WITH DIABETIC NEUROPATHY, WITHOUT LONG-TERM CURRENT USE OF INSULIN (HCC): ICD-10-CM

## 2021-05-12 LAB
25(OH)D3+25(OH)D2 SERPL-MCNC: 52.3 NG/ML (ref 30–100)
ALBUMIN SERPL-MCNC: 4 G/DL (ref 3.5–5.2)
ALBUMIN/GLOB SERPL: 1.6 G/DL
ALP SERPL-CCNC: 107 U/L (ref 39–117)
ALT SERPL-CCNC: 16 U/L (ref 1–41)
AST SERPL-CCNC: 14 U/L (ref 1–40)
BASOPHILS # BLD AUTO: 0.04 10*3/MM3 (ref 0–0.2)
BASOPHILS NFR BLD AUTO: 0.5 % (ref 0–1.5)
BILIRUB SERPL-MCNC: 0.3 MG/DL (ref 0–1.2)
BUN SERPL-MCNC: 13 MG/DL (ref 8–23)
BUN/CREAT SERPL: 11.6 (ref 7–25)
CALCIUM SERPL-MCNC: 9.2 MG/DL (ref 8.6–10.5)
CHLORIDE SERPL-SCNC: 106 MMOL/L (ref 98–107)
CHOLEST SERPL-MCNC: 116 MG/DL (ref 0–200)
CO2 SERPL-SCNC: 31.9 MMOL/L (ref 22–29)
CREAT SERPL-MCNC: 1.12 MG/DL (ref 0.76–1.27)
EOSINOPHIL # BLD AUTO: 0.32 10*3/MM3 (ref 0–0.4)
EOSINOPHIL NFR BLD AUTO: 3.6 % (ref 0.3–6.2)
ERYTHROCYTE [DISTWIDTH] IN BLOOD BY AUTOMATED COUNT: 13 % (ref 12.3–15.4)
GLOBULIN SER CALC-MCNC: 2.5 GM/DL
GLUCOSE SERPL-MCNC: 135 MG/DL (ref 65–99)
HCT VFR BLD AUTO: 39.8 % (ref 37.5–51)
HDLC SERPL-MCNC: 39 MG/DL (ref 40–60)
HGB BLD-MCNC: 13.4 G/DL (ref 13–17.7)
IMM GRANULOCYTES # BLD AUTO: 0.06 10*3/MM3 (ref 0–0.05)
IMM GRANULOCYTES NFR BLD AUTO: 0.7 % (ref 0–0.5)
LDLC SERPL CALC-MCNC: 52 MG/DL (ref 0–100)
LDLC/HDLC SERPL: 1.25 {RATIO}
LYMPHOCYTES # BLD AUTO: 2.44 10*3/MM3 (ref 0.7–3.1)
LYMPHOCYTES NFR BLD AUTO: 27.8 % (ref 19.6–45.3)
MCH RBC QN AUTO: 28.1 PG (ref 26.6–33)
MCHC RBC AUTO-ENTMCNC: 33.7 G/DL (ref 31.5–35.7)
MCV RBC AUTO: 83.4 FL (ref 79–97)
MONOCYTES # BLD AUTO: 0.63 10*3/MM3 (ref 0.1–0.9)
MONOCYTES NFR BLD AUTO: 7.2 % (ref 5–12)
NEUTROPHILS # BLD AUTO: 5.29 10*3/MM3 (ref 1.7–7)
NEUTROPHILS NFR BLD AUTO: 60.2 % (ref 42.7–76)
NRBC BLD AUTO-RTO: 0.1 /100 WBC (ref 0–0.2)
PLATELET # BLD AUTO: 206 10*3/MM3 (ref 140–450)
POTASSIUM SERPL-SCNC: 3.9 MMOL/L (ref 3.5–5.2)
PROT SERPL-MCNC: 6.5 G/DL (ref 6–8.5)
RBC # BLD AUTO: 4.77 10*6/MM3 (ref 4.14–5.8)
SODIUM SERPL-SCNC: 145 MMOL/L (ref 136–145)
TRIGL SERPL-MCNC: 141 MG/DL (ref 0–150)
TSH SERPL DL<=0.005 MIU/L-ACNC: 1.92 UIU/ML (ref 0.27–4.2)
VLDLC SERPL CALC-MCNC: 25 MG/DL (ref 5–40)
WBC # BLD AUTO: 8.78 10*3/MM3 (ref 3.4–10.8)

## 2021-05-14 ENCOUNTER — ANTICOAGULATION VISIT (OUTPATIENT)
Dept: PHARMACY | Facility: HOSPITAL | Age: 76
End: 2021-05-14

## 2021-05-14 DIAGNOSIS — I48.0 PAROXYSMAL ATRIAL FIBRILLATION (HCC): Primary | ICD-10-CM

## 2021-05-14 LAB
INR PPP: 3.2 (ref 0.91–1.09)
PROTHROMBIN TIME: 38.5 SECONDS (ref 10–13.8)

## 2021-05-14 PROCEDURE — 85610 PROTHROMBIN TIME: CPT

## 2021-05-14 PROCEDURE — G0463 HOSPITAL OUTPT CLINIC VISIT: HCPCS

## 2021-05-14 PROCEDURE — 36416 COLLJ CAPILLARY BLOOD SPEC: CPT

## 2021-05-18 ENCOUNTER — OFFICE VISIT (OUTPATIENT)
Dept: INTERNAL MEDICINE | Facility: CLINIC | Age: 76
End: 2021-05-18

## 2021-05-18 VITALS
DIASTOLIC BLOOD PRESSURE: 66 MMHG | HEIGHT: 70 IN | HEART RATE: 50 BPM | SYSTOLIC BLOOD PRESSURE: 118 MMHG | WEIGHT: 239 LBS | OXYGEN SATURATION: 98 % | BODY MASS INDEX: 34.22 KG/M2 | TEMPERATURE: 97.7 F

## 2021-05-18 DIAGNOSIS — E11.40 CONTROLLED TYPE 2 DIABETES MELLITUS WITH DIABETIC NEUROPATHY, WITHOUT LONG-TERM CURRENT USE OF INSULIN (HCC): ICD-10-CM

## 2021-05-18 DIAGNOSIS — I10 ESSENTIAL HYPERTENSION: ICD-10-CM

## 2021-05-18 DIAGNOSIS — Z00.00 MEDICARE ANNUAL WELLNESS VISIT, SUBSEQUENT: Primary | ICD-10-CM

## 2021-05-18 DIAGNOSIS — I48.0 PAROXYSMAL ATRIAL FIBRILLATION (HCC): ICD-10-CM

## 2021-05-18 DIAGNOSIS — E78.2 MIXED HYPERLIPIDEMIA: ICD-10-CM

## 2021-05-18 DIAGNOSIS — R41.89 COGNITIVE IMPAIRMENT: ICD-10-CM

## 2021-05-18 PROCEDURE — 99213 OFFICE O/P EST LOW 20 MIN: CPT | Performed by: INTERNAL MEDICINE

## 2021-05-18 PROCEDURE — G0439 PPPS, SUBSEQ VISIT: HCPCS | Performed by: INTERNAL MEDICINE

## 2021-05-28 ENCOUNTER — ANTICOAGULATION VISIT (OUTPATIENT)
Dept: PHARMACY | Facility: HOSPITAL | Age: 76
End: 2021-05-28

## 2021-05-28 DIAGNOSIS — I48.0 PAROXYSMAL ATRIAL FIBRILLATION (HCC): Primary | ICD-10-CM

## 2021-05-28 LAB
INR PPP: 3 (ref 0.91–1.09)
PROTHROMBIN TIME: 35.8 SECONDS (ref 10–13.8)

## 2021-05-28 PROCEDURE — 36416 COLLJ CAPILLARY BLOOD SPEC: CPT

## 2021-05-28 PROCEDURE — 85610 PROTHROMBIN TIME: CPT

## 2021-06-15 ENCOUNTER — ANTICOAGULATION VISIT (OUTPATIENT)
Dept: PHARMACY | Facility: HOSPITAL | Age: 76
End: 2021-06-15

## 2021-06-15 DIAGNOSIS — I48.0 PAROXYSMAL ATRIAL FIBRILLATION (HCC): Primary | ICD-10-CM

## 2021-06-15 LAB
INR PPP: 4 (ref 0.91–1.09)
PROTHROMBIN TIME: 47.6 SECONDS (ref 10–13.8)

## 2021-06-15 PROCEDURE — 36416 COLLJ CAPILLARY BLOOD SPEC: CPT

## 2021-06-15 PROCEDURE — G0463 HOSPITAL OUTPT CLINIC VISIT: HCPCS

## 2021-06-15 PROCEDURE — 85610 PROTHROMBIN TIME: CPT

## 2021-06-16 ENCOUNTER — APPOINTMENT (OUTPATIENT)
Dept: PHARMACY | Facility: HOSPITAL | Age: 76
End: 2021-06-16

## 2021-06-16 ENCOUNTER — OFFICE VISIT (OUTPATIENT)
Dept: NEUROLOGY | Facility: CLINIC | Age: 76
End: 2021-06-16

## 2021-06-16 VITALS
OXYGEN SATURATION: 98 % | WEIGHT: 234 LBS | HEIGHT: 70 IN | SYSTOLIC BLOOD PRESSURE: 122 MMHG | DIASTOLIC BLOOD PRESSURE: 78 MMHG | BODY MASS INDEX: 33.5 KG/M2 | HEART RATE: 60 BPM

## 2021-06-16 DIAGNOSIS — E78.2 MIXED HYPERLIPIDEMIA: ICD-10-CM

## 2021-06-16 DIAGNOSIS — R26.81 UNSTEADY GAIT: ICD-10-CM

## 2021-06-16 DIAGNOSIS — E11.40 CONTROLLED TYPE 2 DIABETES MELLITUS WITH DIABETIC NEUROPATHY, WITHOUT LONG-TERM CURRENT USE OF INSULIN (HCC): ICD-10-CM

## 2021-06-16 DIAGNOSIS — I67.9 CEREBROVASCULAR DISEASE: Primary | ICD-10-CM

## 2021-06-16 DIAGNOSIS — F03.90 DEMENTIA WITHOUT BEHAVIORAL DISTURBANCE, UNSPECIFIED DEMENTIA TYPE: ICD-10-CM

## 2021-06-16 PROCEDURE — 99215 OFFICE O/P EST HI 40 MIN: CPT | Performed by: NURSE PRACTITIONER

## 2021-06-16 RX ORDER — MEMANTINE HYDROCHLORIDE 5 MG/1
TABLET ORAL
Qty: 70 TABLET | Refills: 0 | Status: SHIPPED | OUTPATIENT
Start: 2021-06-16 | End: 2021-06-16

## 2021-06-16 RX ORDER — MEMANTINE HYDROCHLORIDE 5 MG/1
TABLET ORAL
Qty: 70 TABLET | Refills: 0 | Status: SHIPPED | OUTPATIENT
Start: 2021-06-16 | End: 2021-06-18 | Stop reason: SDUPTHER

## 2021-06-17 PROBLEM — I67.9 CEREBROVASCULAR DISEASE: Status: ACTIVE | Noted: 2019-06-03

## 2021-06-17 PROBLEM — R26.81 UNSTEADY GAIT: Status: ACTIVE | Noted: 2019-04-02

## 2021-06-17 PROBLEM — F03.90 DEMENTIA WITHOUT BEHAVIORAL DISTURBANCE: Status: ACTIVE | Noted: 2021-06-17

## 2021-06-18 RX ORDER — MEMANTINE HYDROCHLORIDE 5 MG/1
TABLET ORAL
Qty: 70 TABLET | Refills: 0 | Status: SHIPPED | OUTPATIENT
Start: 2021-06-18 | End: 2021-07-16

## 2021-06-21 RX ORDER — LEVETIRACETAM 500 MG/1
TABLET ORAL
Qty: 180 TABLET | Refills: 3 | Status: SHIPPED | OUTPATIENT
Start: 2021-06-21 | End: 2022-06-20

## 2021-06-24 ENCOUNTER — ANTICOAGULATION VISIT (OUTPATIENT)
Dept: PHARMACY | Facility: HOSPITAL | Age: 76
End: 2021-06-24

## 2021-06-24 DIAGNOSIS — I48.0 PAROXYSMAL ATRIAL FIBRILLATION (HCC): Primary | ICD-10-CM

## 2021-06-24 LAB
INR PPP: 3.2 (ref 0.91–1.09)
PROTHROMBIN TIME: 38.8 SECONDS (ref 10–13.8)

## 2021-06-24 PROCEDURE — G0463 HOSPITAL OUTPT CLINIC VISIT: HCPCS

## 2021-06-24 PROCEDURE — 85610 PROTHROMBIN TIME: CPT

## 2021-06-24 PROCEDURE — 36416 COLLJ CAPILLARY BLOOD SPEC: CPT

## 2021-06-28 RX ORDER — WARFARIN SODIUM 5 MG/1
TABLET ORAL
Qty: 65 TABLET | Refills: 1 | Status: SHIPPED | OUTPATIENT
Start: 2021-06-28 | End: 2022-01-17

## 2021-07-07 ENCOUNTER — ANTICOAGULATION VISIT (OUTPATIENT)
Dept: PHARMACY | Facility: HOSPITAL | Age: 76
End: 2021-07-07

## 2021-07-07 DIAGNOSIS — I48.0 PAROXYSMAL ATRIAL FIBRILLATION (HCC): Primary | ICD-10-CM

## 2021-07-07 LAB
INR PPP: 3.6 (ref 0.91–1.09)
PROTHROMBIN TIME: 43.1 SECONDS (ref 10–13.8)

## 2021-07-07 PROCEDURE — 36416 COLLJ CAPILLARY BLOOD SPEC: CPT

## 2021-07-07 PROCEDURE — G0463 HOSPITAL OUTPT CLINIC VISIT: HCPCS

## 2021-07-07 PROCEDURE — 85610 PROTHROMBIN TIME: CPT

## 2021-07-16 RX ORDER — MEMANTINE HYDROCHLORIDE 10 MG/1
10 TABLET ORAL 2 TIMES DAILY
Qty: 60 TABLET | Refills: 2 | Status: SHIPPED | OUTPATIENT
Start: 2021-07-16 | End: 2021-10-04

## 2021-07-16 RX ORDER — MEMANTINE HYDROCHLORIDE 5 MG/1
TABLET ORAL
Qty: 70 TABLET | Refills: 0 | OUTPATIENT
Start: 2021-07-16

## 2021-07-20 RX ORDER — DONEPEZIL HYDROCHLORIDE 10 MG/1
TABLET, FILM COATED ORAL
Qty: 30 TABLET | Refills: 3 | Status: SHIPPED | OUTPATIENT
Start: 2021-07-20 | End: 2021-11-15

## 2021-07-21 ENCOUNTER — ANTICOAGULATION VISIT (OUTPATIENT)
Dept: PHARMACY | Facility: HOSPITAL | Age: 76
End: 2021-07-21

## 2021-07-21 DIAGNOSIS — I48.0 PAROXYSMAL ATRIAL FIBRILLATION (HCC): Primary | ICD-10-CM

## 2021-07-21 LAB
INR PPP: 3.2 (ref 0.91–1.09)
PROTHROMBIN TIME: 38.9 SECONDS (ref 10–13.8)

## 2021-07-21 PROCEDURE — G0463 HOSPITAL OUTPT CLINIC VISIT: HCPCS

## 2021-07-21 PROCEDURE — 36416 COLLJ CAPILLARY BLOOD SPEC: CPT

## 2021-07-21 PROCEDURE — 85610 PROTHROMBIN TIME: CPT

## 2021-08-04 ENCOUNTER — ANTICOAGULATION VISIT (OUTPATIENT)
Dept: PHARMACY | Facility: HOSPITAL | Age: 76
End: 2021-08-04

## 2021-08-04 DIAGNOSIS — I48.0 PAROXYSMAL ATRIAL FIBRILLATION (HCC): Primary | ICD-10-CM

## 2021-08-04 LAB
INR PPP: 2.2 (ref 0.91–1.09)
PROTHROMBIN TIME: 27 SECONDS (ref 10–13.8)

## 2021-08-04 PROCEDURE — 36416 COLLJ CAPILLARY BLOOD SPEC: CPT

## 2021-08-04 PROCEDURE — 85610 PROTHROMBIN TIME: CPT

## 2021-08-16 RX ORDER — MEMANTINE HYDROCHLORIDE 5 MG/1
TABLET ORAL
Qty: 70 TABLET | Refills: 0 | OUTPATIENT
Start: 2021-08-16

## 2021-08-18 ENCOUNTER — ANTICOAGULATION VISIT (OUTPATIENT)
Dept: PHARMACY | Facility: HOSPITAL | Age: 76
End: 2021-08-18

## 2021-08-18 DIAGNOSIS — I48.0 PAROXYSMAL ATRIAL FIBRILLATION (HCC): Primary | ICD-10-CM

## 2021-08-18 LAB
INR PPP: 2.9 (ref 0.91–1.09)
PROTHROMBIN TIME: 35 SECONDS (ref 10–13.8)

## 2021-08-18 PROCEDURE — 85610 PROTHROMBIN TIME: CPT

## 2021-08-18 PROCEDURE — 36416 COLLJ CAPILLARY BLOOD SPEC: CPT

## 2021-08-30 RX ORDER — POTASSIUM CHLORIDE 750 MG/1
TABLET, FILM COATED, EXTENDED RELEASE ORAL
Qty: 90 TABLET | Refills: 1 | Status: SHIPPED | OUTPATIENT
Start: 2021-08-30 | End: 2022-02-28

## 2021-09-10 RX ORDER — METFORMIN HYDROCHLORIDE 500 MG/1
TABLET, EXTENDED RELEASE ORAL
Qty: 180 TABLET | Refills: 1 | Status: SHIPPED | OUTPATIENT
Start: 2021-09-10 | End: 2022-05-19 | Stop reason: SDUPTHER

## 2021-09-11 RX ORDER — MEMANTINE HYDROCHLORIDE 5 MG/1
TABLET ORAL
Qty: 70 TABLET | Refills: 0 | OUTPATIENT
Start: 2021-09-11

## 2021-09-15 ENCOUNTER — ANTICOAGULATION VISIT (OUTPATIENT)
Dept: PHARMACY | Facility: HOSPITAL | Age: 76
End: 2021-09-15

## 2021-09-15 DIAGNOSIS — I48.0 PAROXYSMAL ATRIAL FIBRILLATION (HCC): Primary | ICD-10-CM

## 2021-09-15 LAB
INR PPP: 2.3 (ref 0.91–1.09)
PROTHROMBIN TIME: 28.1 SECONDS (ref 10–13.8)

## 2021-09-15 PROCEDURE — 85610 PROTHROMBIN TIME: CPT

## 2021-09-15 PROCEDURE — 36416 COLLJ CAPILLARY BLOOD SPEC: CPT

## 2021-09-20 RX ORDER — MEMANTINE HYDROCHLORIDE 5 MG/1
TABLET ORAL
Qty: 70 TABLET | Refills: 0 | OUTPATIENT
Start: 2021-09-20

## 2021-09-29 ENCOUNTER — OFFICE VISIT (OUTPATIENT)
Dept: NEUROLOGY | Facility: CLINIC | Age: 76
End: 2021-09-29

## 2021-09-29 VITALS
HEART RATE: 64 BPM | WEIGHT: 246 LBS | HEIGHT: 70 IN | OXYGEN SATURATION: 99 % | SYSTOLIC BLOOD PRESSURE: 120 MMHG | DIASTOLIC BLOOD PRESSURE: 76 MMHG | BODY MASS INDEX: 35.22 KG/M2 | RESPIRATION RATE: 16 BRPM

## 2021-09-29 DIAGNOSIS — F32.1 CURRENT MODERATE EPISODE OF MAJOR DEPRESSIVE DISORDER, UNSPECIFIED WHETHER RECURRENT (HCC): ICD-10-CM

## 2021-09-29 DIAGNOSIS — E78.2 MIXED HYPERLIPIDEMIA: ICD-10-CM

## 2021-09-29 DIAGNOSIS — E11.40 CONTROLLED TYPE 2 DIABETES MELLITUS WITH DIABETIC NEUROPATHY, WITHOUT LONG-TERM CURRENT USE OF INSULIN (HCC): ICD-10-CM

## 2021-09-29 DIAGNOSIS — I67.9 CEREBROVASCULAR DISEASE: Primary | ICD-10-CM
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
## 2021-09-29 DIAGNOSIS — F03.90 DEMENTIA WITHOUT BEHAVIORAL DISTURBANCE, UNSPECIFIED DEMENTIA TYPE: ICD-10-CM

## 2021-09-29 DIAGNOSIS — I10 ESSENTIAL HYPERTENSION: ICD-10-CM

## 2021-09-29 DIAGNOSIS — R26.81 UNSTEADY GAIT: ICD-10-CM

## 2021-09-29 PROCEDURE — 99214 OFFICE O/P EST MOD 30 MIN: CPT | Performed by: NURSE PRACTITIONER

## 2021-09-29 RX ORDER — FLUOXETINE 10 MG/1
10 CAPSULE ORAL DAILY
Qty: 30 CAPSULE | Refills: 2 | Status: SHIPPED | OUTPATIENT
Start: 2021-09-29 | End: 2022-01-05 | Stop reason: SDUPTHER

## 2021-10-04 RX ORDER — MEMANTINE HYDROCHLORIDE 10 MG/1
TABLET ORAL
Qty: 60 TABLET | Refills: 2 | Status: SHIPPED | OUTPATIENT
Start: 2021-10-04 | End: 2022-01-17

## 2021-10-13 ENCOUNTER — ANTICOAGULATION VISIT (OUTPATIENT)
Dept: PHARMACY | Facility: HOSPITAL | Age: 76
End: 2021-10-13

## 2021-10-13 DIAGNOSIS — I48.0 PAROXYSMAL ATRIAL FIBRILLATION (HCC): Primary | ICD-10-CM

## 2021-10-13 LAB
INR PPP: 2.5 (ref 0.91–1.09)
PROTHROMBIN TIME: 30 SECONDS (ref 10–13.8)

## 2021-10-13 PROCEDURE — 85610 PROTHROMBIN TIME: CPT

## 2021-10-13 PROCEDURE — 36416 COLLJ CAPILLARY BLOOD SPEC: CPT

## 2021-10-18 RX ORDER — LEVOTHYROXINE SODIUM 0.05 MG/1
TABLET ORAL
Qty: 90 TABLET | Refills: 1 | Status: SHIPPED | OUTPATIENT
Start: 2021-10-18 | End: 2022-04-25

## 2021-11-01 RX ORDER — PANTOPRAZOLE SODIUM 40 MG/1
TABLET, DELAYED RELEASE ORAL
Qty: 90 TABLET | Refills: 3 | Status: SHIPPED | OUTPATIENT
Start: 2021-11-01 | End: 2022-10-31

## 2021-11-10 ENCOUNTER — ANTICOAGULATION VISIT (OUTPATIENT)
Dept: PHARMACY | Facility: HOSPITAL | Age: 76
End: 2021-11-10

## 2021-11-10 DIAGNOSIS — E78.2 MIXED HYPERLIPIDEMIA: ICD-10-CM

## 2021-11-10 DIAGNOSIS — I48.0 PAROXYSMAL ATRIAL FIBRILLATION (HCC): Primary | ICD-10-CM

## 2021-11-10 DIAGNOSIS — I10 ESSENTIAL HYPERTENSION: ICD-10-CM

## 2021-11-10 DIAGNOSIS — I48.0 PAROXYSMAL ATRIAL FIBRILLATION (HCC): ICD-10-CM

## 2021-11-10 DIAGNOSIS — E11.40 CONTROLLED TYPE 2 DIABETES MELLITUS WITH DIABETIC NEUROPATHY, WITHOUT LONG-TERM CURRENT USE OF INSULIN (HCC): ICD-10-CM

## 2021-11-10 LAB
INR PPP: 2.3 (ref 0.91–1.09)
PROTHROMBIN TIME: 27.6 SECONDS (ref 10–13.8)

## 2021-11-10 PROCEDURE — 36416 COLLJ CAPILLARY BLOOD SPEC: CPT

## 2021-11-10 PROCEDURE — 85610 PROTHROMBIN TIME: CPT

## 2021-11-11 ENCOUNTER — FLU SHOT (OUTPATIENT)
Dept: INTERNAL MEDICINE | Facility: CLINIC | Age: 76
End: 2021-11-11

## 2021-11-11 DIAGNOSIS — Z23 NEED FOR INFLUENZA VACCINATION: Primary | ICD-10-CM

## 2021-11-11 PROCEDURE — 90662 IIV NO PRSV INCREASED AG IM: CPT | Performed by: INTERNAL MEDICINE

## 2021-11-11 PROCEDURE — G0008 ADMIN INFLUENZA VIRUS VAC: HCPCS | Performed by: INTERNAL MEDICINE

## 2021-11-12 LAB
ALBUMIN SERPL-MCNC: 4 G/DL (ref 3.7–4.7)
ALBUMIN/GLOB SERPL: 1.3 {RATIO} (ref 1.2–2.2)
ALP SERPL-CCNC: 122 IU/L (ref 44–121)
ALT SERPL-CCNC: 22 IU/L (ref 0–44)
AST SERPL-CCNC: 15 IU/L (ref 0–40)
BASOPHILS # BLD AUTO: 0 X10E3/UL (ref 0–0.2)
BASOPHILS NFR BLD AUTO: 0 %
BILIRUB SERPL-MCNC: 0.4 MG/DL (ref 0–1.2)
BUN SERPL-MCNC: 15 MG/DL (ref 8–27)
BUN/CREAT SERPL: 12 (ref 10–24)
CALCIUM SERPL-MCNC: 9.1 MG/DL (ref 8.6–10.2)
CHLORIDE SERPL-SCNC: 102 MMOL/L (ref 96–106)
CHOLEST SERPL-MCNC: 122 MG/DL (ref 100–199)
CO2 SERPL-SCNC: 25 MMOL/L (ref 20–29)
CREAT SERPL-MCNC: 1.24 MG/DL (ref 0.76–1.27)
EOSINOPHIL # BLD AUTO: 0.4 X10E3/UL (ref 0–0.4)
EOSINOPHIL NFR BLD AUTO: 4 %
ERYTHROCYTE [DISTWIDTH] IN BLOOD BY AUTOMATED COUNT: 13.4 % (ref 11.6–15.4)
FT4I SERPL CALC-MCNC: 1.9 (ref 1.2–4.9)
GLOBULIN SER CALC-MCNC: 3 G/DL (ref 1.5–4.5)
GLUCOSE SERPL-MCNC: 122 MG/DL (ref 65–99)
HBA1C MFR BLD: 6.8 % (ref 4.8–5.6)
HCT VFR BLD AUTO: 41.7 % (ref 37.5–51)
HDLC SERPL-MCNC: 37 MG/DL
HGB BLD-MCNC: 14.1 G/DL (ref 13–17.7)
IMM GRANULOCYTES # BLD AUTO: 0.1 X10E3/UL (ref 0–0.1)
IMM GRANULOCYTES NFR BLD AUTO: 1 %
LDLC SERPL CALC-MCNC: 55 MG/DL (ref 0–99)
LDLC/HDLC SERPL: 1.5 RATIO (ref 0–3.6)
LYMPHOCYTES # BLD AUTO: 3.1 X10E3/UL (ref 0.7–3.1)
LYMPHOCYTES NFR BLD AUTO: 30 %
MCH RBC QN AUTO: 28.8 PG (ref 26.6–33)
MCHC RBC AUTO-ENTMCNC: 33.8 G/DL (ref 31.5–35.7)
MCV RBC AUTO: 85 FL (ref 79–97)
MONOCYTES # BLD AUTO: 0.8 X10E3/UL (ref 0.1–0.9)
MONOCYTES NFR BLD AUTO: 8 %
NEUTROPHILS # BLD AUTO: 5.9 X10E3/UL (ref 1.4–7)
NEUTROPHILS NFR BLD AUTO: 57 %
PLATELET # BLD AUTO: 254 X10E3/UL (ref 150–450)
POTASSIUM SERPL-SCNC: 3.9 MMOL/L (ref 3.5–5.2)
PROT SERPL-MCNC: 7 G/DL (ref 6–8.5)
RBC # BLD AUTO: 4.89 X10E6/UL (ref 4.14–5.8)
SODIUM SERPL-SCNC: 143 MMOL/L (ref 134–144)
T3RU NFR SERPL: 28 % (ref 24–39)
T4 SERPL-MCNC: 6.7 UG/DL (ref 4.5–12)
TRIGL SERPL-MCNC: 182 MG/DL (ref 0–149)
TSH SERPL DL<=0.005 MIU/L-ACNC: 2.34 UIU/ML (ref 0.45–4.5)
VLDLC SERPL CALC-MCNC: 30 MG/DL (ref 5–40)
WBC # BLD AUTO: 10.4 X10E3/UL (ref 3.4–10.8)

## 2021-11-15 RX ORDER — DONEPEZIL HYDROCHLORIDE 10 MG/1
TABLET, FILM COATED ORAL
Qty: 60 TABLET | Refills: 1 | Status: SHIPPED | OUTPATIENT
Start: 2021-11-15 | End: 2022-03-21

## 2021-11-18 ENCOUNTER — OFFICE VISIT (OUTPATIENT)
Dept: INTERNAL MEDICINE | Facility: CLINIC | Age: 76
End: 2021-11-18

## 2021-11-18 VITALS
SYSTOLIC BLOOD PRESSURE: 128 MMHG | OXYGEN SATURATION: 98 % | HEART RATE: 58 BPM | DIASTOLIC BLOOD PRESSURE: 80 MMHG | WEIGHT: 247.2 LBS | BODY MASS INDEX: 35.39 KG/M2 | HEIGHT: 70 IN | TEMPERATURE: 97.5 F

## 2021-11-18 DIAGNOSIS — R35.1 NOCTURIA: Primary | ICD-10-CM

## 2021-11-18 DIAGNOSIS — I10 PRIMARY HYPERTENSION: ICD-10-CM

## 2021-11-18 DIAGNOSIS — E11.40 CONTROLLED TYPE 2 DIABETES MELLITUS WITH DIABETIC NEUROPATHY, WITHOUT LONG-TERM CURRENT USE OF INSULIN (HCC): ICD-10-CM

## 2021-11-18 DIAGNOSIS — E78.2 MIXED HYPERLIPIDEMIA: ICD-10-CM

## 2021-11-18 DIAGNOSIS — K21.00 GASTROESOPHAGEAL REFLUX DISEASE WITH ESOPHAGITIS, UNSPECIFIED WHETHER HEMORRHAGE: ICD-10-CM

## 2021-11-18 PROCEDURE — 99214 OFFICE O/P EST MOD 30 MIN: CPT | Performed by: INTERNAL MEDICINE

## 2021-12-08 ENCOUNTER — APPOINTMENT (OUTPATIENT)
Dept: PHARMACY | Facility: HOSPITAL | Age: 76
End: 2021-12-08

## 2021-12-10 ENCOUNTER — ANTICOAGULATION VISIT (OUTPATIENT)
Dept: PHARMACY | Facility: HOSPITAL | Age: 76
End: 2021-12-10

## 2021-12-10 DIAGNOSIS — I48.0 PAROXYSMAL ATRIAL FIBRILLATION (HCC): Primary | ICD-10-CM

## 2021-12-10 LAB
INR PPP: 2 (ref 0.91–1.09)
PROTHROMBIN TIME: 23.9 SECONDS (ref 10–13.8)

## 2021-12-10 PROCEDURE — 85610 PROTHROMBIN TIME: CPT

## 2021-12-10 PROCEDURE — 36416 COLLJ CAPILLARY BLOOD SPEC: CPT

## 2021-12-13 RX ORDER — METOPROLOL SUCCINATE 25 MG/1
TABLET, EXTENDED RELEASE ORAL
Qty: 45 TABLET | Refills: 3 | Status: SHIPPED | OUTPATIENT
Start: 2021-12-13 | End: 2022-11-01

## 2022-01-03 RX ORDER — ATORVASTATIN CALCIUM 40 MG/1
TABLET, FILM COATED ORAL
Qty: 90 TABLET | Refills: 1 | Status: SHIPPED | OUTPATIENT
Start: 2022-01-03 | End: 2022-07-11

## 2022-01-04 ENCOUNTER — APPOINTMENT (OUTPATIENT)
Dept: PHARMACY | Facility: HOSPITAL | Age: 77
End: 2022-01-04

## 2022-01-05 RX ORDER — FLUOXETINE 10 MG/1
10 CAPSULE ORAL DAILY
Qty: 30 CAPSULE | Refills: 2 | Status: SHIPPED | OUTPATIENT
Start: 2022-01-05 | End: 2022-04-11

## 2022-01-17 RX ORDER — MEMANTINE HYDROCHLORIDE 10 MG/1
TABLET ORAL
Qty: 60 TABLET | Refills: 2 | Status: SHIPPED | OUTPATIENT
Start: 2022-01-17 | End: 2022-04-25 | Stop reason: SDUPTHER

## 2022-01-17 RX ORDER — WARFARIN SODIUM 5 MG/1
TABLET ORAL
Qty: 60 TABLET | Refills: 0 | Status: SHIPPED | OUTPATIENT
Start: 2022-01-17 | End: 2022-04-25

## 2022-02-11 DIAGNOSIS — K21.00 GASTROESOPHAGEAL REFLUX DISEASE WITH ESOPHAGITIS, UNSPECIFIED WHETHER HEMORRHAGE: ICD-10-CM

## 2022-02-11 DIAGNOSIS — I10 PRIMARY HYPERTENSION: ICD-10-CM

## 2022-02-11 DIAGNOSIS — E11.40 CONTROLLED TYPE 2 DIABETES MELLITUS WITH DIABETIC NEUROPATHY, WITHOUT LONG-TERM CURRENT USE OF INSULIN: ICD-10-CM

## 2022-02-14 ENCOUNTER — ANTICOAGULATION VISIT (OUTPATIENT)
Dept: PHARMACY | Facility: HOSPITAL | Age: 77
End: 2022-02-14

## 2022-02-14 DIAGNOSIS — I48.0 PAROXYSMAL ATRIAL FIBRILLATION: Primary | ICD-10-CM

## 2022-02-14 LAB
INR PPP: 2 (ref 0.91–1.09)
PROTHROMBIN TIME: 24.2 SECONDS (ref 10–13.8)

## 2022-02-14 PROCEDURE — 85610 PROTHROMBIN TIME: CPT

## 2022-02-14 PROCEDURE — 36416 COLLJ CAPILLARY BLOOD SPEC: CPT

## 2022-02-15 ENCOUNTER — OFFICE VISIT (OUTPATIENT)
Dept: GASTROENTEROLOGY | Facility: CLINIC | Age: 77
End: 2022-02-15

## 2022-02-15 VITALS
HEART RATE: 75 BPM | BODY MASS INDEX: 35.59 KG/M2 | DIASTOLIC BLOOD PRESSURE: 76 MMHG | SYSTOLIC BLOOD PRESSURE: 144 MMHG | HEIGHT: 70 IN | OXYGEN SATURATION: 98 % | WEIGHT: 248.6 LBS | TEMPERATURE: 97.6 F

## 2022-02-15 DIAGNOSIS — Z86.010 HISTORY OF COLONIC POLYPS: ICD-10-CM

## 2022-02-15 DIAGNOSIS — R15.9 INCONTINENCE OF FECES, UNSPECIFIED FECAL INCONTINENCE TYPE: ICD-10-CM

## 2022-02-15 DIAGNOSIS — R19.7 DIARRHEA, UNSPECIFIED TYPE: Primary | ICD-10-CM

## 2022-02-15 LAB
ALBUMIN SERPL-MCNC: 4.2 G/DL (ref 3.7–4.7)
ALBUMIN/GLOB SERPL: 1.5 {RATIO} (ref 1.2–2.2)
ALP SERPL-CCNC: 121 IU/L (ref 44–121)
ALT SERPL-CCNC: 25 IU/L (ref 0–44)
AST SERPL-CCNC: 17 IU/L (ref 0–40)
BILIRUB SERPL-MCNC: 0.4 MG/DL (ref 0–1.2)
BUN SERPL-MCNC: 16 MG/DL (ref 8–27)
BUN/CREAT SERPL: 11 (ref 10–24)
CALCIUM SERPL-MCNC: 9.4 MG/DL (ref 8.6–10.2)
CHLORIDE SERPL-SCNC: 104 MMOL/L (ref 96–106)
CO2 SERPL-SCNC: 25 MMOL/L (ref 20–29)
CREAT SERPL-MCNC: 1.4 MG/DL (ref 0.76–1.27)
GLOBULIN SER CALC-MCNC: 2.8 G/DL (ref 1.5–4.5)
GLUCOSE SERPL-MCNC: 133 MG/DL (ref 65–99)
HBA1C MFR BLD: 7.3 % (ref 4.8–5.6)
POTASSIUM SERPL-SCNC: 4.4 MMOL/L (ref 3.5–5.2)
PROT SERPL-MCNC: 7 G/DL (ref 6–8.5)
SODIUM SERPL-SCNC: 144 MMOL/L (ref 134–144)

## 2022-02-15 PROCEDURE — 99214 OFFICE O/P EST MOD 30 MIN: CPT | Performed by: INTERNAL MEDICINE

## 2022-02-21 ENCOUNTER — OFFICE VISIT (OUTPATIENT)
Dept: INTERNAL MEDICINE | Facility: CLINIC | Age: 77
End: 2022-02-21

## 2022-02-21 VITALS
HEART RATE: 50 BPM | TEMPERATURE: 97.1 F | BODY MASS INDEX: 35.62 KG/M2 | SYSTOLIC BLOOD PRESSURE: 112 MMHG | DIASTOLIC BLOOD PRESSURE: 64 MMHG | HEIGHT: 70 IN | WEIGHT: 248.8 LBS | OXYGEN SATURATION: 99 %

## 2022-02-21 DIAGNOSIS — E78.2 MIXED HYPERLIPIDEMIA: ICD-10-CM

## 2022-02-21 DIAGNOSIS — E11.40 CONTROLLED TYPE 2 DIABETES MELLITUS WITH DIABETIC NEUROPATHY, WITHOUT LONG-TERM CURRENT USE OF INSULIN: ICD-10-CM

## 2022-02-21 DIAGNOSIS — K21.00 GASTROESOPHAGEAL REFLUX DISEASE WITH ESOPHAGITIS, UNSPECIFIED WHETHER HEMORRHAGE: ICD-10-CM

## 2022-02-21 DIAGNOSIS — I10 PRIMARY HYPERTENSION: Primary | ICD-10-CM

## 2022-02-21 PROCEDURE — 99214 OFFICE O/P EST MOD 30 MIN: CPT | Performed by: INTERNAL MEDICINE

## 2022-02-23 LAB
BACTERIA SPEC CULT: NORMAL
BACTERIA SPEC CULT: NORMAL
C DIFF TOX GENS STL QL NAA+PROBE: NORMAL
CAMPYLOBACTER STL CULT: NORMAL
E COLI SXT STL QL IA: NEGATIVE
FA STL QL: NORMAL
G LAMBLIA AG STL QL IA: NEGATIVE
NEUTRAL FAT STL QL: NORMAL
O+P SPEC MICRO: NORMAL
O+P STL CONC: NORMAL
SALM + SHIG STL CULT: NORMAL
SPECIMEN STATUS: NORMAL
WBC STL QL MICRO: NORMAL
WBC STL QL MICRO: NORMAL

## 2022-02-28 RX ORDER — POTASSIUM CHLORIDE 750 MG/1
TABLET, FILM COATED, EXTENDED RELEASE ORAL
Qty: 90 TABLET | Refills: 1 | Status: SHIPPED | OUTPATIENT
Start: 2022-02-28 | End: 2022-08-31

## 2022-03-14 ENCOUNTER — ANTICOAGULATION VISIT (OUTPATIENT)
Dept: PHARMACY | Facility: HOSPITAL | Age: 77
End: 2022-03-14

## 2022-03-14 DIAGNOSIS — I48.0 PAROXYSMAL ATRIAL FIBRILLATION: Primary | ICD-10-CM

## 2022-03-14 LAB
INR PPP: 2.4 (ref 0.91–1.09)
PROTHROMBIN TIME: 29.1 SECONDS (ref 10–13.8)

## 2022-03-14 PROCEDURE — 36416 COLLJ CAPILLARY BLOOD SPEC: CPT

## 2022-03-14 PROCEDURE — 85610 PROTHROMBIN TIME: CPT

## 2022-03-21 RX ORDER — DONEPEZIL HYDROCHLORIDE 10 MG/1
TABLET, FILM COATED ORAL
Qty: 60 TABLET | Refills: 1 | Status: SHIPPED | OUTPATIENT
Start: 2022-03-21 | End: 2022-07-19

## 2022-03-30 ENCOUNTER — OFFICE VISIT (OUTPATIENT)
Dept: NEUROLOGY | Facility: CLINIC | Age: 77
End: 2022-03-30

## 2022-03-30 VITALS
BODY MASS INDEX: 35.79 KG/M2 | HEIGHT: 70 IN | WEIGHT: 250 LBS | HEART RATE: 75 BPM | DIASTOLIC BLOOD PRESSURE: 70 MMHG | SYSTOLIC BLOOD PRESSURE: 116 MMHG | OXYGEN SATURATION: 97 %

## 2022-03-30 DIAGNOSIS — F03.90 DEMENTIA WITHOUT BEHAVIORAL DISTURBANCE, UNSPECIFIED DEMENTIA TYPE: ICD-10-CM

## 2022-03-30 DIAGNOSIS — I10 ESSENTIAL HYPERTENSION: ICD-10-CM

## 2022-03-30 DIAGNOSIS — E78.2 MIXED HYPERLIPIDEMIA: ICD-10-CM

## 2022-03-30 DIAGNOSIS — R26.81 UNSTEADY GAIT: ICD-10-CM

## 2022-03-30 DIAGNOSIS — I67.9 CEREBROVASCULAR DISEASE: Primary | ICD-10-CM

## 2022-03-30 DIAGNOSIS — E11.40 CONTROLLED TYPE 2 DIABETES MELLITUS WITH DIABETIC NEUROPATHY, WITHOUT LONG-TERM CURRENT USE OF INSULIN: ICD-10-CM

## 2022-03-30 PROCEDURE — 99214 OFFICE O/P EST MOD 30 MIN: CPT | Performed by: NURSE PRACTITIONER

## 2022-04-11 ENCOUNTER — ANTICOAGULATION VISIT (OUTPATIENT)
Dept: PHARMACY | Facility: HOSPITAL | Age: 77
End: 2022-04-11

## 2022-04-11 DIAGNOSIS — I48.0 PAROXYSMAL ATRIAL FIBRILLATION: Primary | ICD-10-CM

## 2022-04-11 LAB
INR PPP: 2.2 (ref 0.91–1.09)
PROTHROMBIN TIME: 26.6 SECONDS (ref 10–13.8)

## 2022-04-11 PROCEDURE — 36416 COLLJ CAPILLARY BLOOD SPEC: CPT

## 2022-04-11 PROCEDURE — 85610 PROTHROMBIN TIME: CPT

## 2022-04-11 RX ORDER — FLUOXETINE 10 MG/1
CAPSULE ORAL
Qty: 30 CAPSULE | Refills: 2 | Status: SHIPPED | OUTPATIENT
Start: 2022-04-11 | End: 2022-07-18 | Stop reason: SDUPTHER

## 2022-04-20 RX ORDER — LISINOPRIL AND HYDROCHLOROTHIAZIDE 20; 12.5 MG/1; MG/1
TABLET ORAL
Qty: 90 TABLET | Refills: 1 | Status: SHIPPED | OUTPATIENT
Start: 2022-04-20 | End: 2022-10-31

## 2022-04-25 RX ORDER — WARFARIN SODIUM 5 MG/1
TABLET ORAL
Qty: 60 TABLET | Refills: 0 | Status: SHIPPED | OUTPATIENT
Start: 2022-04-25 | End: 2022-08-01

## 2022-04-25 RX ORDER — MEMANTINE HYDROCHLORIDE 10 MG/1
10 TABLET ORAL 2 TIMES DAILY
Qty: 60 TABLET | Refills: 2 | Status: SHIPPED | OUTPATIENT
Start: 2022-04-25 | End: 2022-07-21 | Stop reason: SDUPTHER

## 2022-04-25 RX ORDER — LEVOTHYROXINE SODIUM 0.05 MG/1
TABLET ORAL
Qty: 90 TABLET | Refills: 1 | Status: SHIPPED | OUTPATIENT
Start: 2022-04-25 | End: 2022-10-18

## 2022-05-11 DIAGNOSIS — I10 PRIMARY HYPERTENSION: ICD-10-CM

## 2022-05-11 DIAGNOSIS — K21.00 GASTROESOPHAGEAL REFLUX DISEASE WITH ESOPHAGITIS, UNSPECIFIED WHETHER HEMORRHAGE: ICD-10-CM

## 2022-05-11 DIAGNOSIS — E11.40 CONTROLLED TYPE 2 DIABETES MELLITUS WITH DIABETIC NEUROPATHY, WITHOUT LONG-TERM CURRENT USE OF INSULIN: ICD-10-CM

## 2022-05-11 DIAGNOSIS — E78.2 MIXED HYPERLIPIDEMIA: ICD-10-CM

## 2022-05-17 LAB
ALBUMIN SERPL-MCNC: 4 G/DL (ref 3.7–4.7)
ALBUMIN/GLOB SERPL: 1.4 {RATIO} (ref 1.2–2.2)
ALP SERPL-CCNC: 121 IU/L (ref 44–121)
ALT SERPL-CCNC: 25 IU/L (ref 0–44)
AST SERPL-CCNC: 17 IU/L (ref 0–40)
BASOPHILS # BLD AUTO: 0.1 X10E3/UL (ref 0–0.2)
BASOPHILS NFR BLD AUTO: 1 %
BILIRUB SERPL-MCNC: 0.3 MG/DL (ref 0–1.2)
BUN SERPL-MCNC: 16 MG/DL (ref 8–27)
BUN/CREAT SERPL: 13 (ref 10–24)
CALCIUM SERPL-MCNC: 9.2 MG/DL (ref 8.6–10.2)
CHLORIDE SERPL-SCNC: 106 MMOL/L (ref 96–106)
CHOLEST SERPL-MCNC: 125 MG/DL (ref 100–199)
CO2 SERPL-SCNC: 21 MMOL/L (ref 20–29)
CREAT SERPL-MCNC: 1.25 MG/DL (ref 0.76–1.27)
EGFRCR SERPLBLD CKD-EPI 2021: 60 ML/MIN/1.73
EOSINOPHIL # BLD AUTO: 0.5 X10E3/UL (ref 0–0.4)
EOSINOPHIL NFR BLD AUTO: 5 %
ERYTHROCYTE [DISTWIDTH] IN BLOOD BY AUTOMATED COUNT: 13.5 % (ref 11.6–15.4)
GLOBULIN SER CALC-MCNC: 2.9 G/DL (ref 1.5–4.5)
GLUCOSE SERPL-MCNC: ABNORMAL MG/DL
HBA1C MFR BLD: 7.7 % (ref 4.8–5.6)
HCT VFR BLD AUTO: 42.8 % (ref 37.5–51)
HDLC SERPL-MCNC: 34 MG/DL
HGB BLD-MCNC: 14 G/DL (ref 13–17.7)
IMM GRANULOCYTES # BLD AUTO: 0.1 X10E3/UL (ref 0–0.1)
IMM GRANULOCYTES NFR BLD AUTO: 1 %
LDLC SERPL CALC-MCNC: 59 MG/DL (ref 0–99)
LDLC/HDLC SERPL: 1.7 RATIO (ref 0–3.6)
LYMPHOCYTES # BLD AUTO: 2.9 X10E3/UL (ref 0.7–3.1)
LYMPHOCYTES NFR BLD AUTO: 27 %
MCH RBC QN AUTO: 28.3 PG (ref 26.6–33)
MCHC RBC AUTO-ENTMCNC: 32.7 G/DL (ref 31.5–35.7)
MCV RBC AUTO: 87 FL (ref 79–97)
MONOCYTES # BLD AUTO: 0.9 X10E3/UL (ref 0.1–0.9)
MONOCYTES NFR BLD AUTO: 9 %
NEUTROPHILS # BLD AUTO: 6.2 X10E3/UL (ref 1.4–7)
NEUTROPHILS NFR BLD AUTO: 57 %
PLATELET # BLD AUTO: 239 X10E3/UL (ref 150–450)
POTASSIUM SERPL-SCNC: ABNORMAL MMOL/L
PROT SERPL-MCNC: 6.9 G/DL (ref 6–8.5)
RBC # BLD AUTO: 4.95 X10E6/UL (ref 4.14–5.8)
SODIUM SERPL-SCNC: 145 MMOL/L (ref 134–144)
TRIGL SERPL-MCNC: 192 MG/DL (ref 0–149)
TSH SERPL DL<=0.005 MIU/L-ACNC: 2.53 UIU/ML (ref 0.45–4.5)
VLDLC SERPL CALC-MCNC: 32 MG/DL (ref 5–40)
WBC # BLD AUTO: 10.6 X10E3/UL (ref 3.4–10.8)

## 2022-05-19 ENCOUNTER — OFFICE VISIT (OUTPATIENT)
Dept: INTERNAL MEDICINE | Facility: CLINIC | Age: 77
End: 2022-05-19

## 2022-05-19 VITALS
HEIGHT: 70 IN | HEART RATE: 60 BPM | BODY MASS INDEX: 35.62 KG/M2 | OXYGEN SATURATION: 98 % | WEIGHT: 248.8 LBS | TEMPERATURE: 97.5 F | DIASTOLIC BLOOD PRESSURE: 82 MMHG | SYSTOLIC BLOOD PRESSURE: 140 MMHG

## 2022-05-19 DIAGNOSIS — E11.40 CONTROLLED TYPE 2 DIABETES MELLITUS WITH DIABETIC NEUROPATHY, WITHOUT LONG-TERM CURRENT USE OF INSULIN: ICD-10-CM

## 2022-05-19 DIAGNOSIS — E03.8 OTHER SPECIFIED HYPOTHYROIDISM: ICD-10-CM

## 2022-05-19 DIAGNOSIS — I48.0 PAROXYSMAL ATRIAL FIBRILLATION: ICD-10-CM

## 2022-05-19 DIAGNOSIS — R06.09 DOE (DYSPNEA ON EXERTION): ICD-10-CM

## 2022-05-19 DIAGNOSIS — I10 PRIMARY HYPERTENSION: ICD-10-CM

## 2022-05-19 DIAGNOSIS — E78.2 MIXED HYPERLIPIDEMIA: Primary | ICD-10-CM

## 2022-05-19 PROCEDURE — 99214 OFFICE O/P EST MOD 30 MIN: CPT | Performed by: INTERNAL MEDICINE

## 2022-05-19 PROCEDURE — 93000 ELECTROCARDIOGRAM COMPLETE: CPT | Performed by: INTERNAL MEDICINE

## 2022-05-19 RX ORDER — METFORMIN HYDROCHLORIDE 500 MG/1
1000 TABLET, EXTENDED RELEASE ORAL DAILY
Qty: 180 TABLET | Refills: 1 | Status: SHIPPED | OUTPATIENT
Start: 2022-05-19

## 2022-05-31 ENCOUNTER — TELEPHONE (OUTPATIENT)
Dept: PHARMACY | Facility: HOSPITAL | Age: 77
End: 2022-05-31

## 2022-06-09 ENCOUNTER — ANTICOAGULATION VISIT (OUTPATIENT)
Dept: PHARMACY | Facility: HOSPITAL | Age: 77
End: 2022-06-09

## 2022-06-09 DIAGNOSIS — I48.0 PAROXYSMAL ATRIAL FIBRILLATION: Primary | ICD-10-CM

## 2022-06-09 LAB
INR PPP: 1.9 (ref 0.91–1.09)
PROTHROMBIN TIME: 22.8 SECONDS (ref 10–13.8)

## 2022-06-09 PROCEDURE — 85610 PROTHROMBIN TIME: CPT

## 2022-06-09 PROCEDURE — 36416 COLLJ CAPILLARY BLOOD SPEC: CPT

## 2022-06-20 RX ORDER — LEVETIRACETAM 500 MG/1
TABLET ORAL
Qty: 180 TABLET | Refills: 3 | Status: SHIPPED | OUTPATIENT
Start: 2022-06-20

## 2022-06-24 ENCOUNTER — IMMUNIZATION (OUTPATIENT)
Dept: VACCINE CLINIC | Facility: HOSPITAL | Age: 77
End: 2022-06-24

## 2022-06-24 DIAGNOSIS — Z23 NEED FOR VACCINATION: Primary | ICD-10-CM

## 2022-06-24 PROCEDURE — 91305 HC SARSCOV2 VAC 30 MCG TRS-SUCR PFIZER: CPT | Performed by: INTERNAL MEDICINE

## 2022-06-24 PROCEDURE — 0054A HC ADM SARSCV2 30MCG TRS-SUCR BOOSTER: CPT | Performed by: INTERNAL MEDICINE

## 2022-07-06 RX ORDER — SITAGLIPTIN 100 MG/1
TABLET, FILM COATED ORAL
Qty: 90 TABLET | Refills: 1 | Status: SHIPPED | OUTPATIENT
Start: 2022-07-06

## 2022-07-11 RX ORDER — ATORVASTATIN CALCIUM 40 MG/1
TABLET, FILM COATED ORAL
Qty: 90 TABLET | Refills: 1 | Status: SHIPPED | OUTPATIENT
Start: 2022-07-11 | End: 2023-01-18

## 2022-07-18 RX ORDER — FLUOXETINE 10 MG/1
10 CAPSULE ORAL DAILY
Qty: 30 CAPSULE | Refills: 2 | Status: SHIPPED | OUTPATIENT
Start: 2022-07-18 | End: 2022-10-03

## 2022-07-19 RX ORDER — DONEPEZIL HYDROCHLORIDE 10 MG/1
TABLET, FILM COATED ORAL
Qty: 60 TABLET | Refills: 1 | Status: SHIPPED | OUTPATIENT
Start: 2022-07-19 | End: 2022-10-24

## 2022-07-22 ENCOUNTER — ANTICOAGULATION VISIT (OUTPATIENT)
Dept: PHARMACY | Facility: HOSPITAL | Age: 77
End: 2022-07-22

## 2022-07-22 DIAGNOSIS — I48.0 PAROXYSMAL ATRIAL FIBRILLATION: Primary | ICD-10-CM

## 2022-07-22 LAB
INR PPP: 2.9 (ref 0.91–1.09)
PROTHROMBIN TIME: 34.8 SECONDS (ref 10–13.8)

## 2022-07-22 PROCEDURE — 85610 PROTHROMBIN TIME: CPT

## 2022-07-22 PROCEDURE — 36416 COLLJ CAPILLARY BLOOD SPEC: CPT

## 2022-07-22 RX ORDER — MEMANTINE HYDROCHLORIDE 10 MG/1
10 TABLET ORAL 2 TIMES DAILY
Qty: 60 TABLET | Refills: 2 | Status: SHIPPED | OUTPATIENT
Start: 2022-07-22 | End: 2022-08-22 | Stop reason: SDUPTHER

## 2022-07-22 RX ORDER — MEMANTINE HYDROCHLORIDE 10 MG/1
TABLET ORAL
Qty: 180 TABLET | OUTPATIENT
Start: 2022-07-22

## 2022-08-01 RX ORDER — WARFARIN SODIUM 5 MG/1
TABLET ORAL
Qty: 60 TABLET | Refills: 0 | Status: SHIPPED | OUTPATIENT
Start: 2022-08-01 | End: 2022-11-08

## 2022-08-08 ENCOUNTER — OFFICE VISIT (OUTPATIENT)
Dept: CARDIOLOGY | Facility: CLINIC | Age: 77
End: 2022-08-08

## 2022-08-08 VITALS
HEART RATE: 62 BPM | WEIGHT: 247 LBS | HEIGHT: 70 IN | DIASTOLIC BLOOD PRESSURE: 80 MMHG | BODY MASS INDEX: 35.36 KG/M2 | OXYGEN SATURATION: 98 % | SYSTOLIC BLOOD PRESSURE: 150 MMHG

## 2022-08-08 DIAGNOSIS — I10 PRIMARY HYPERTENSION: ICD-10-CM

## 2022-08-08 DIAGNOSIS — I48.0 PAROXYSMAL ATRIAL FIBRILLATION: Primary | ICD-10-CM

## 2022-08-08 PROCEDURE — 99214 OFFICE O/P EST MOD 30 MIN: CPT | Performed by: INTERNAL MEDICINE

## 2022-08-11 DIAGNOSIS — E11.40 CONTROLLED TYPE 2 DIABETES MELLITUS WITH DIABETIC NEUROPATHY, WITHOUT LONG-TERM CURRENT USE OF INSULIN: ICD-10-CM

## 2022-08-13 LAB
ALBUMIN SERPL-MCNC: 4.3 G/DL (ref 3.7–4.7)
ALBUMIN/GLOB SERPL: 1.5 {RATIO} (ref 1.2–2.2)
ALP SERPL-CCNC: 122 IU/L (ref 44–121)
ALT SERPL-CCNC: 27 IU/L (ref 0–44)
AST SERPL-CCNC: 20 IU/L (ref 0–40)
BILIRUB SERPL-MCNC: 0.6 MG/DL (ref 0–1.2)
BUN SERPL-MCNC: 12 MG/DL (ref 8–27)
BUN/CREAT SERPL: 9 (ref 10–24)
CALCIUM SERPL-MCNC: 8.9 MG/DL (ref 8.6–10.2)
CHLORIDE SERPL-SCNC: 100 MMOL/L (ref 96–106)
CO2 SERPL-SCNC: 23 MMOL/L (ref 20–29)
CREAT SERPL-MCNC: 1.32 MG/DL (ref 0.76–1.27)
EGFRCR SERPLBLD CKD-EPI 2021: 56 ML/MIN/1.73
GLOBULIN SER CALC-MCNC: 2.9 G/DL (ref 1.5–4.5)
GLUCOSE SERPL-MCNC: ABNORMAL MG/DL
HBA1C MFR BLD: 7.6 % (ref 4.8–5.6)
POTASSIUM SERPL-SCNC: ABNORMAL MMOL/L
PROT SERPL-MCNC: 7.2 G/DL (ref 6–8.5)
SODIUM SERPL-SCNC: 145 MMOL/L (ref 134–144)

## 2022-08-19 ENCOUNTER — TELEPHONE (OUTPATIENT)
Dept: NEUROLOGY | Facility: OTHER | Age: 77
End: 2022-08-19

## 2022-08-22 RX ORDER — MEMANTINE HYDROCHLORIDE 10 MG/1
10 TABLET ORAL 2 TIMES DAILY
Qty: 60 TABLET | Refills: 2 | Status: SHIPPED | OUTPATIENT
Start: 2022-08-22 | End: 2022-11-30 | Stop reason: SDUPTHER

## 2022-08-24 ENCOUNTER — OFFICE VISIT (OUTPATIENT)
Dept: INTERNAL MEDICINE | Facility: CLINIC | Age: 77
End: 2022-08-24

## 2022-08-24 VITALS
WEIGHT: 245 LBS | SYSTOLIC BLOOD PRESSURE: 120 MMHG | BODY MASS INDEX: 35.15 KG/M2 | HEART RATE: 72 BPM | DIASTOLIC BLOOD PRESSURE: 64 MMHG | TEMPERATURE: 97.5 F | OXYGEN SATURATION: 99 %

## 2022-08-24 DIAGNOSIS — E11.40 CONTROLLED TYPE 2 DIABETES MELLITUS WITH DIABETIC NEUROPATHY, WITHOUT LONG-TERM CURRENT USE OF INSULIN: ICD-10-CM

## 2022-08-24 DIAGNOSIS — R19.7 DIARRHEA, UNSPECIFIED TYPE: Primary | ICD-10-CM

## 2022-08-24 PROCEDURE — 99214 OFFICE O/P EST MOD 30 MIN: CPT | Performed by: INTERNAL MEDICINE

## 2022-08-31 RX ORDER — POTASSIUM CHLORIDE 750 MG/1
TABLET, FILM COATED, EXTENDED RELEASE ORAL
Qty: 90 TABLET | Refills: 1 | Status: SHIPPED | OUTPATIENT
Start: 2022-08-31 | End: 2023-02-27

## 2022-09-02 ENCOUNTER — ANTICOAGULATION VISIT (OUTPATIENT)
Dept: PHARMACY | Facility: HOSPITAL | Age: 77
End: 2022-09-02

## 2022-09-02 DIAGNOSIS — I48.0 PAROXYSMAL ATRIAL FIBRILLATION: Primary | ICD-10-CM

## 2022-09-02 LAB
INR PPP: 3.6 (ref 0.91–1.09)
PROTHROMBIN TIME: 43.5 SECONDS (ref 10–13.8)

## 2022-09-02 PROCEDURE — G0463 HOSPITAL OUTPT CLINIC VISIT: HCPCS

## 2022-09-02 PROCEDURE — 85610 PROTHROMBIN TIME: CPT

## 2022-09-02 PROCEDURE — 36416 COLLJ CAPILLARY BLOOD SPEC: CPT

## 2022-09-06 ENCOUNTER — TELEPHONE (OUTPATIENT)
Dept: GASTROENTEROLOGY | Facility: CLINIC | Age: 77
End: 2022-09-06

## 2022-09-16 ENCOUNTER — ANTICOAGULATION VISIT (OUTPATIENT)
Dept: PHARMACY | Facility: HOSPITAL | Age: 77
End: 2022-09-16

## 2022-09-16 ENCOUNTER — OFFICE VISIT (OUTPATIENT)
Dept: INTERNAL MEDICINE | Facility: CLINIC | Age: 77
End: 2022-09-16

## 2022-09-16 VITALS
TEMPERATURE: 97.3 F | HEIGHT: 70 IN | BODY MASS INDEX: 34.41 KG/M2 | DIASTOLIC BLOOD PRESSURE: 68 MMHG | OXYGEN SATURATION: 99 % | SYSTOLIC BLOOD PRESSURE: 132 MMHG | HEART RATE: 58 BPM | WEIGHT: 240.4 LBS

## 2022-09-16 DIAGNOSIS — I48.0 PAROXYSMAL ATRIAL FIBRILLATION: Primary | ICD-10-CM

## 2022-09-16 DIAGNOSIS — J06.9 ACUTE URI: Primary | ICD-10-CM

## 2022-09-16 LAB
INR PPP: 2.9 (ref 0.91–1.09)
PROTHROMBIN TIME: 34.8 SECONDS (ref 10–13.8)

## 2022-09-16 PROCEDURE — 99213 OFFICE O/P EST LOW 20 MIN: CPT | Performed by: INTERNAL MEDICINE

## 2022-09-16 PROCEDURE — 36416 COLLJ CAPILLARY BLOOD SPEC: CPT

## 2022-09-16 PROCEDURE — 85610 PROTHROMBIN TIME: CPT

## 2022-09-16 RX ORDER — BENZONATATE 100 MG/1
100 CAPSULE ORAL 3 TIMES DAILY PRN
Qty: 30 CAPSULE | Refills: 0 | Status: SHIPPED | OUTPATIENT
Start: 2022-09-16 | End: 2022-11-21

## 2022-09-16 RX ORDER — ALBUTEROL SULFATE 90 UG/1
2 AEROSOL, METERED RESPIRATORY (INHALATION) EVERY 6 HOURS PRN
Qty: 18 G | Refills: 0 | Status: SHIPPED | OUTPATIENT
Start: 2022-09-16 | End: 2022-11-21

## 2022-09-16 RX ORDER — AZITHROMYCIN 250 MG/1
TABLET, FILM COATED ORAL
Qty: 6 TABLET | Refills: 0 | Status: SHIPPED | OUTPATIENT
Start: 2022-09-16 | End: 2022-10-28

## 2022-09-30 ENCOUNTER — ANTICOAGULATION VISIT (OUTPATIENT)
Dept: PHARMACY | Facility: HOSPITAL | Age: 77
End: 2022-09-30

## 2022-09-30 DIAGNOSIS — I48.0 PAROXYSMAL ATRIAL FIBRILLATION: Primary | ICD-10-CM

## 2022-09-30 LAB
INR PPP: 3 (ref 0.91–1.09)
PROTHROMBIN TIME: 35.6 SECONDS (ref 10–13.8)

## 2022-09-30 PROCEDURE — 85610 PROTHROMBIN TIME: CPT

## 2022-09-30 PROCEDURE — 36416 COLLJ CAPILLARY BLOOD SPEC: CPT

## 2022-10-03 RX ORDER — FLUOXETINE 10 MG/1
CAPSULE ORAL
Qty: 180 CAPSULE | Refills: 3 | Status: SHIPPED | OUTPATIENT
Start: 2022-10-03

## 2022-10-13 ENCOUNTER — OFFICE VISIT (OUTPATIENT)
Dept: GASTROENTEROLOGY | Facility: CLINIC | Age: 77
End: 2022-10-13

## 2022-10-13 ENCOUNTER — PREP FOR SURGERY (OUTPATIENT)
Dept: SURGERY | Facility: SURGERY CENTER | Age: 77
End: 2022-10-13

## 2022-10-13 ENCOUNTER — TELEPHONE (OUTPATIENT)
Dept: GASTROENTEROLOGY | Facility: CLINIC | Age: 77
End: 2022-10-13

## 2022-10-13 VITALS
DIASTOLIC BLOOD PRESSURE: 78 MMHG | TEMPERATURE: 97.6 F | WEIGHT: 234.3 LBS | HEIGHT: 70 IN | HEART RATE: 82 BPM | BODY MASS INDEX: 33.54 KG/M2 | OXYGEN SATURATION: 98 % | SYSTOLIC BLOOD PRESSURE: 138 MMHG

## 2022-10-13 DIAGNOSIS — R19.7 DIARRHEA, UNSPECIFIED TYPE: Primary | ICD-10-CM

## 2022-10-13 DIAGNOSIS — K58.0 IRRITABLE BOWEL SYNDROME WITH DIARRHEA: ICD-10-CM

## 2022-10-13 PROCEDURE — 99214 OFFICE O/P EST MOD 30 MIN: CPT | Performed by: NURSE PRACTITIONER

## 2022-10-13 RX ORDER — SODIUM CHLORIDE 0.9 % (FLUSH) 0.9 %
10 SYRINGE (ML) INJECTION AS NEEDED
Status: CANCELLED | OUTPATIENT
Start: 2022-10-13

## 2022-10-13 RX ORDER — SODIUM CHLORIDE, SODIUM LACTATE, POTASSIUM CHLORIDE, CALCIUM CHLORIDE 600; 310; 30; 20 MG/100ML; MG/100ML; MG/100ML; MG/100ML
30 INJECTION, SOLUTION INTRAVENOUS CONTINUOUS PRN
Status: CANCELLED | OUTPATIENT
Start: 2022-10-13

## 2022-10-13 RX ORDER — SODIUM CHLORIDE 0.9 % (FLUSH) 0.9 %
3 SYRINGE (ML) INJECTION EVERY 12 HOURS SCHEDULED
Status: CANCELLED | OUTPATIENT
Start: 2022-10-13

## 2022-10-14 ENCOUNTER — TELEPHONE (OUTPATIENT)
Dept: CARDIOLOGY | Facility: CLINIC | Age: 77
End: 2022-10-14

## 2022-10-18 RX ORDER — LEVOTHYROXINE SODIUM 0.05 MG/1
TABLET ORAL
Qty: 90 TABLET | Refills: 1 | Status: SHIPPED | OUTPATIENT
Start: 2022-10-18

## 2022-10-21 ENCOUNTER — TELEPHONE (OUTPATIENT)
Dept: GASTROENTEROLOGY | Facility: CLINIC | Age: 77
End: 2022-10-21

## 2022-10-24 RX ORDER — DONEPEZIL HYDROCHLORIDE 10 MG/1
TABLET, FILM COATED ORAL
Qty: 90 TABLET | Refills: 3 | Status: SHIPPED | OUTPATIENT
Start: 2022-10-24

## 2022-10-28 ENCOUNTER — ANTICOAGULATION VISIT (OUTPATIENT)
Dept: PHARMACY | Facility: HOSPITAL | Age: 77
End: 2022-10-28

## 2022-10-28 DIAGNOSIS — I48.0 PAROXYSMAL ATRIAL FIBRILLATION: Primary | ICD-10-CM

## 2022-10-28 LAB
INR PPP: 2.8 (ref 0.91–1.09)
PROTHROMBIN TIME: 33.4 SECONDS (ref 10–13.8)

## 2022-10-28 PROCEDURE — 36416 COLLJ CAPILLARY BLOOD SPEC: CPT

## 2022-10-28 PROCEDURE — 85610 PROTHROMBIN TIME: CPT

## 2022-10-31 RX ORDER — LISINOPRIL AND HYDROCHLOROTHIAZIDE 20; 12.5 MG/1; MG/1
TABLET ORAL
Qty: 90 TABLET | Refills: 1 | Status: SHIPPED | OUTPATIENT
Start: 2022-10-31

## 2022-10-31 RX ORDER — PANTOPRAZOLE SODIUM 40 MG/1
TABLET, DELAYED RELEASE ORAL
Qty: 90 TABLET | Refills: 3 | Status: SHIPPED | OUTPATIENT
Start: 2022-10-31

## 2022-11-01 ENCOUNTER — PATIENT MESSAGE (OUTPATIENT)
Dept: GASTROENTEROLOGY | Facility: CLINIC | Age: 77
End: 2022-11-01

## 2022-11-01 RX ORDER — METOPROLOL SUCCINATE 25 MG/1
12.5 TABLET, EXTENDED RELEASE ORAL DAILY
Qty: 45 TABLET | Refills: 3 | OUTPATIENT
Start: 2022-11-01

## 2022-11-01 RX ORDER — METOPROLOL SUCCINATE 25 MG/1
TABLET, EXTENDED RELEASE ORAL
Qty: 45 TABLET | Refills: 3 | Status: SHIPPED | OUTPATIENT
Start: 2022-11-01

## 2022-11-07 RX ORDER — MONTELUKAST SODIUM 4 MG/1
1 TABLET, CHEWABLE ORAL 2 TIMES DAILY
Qty: 60 TABLET | Refills: 5 | Status: SHIPPED | OUTPATIENT
Start: 2022-11-07

## 2022-11-08 RX ORDER — WARFARIN SODIUM 5 MG/1
TABLET ORAL
Qty: 60 TABLET | Refills: 0 | Status: SHIPPED | OUTPATIENT
Start: 2022-11-08 | End: 2023-02-13

## 2022-11-10 DIAGNOSIS — R19.7 DIARRHEA, UNSPECIFIED TYPE: ICD-10-CM

## 2022-11-10 DIAGNOSIS — E11.40 CONTROLLED TYPE 2 DIABETES MELLITUS WITH DIABETIC NEUROPATHY, WITHOUT LONG-TERM CURRENT USE OF INSULIN: ICD-10-CM

## 2022-11-12 LAB
ALBUMIN SERPL-MCNC: 4.1 G/DL (ref 3.5–5.2)
ALBUMIN/GLOB SERPL: 1.7 G/DL
ALP SERPL-CCNC: 107 U/L (ref 39–117)
ALT SERPL-CCNC: 15 U/L (ref 1–41)
AST SERPL-CCNC: 17 U/L (ref 1–40)
BASOPHILS # BLD AUTO: 0.07 10*3/MM3 (ref 0–0.2)
BASOPHILS NFR BLD AUTO: 0.6 % (ref 0–1.5)
BILIRUB SERPL-MCNC: 0.6 MG/DL (ref 0–1.2)
BUN SERPL-MCNC: 12 MG/DL (ref 8–23)
BUN/CREAT SERPL: 10.6 (ref 7–25)
CALCIUM SERPL-MCNC: 8.6 MG/DL (ref 8.6–10.5)
CHLORIDE SERPL-SCNC: 101 MMOL/L (ref 98–107)
CHOLEST SERPL-MCNC: 112 MG/DL (ref 0–200)
CO2 SERPL-SCNC: 31 MMOL/L (ref 22–29)
CREAT SERPL-MCNC: 1.13 MG/DL (ref 0.76–1.27)
EGFRCR SERPLBLD CKD-EPI 2021: 67.8 ML/MIN/1.73
EOSINOPHIL # BLD AUTO: 0.56 10*3/MM3 (ref 0–0.4)
EOSINOPHIL NFR BLD AUTO: 5 % (ref 0.3–6.2)
ERYTHROCYTE [DISTWIDTH] IN BLOOD BY AUTOMATED COUNT: 13.5 % (ref 12.3–15.4)
GLOBULIN SER CALC-MCNC: 2.4 GM/DL
GLUCOSE SERPL-MCNC: 137 MG/DL (ref 65–99)
HBA1C MFR BLD: 6.8 % (ref 4.8–5.6)
HCT VFR BLD AUTO: 42.4 % (ref 37.5–51)
HDLC SERPL-MCNC: 34 MG/DL (ref 40–60)
HGB BLD-MCNC: 14.1 G/DL (ref 13–17.7)
IMM GRANULOCYTES # BLD AUTO: 0.04 10*3/MM3 (ref 0–0.05)
IMM GRANULOCYTES NFR BLD AUTO: 0.4 % (ref 0–0.5)
LDLC SERPL CALC-MCNC: 47 MG/DL (ref 0–100)
LDLC/HDLC SERPL: 1.18 {RATIO}
LYMPHOCYTES # BLD AUTO: 2.88 10*3/MM3 (ref 0.7–3.1)
LYMPHOCYTES NFR BLD AUTO: 25.9 % (ref 19.6–45.3)
MCH RBC QN AUTO: 29 PG (ref 26.6–33)
MCHC RBC AUTO-ENTMCNC: 33.3 G/DL (ref 31.5–35.7)
MCV RBC AUTO: 87.2 FL (ref 79–97)
MONOCYTES # BLD AUTO: 0.78 10*3/MM3 (ref 0.1–0.9)
MONOCYTES NFR BLD AUTO: 7 % (ref 5–12)
NEUTROPHILS # BLD AUTO: 6.78 10*3/MM3 (ref 1.7–7)
NEUTROPHILS NFR BLD AUTO: 61.1 % (ref 42.7–76)
NRBC BLD AUTO-RTO: 0.3 /100 WBC (ref 0–0.2)
PLATELET # BLD AUTO: 225 10*3/MM3 (ref 140–450)
POTASSIUM SERPL-SCNC: 3.7 MMOL/L (ref 3.5–5.2)
PROT SERPL-MCNC: 6.5 G/DL (ref 6–8.5)
RBC # BLD AUTO: 4.86 10*6/MM3 (ref 4.14–5.8)
SODIUM SERPL-SCNC: 144 MMOL/L (ref 136–145)
TRIGL SERPL-MCNC: 189 MG/DL (ref 0–150)
TSH SERPL DL<=0.005 MIU/L-ACNC: 3.03 UIU/ML (ref 0.27–4.2)
VLDLC SERPL CALC-MCNC: 31 MG/DL (ref 5–40)
WBC # BLD AUTO: 11.11 10*3/MM3 (ref 3.4–10.8)

## 2022-11-14 LAB
ENDOMYSIUM IGA SER QL: NEGATIVE
IGA SERPL-MCNC: 314 MG/DL (ref 61–437)
TTG IGA SER-ACNC: <2 U/ML (ref 0–3)

## 2022-11-17 ENCOUNTER — PATIENT MESSAGE (OUTPATIENT)
Dept: GASTROENTEROLOGY | Facility: CLINIC | Age: 77
End: 2022-11-17

## 2022-11-21 ENCOUNTER — OFFICE VISIT (OUTPATIENT)
Dept: INTERNAL MEDICINE | Facility: CLINIC | Age: 77
End: 2022-11-21

## 2022-11-21 VITALS
SYSTOLIC BLOOD PRESSURE: 130 MMHG | HEIGHT: 70 IN | WEIGHT: 237.6 LBS | OXYGEN SATURATION: 98 % | HEART RATE: 55 BPM | TEMPERATURE: 97.3 F | DIASTOLIC BLOOD PRESSURE: 68 MMHG | BODY MASS INDEX: 34.01 KG/M2

## 2022-11-21 DIAGNOSIS — I48.0 PAROXYSMAL ATRIAL FIBRILLATION: ICD-10-CM

## 2022-11-21 DIAGNOSIS — R19.7 DIARRHEA, UNSPECIFIED TYPE: ICD-10-CM

## 2022-11-21 DIAGNOSIS — E78.2 MIXED HYPERLIPIDEMIA: ICD-10-CM

## 2022-11-21 DIAGNOSIS — Z00.00 MEDICARE ANNUAL WELLNESS VISIT, SUBSEQUENT: ICD-10-CM

## 2022-11-21 DIAGNOSIS — Z23 NEED FOR INFLUENZA VACCINATION: ICD-10-CM

## 2022-11-21 DIAGNOSIS — E11.40 CONTROLLED TYPE 2 DIABETES MELLITUS WITH DIABETIC NEUROPATHY, WITHOUT LONG-TERM CURRENT USE OF INSULIN: Primary | ICD-10-CM

## 2022-11-21 DIAGNOSIS — I10 PRIMARY HYPERTENSION: ICD-10-CM

## 2022-11-21 PROCEDURE — G0439 PPPS, SUBSEQ VISIT: HCPCS | Performed by: INTERNAL MEDICINE

## 2022-11-21 PROCEDURE — 99213 OFFICE O/P EST LOW 20 MIN: CPT | Performed by: INTERNAL MEDICINE

## 2022-11-21 PROCEDURE — 1159F MED LIST DOCD IN RCRD: CPT | Performed by: INTERNAL MEDICINE

## 2022-11-21 PROCEDURE — 1170F FXNL STATUS ASSESSED: CPT | Performed by: INTERNAL MEDICINE

## 2022-11-21 PROCEDURE — G0008 ADMIN INFLUENZA VIRUS VAC: HCPCS | Performed by: INTERNAL MEDICINE

## 2022-11-21 PROCEDURE — 90662 IIV NO PRSV INCREASED AG IM: CPT | Performed by: INTERNAL MEDICINE

## 2022-11-21 PROCEDURE — 1125F AMNT PAIN NOTED PAIN PRSNT: CPT | Performed by: INTERNAL MEDICINE

## 2022-11-28 ENCOUNTER — TELEPHONE (OUTPATIENT)
Dept: NEUROLOGY | Facility: CLINIC | Age: 77
End: 2022-11-28

## 2022-11-30 RX ORDER — MEMANTINE HYDROCHLORIDE 10 MG/1
10 TABLET ORAL 2 TIMES DAILY
Qty: 60 TABLET | Refills: 2 | Status: SHIPPED | OUTPATIENT
Start: 2022-11-30 | End: 2022-12-09 | Stop reason: SDUPTHER

## 2022-12-09 ENCOUNTER — TELEPHONE (OUTPATIENT)
Dept: PHARMACY | Facility: HOSPITAL | Age: 77
End: 2022-12-09

## 2022-12-09 RX ORDER — MEMANTINE HYDROCHLORIDE 10 MG/1
10 TABLET ORAL 2 TIMES DAILY
Qty: 60 TABLET | Refills: 2 | Status: SHIPPED | OUTPATIENT
Start: 2022-12-09

## 2022-12-16 ENCOUNTER — ANTICOAGULATION VISIT (OUTPATIENT)
Dept: PHARMACY | Facility: HOSPITAL | Age: 77
End: 2022-12-16

## 2022-12-16 DIAGNOSIS — I48.0 PAROXYSMAL ATRIAL FIBRILLATION: Primary | ICD-10-CM

## 2022-12-16 LAB
INR PPP: 2.5 (ref 0.91–1.09)
PROTHROMBIN TIME: 30 SECONDS (ref 10–13.8)

## 2022-12-16 PROCEDURE — 85610 PROTHROMBIN TIME: CPT

## 2022-12-16 PROCEDURE — 36416 COLLJ CAPILLARY BLOOD SPEC: CPT

## 2023-01-13 ENCOUNTER — ANTICOAGULATION VISIT (OUTPATIENT)
Dept: PHARMACY | Facility: HOSPITAL | Age: 78
End: 2023-01-13
Payer: MEDICARE

## 2023-01-13 DIAGNOSIS — I48.0 PAROXYSMAL ATRIAL FIBRILLATION: Primary | ICD-10-CM

## 2023-01-13 LAB
INR PPP: 2.3 (ref 0.91–1.09)
PROTHROMBIN TIME: 28.1 SECONDS (ref 10–13.8)

## 2023-01-13 PROCEDURE — 85610 PROTHROMBIN TIME: CPT

## 2023-01-13 PROCEDURE — 36416 COLLJ CAPILLARY BLOOD SPEC: CPT

## 2023-01-18 RX ORDER — ATORVASTATIN CALCIUM 40 MG/1
TABLET, FILM COATED ORAL
Qty: 90 TABLET | Refills: 1 | Status: SHIPPED | OUTPATIENT
Start: 2023-01-18

## 2023-02-13 RX ORDER — WARFARIN SODIUM 5 MG/1
TABLET ORAL
Qty: 60 TABLET | Refills: 0 | Status: SHIPPED | OUTPATIENT
Start: 2023-02-13

## 2023-02-24 ENCOUNTER — ANTICOAGULATION VISIT (OUTPATIENT)
Dept: PHARMACY | Facility: HOSPITAL | Age: 78
End: 2023-02-24
Payer: MEDICARE

## 2023-02-24 DIAGNOSIS — I48.0 PAROXYSMAL ATRIAL FIBRILLATION: Primary | ICD-10-CM

## 2023-02-24 LAB
INR PPP: 2.7 (ref 0.91–1.09)
PROTHROMBIN TIME: 31.9 SECONDS (ref 10–13.8)

## 2023-02-24 PROCEDURE — 36416 COLLJ CAPILLARY BLOOD SPEC: CPT

## 2023-02-24 PROCEDURE — 85610 PROTHROMBIN TIME: CPT

## 2023-02-27 RX ORDER — POTASSIUM CHLORIDE 750 MG/1
TABLET, FILM COATED, EXTENDED RELEASE ORAL
Qty: 90 TABLET | Refills: 1 | Status: SHIPPED | OUTPATIENT
Start: 2023-02-27

## 2023-03-15 ENCOUNTER — TELEPHONE (OUTPATIENT)
Dept: NEUROLOGY | Facility: CLINIC | Age: 78
End: 2023-03-15
Payer: MEDICARE

## 2023-04-10 ENCOUNTER — ANTICOAGULATION VISIT (OUTPATIENT)
Dept: PHARMACY | Facility: HOSPITAL | Age: 78
End: 2023-04-10
Payer: MEDICARE

## 2023-04-10 DIAGNOSIS — I48.0 PAROXYSMAL ATRIAL FIBRILLATION: Primary | ICD-10-CM

## 2023-04-10 LAB
INR PPP: 2.1 (ref 0.91–1.09)
PROTHROMBIN TIME: 25.5 SECONDS (ref 10–13.8)

## 2023-04-10 PROCEDURE — 36416 COLLJ CAPILLARY BLOOD SPEC: CPT

## 2023-04-10 PROCEDURE — 85610 PROTHROMBIN TIME: CPT

## 2023-04-18 ENCOUNTER — OFFICE VISIT (OUTPATIENT)
Dept: ORTHOPEDIC SURGERY | Facility: CLINIC | Age: 78
End: 2023-04-18
Payer: MEDICARE

## 2023-04-18 VITALS — HEIGHT: 70 IN | BODY MASS INDEX: 33.36 KG/M2 | TEMPERATURE: 97.8 F | WEIGHT: 233 LBS

## 2023-04-18 DIAGNOSIS — M16.11 PRIMARY OSTEOARTHRITIS OF RIGHT HIP: ICD-10-CM

## 2023-04-18 DIAGNOSIS — R52 PAIN: Primary | ICD-10-CM

## 2023-04-19 ENCOUNTER — PATIENT ROUNDING (BHMG ONLY) (OUTPATIENT)
Dept: ORTHOPEDIC SURGERY | Facility: CLINIC | Age: 78
End: 2023-04-19
Payer: MEDICARE

## 2023-04-19 RX ORDER — LEVOTHYROXINE SODIUM 0.05 MG/1
TABLET ORAL
Qty: 90 TABLET | Refills: 1 | Status: SHIPPED | OUTPATIENT
Start: 2023-04-19

## 2023-04-27 ENCOUNTER — OFFICE VISIT (OUTPATIENT)
Dept: NEUROLOGY | Facility: CLINIC | Age: 78
End: 2023-04-27
Payer: MEDICARE

## 2023-04-27 VITALS
BODY MASS INDEX: 33.36 KG/M2 | HEART RATE: 56 BPM | DIASTOLIC BLOOD PRESSURE: 72 MMHG | OXYGEN SATURATION: 97 % | SYSTOLIC BLOOD PRESSURE: 130 MMHG | WEIGHT: 233 LBS | HEIGHT: 70 IN

## 2023-04-27 DIAGNOSIS — R26.89 IMPAIRMENT OF BALANCE: ICD-10-CM

## 2023-04-27 DIAGNOSIS — E11.42 DIABETIC PERIPHERAL NEUROPATHY: ICD-10-CM

## 2023-04-27 DIAGNOSIS — F01.50 VASCULAR DEMENTIA WITHOUT BEHAVIORAL DISTURBANCE, PSYCHOTIC DISTURBANCE, MOOD DISTURBANCE, OR ANXIETY, UNSPECIFIED DEMENTIA SEVERITY: Primary | ICD-10-CM

## 2023-04-27 PROCEDURE — 1160F RVW MEDS BY RX/DR IN RCRD: CPT | Performed by: PSYCHIATRY & NEUROLOGY

## 2023-04-27 PROCEDURE — 3075F SYST BP GE 130 - 139MM HG: CPT | Performed by: PSYCHIATRY & NEUROLOGY

## 2023-04-27 PROCEDURE — 99214 OFFICE O/P EST MOD 30 MIN: CPT | Performed by: PSYCHIATRY & NEUROLOGY

## 2023-04-27 PROCEDURE — 3078F DIAST BP <80 MM HG: CPT | Performed by: PSYCHIATRY & NEUROLOGY

## 2023-04-27 PROCEDURE — 1159F MED LIST DOCD IN RCRD: CPT | Performed by: PSYCHIATRY & NEUROLOGY

## 2023-05-08 RX ORDER — MONTELUKAST SODIUM 4 MG/1
TABLET, CHEWABLE ORAL
Qty: 60 TABLET | Refills: 5 | Status: SHIPPED | OUTPATIENT
Start: 2023-05-08

## 2023-05-12 DIAGNOSIS — E11.40 CONTROLLED TYPE 2 DIABETES MELLITUS WITH DIABETIC NEUROPATHY, WITHOUT LONG-TERM CURRENT USE OF INSULIN: ICD-10-CM

## 2023-05-12 DIAGNOSIS — I48.0 PAROXYSMAL ATRIAL FIBRILLATION: ICD-10-CM

## 2023-05-12 DIAGNOSIS — I10 PRIMARY HYPERTENSION: ICD-10-CM

## 2023-05-12 DIAGNOSIS — E78.2 MIXED HYPERLIPIDEMIA: ICD-10-CM

## 2023-05-12 RX ORDER — WARFARIN SODIUM 5 MG/1
TABLET ORAL
Qty: 60 TABLET | Refills: 0 | Status: SHIPPED | OUTPATIENT
Start: 2023-05-12

## 2023-05-16 LAB
ALBUMIN SERPL-MCNC: 4.3 G/DL (ref 3.5–5.2)
ALBUMIN/GLOB SERPL: 1.7 G/DL
ALP SERPL-CCNC: 120 U/L (ref 39–117)
ALT SERPL-CCNC: 17 U/L (ref 1–41)
AST SERPL-CCNC: 15 U/L (ref 1–40)
BASOPHILS # BLD AUTO: 0.04 10*3/MM3 (ref 0–0.2)
BASOPHILS NFR BLD AUTO: 0.4 % (ref 0–1.5)
BILIRUB SERPL-MCNC: 0.5 MG/DL (ref 0–1.2)
BUN SERPL-MCNC: 15 MG/DL (ref 8–23)
BUN/CREAT SERPL: 13.2 (ref 7–25)
CALCIUM SERPL-MCNC: 8.8 MG/DL (ref 8.6–10.5)
CHLORIDE SERPL-SCNC: 103 MMOL/L (ref 98–107)
CHOLEST SERPL-MCNC: 114 MG/DL (ref 0–200)
CO2 SERPL-SCNC: 29 MMOL/L (ref 22–29)
CREAT SERPL-MCNC: 1.14 MG/DL (ref 0.76–1.27)
EGFRCR SERPLBLD CKD-EPI 2021: 66.7 ML/MIN/1.73
EOSINOPHIL # BLD AUTO: 0.44 10*3/MM3 (ref 0–0.4)
EOSINOPHIL NFR BLD AUTO: 4 % (ref 0.3–6.2)
ERYTHROCYTE [DISTWIDTH] IN BLOOD BY AUTOMATED COUNT: 13.4 % (ref 12.3–15.4)
GLOBULIN SER CALC-MCNC: 2.5 GM/DL
GLUCOSE SERPL-MCNC: 118 MG/DL (ref 65–99)
HBA1C MFR BLD: 6.5 % (ref 4.8–5.6)
HCT VFR BLD AUTO: 44 % (ref 37.5–51)
HDLC SERPL-MCNC: 36 MG/DL (ref 40–60)
HGB BLD-MCNC: 14.2 G/DL (ref 13–17.7)
IMM GRANULOCYTES # BLD AUTO: 0.06 10*3/MM3 (ref 0–0.05)
IMM GRANULOCYTES NFR BLD AUTO: 0.5 % (ref 0–0.5)
LDLC SERPL CALC-MCNC: 48 MG/DL (ref 0–100)
LDLC/HDLC SERPL: 1.18 {RATIO}
LYMPHOCYTES # BLD AUTO: 2.99 10*3/MM3 (ref 0.7–3.1)
LYMPHOCYTES NFR BLD AUTO: 27.1 % (ref 19.6–45.3)
MCH RBC QN AUTO: 27.4 PG (ref 26.6–33)
MCHC RBC AUTO-ENTMCNC: 32.3 G/DL (ref 31.5–35.7)
MCV RBC AUTO: 84.9 FL (ref 79–97)
MONOCYTES # BLD AUTO: 0.8 10*3/MM3 (ref 0.1–0.9)
MONOCYTES NFR BLD AUTO: 7.2 % (ref 5–12)
NEUTROPHILS # BLD AUTO: 6.72 10*3/MM3 (ref 1.7–7)
NEUTROPHILS NFR BLD AUTO: 60.8 % (ref 42.7–76)
NRBC BLD AUTO-RTO: 0.1 /100 WBC (ref 0–0.2)
PLATELET # BLD AUTO: 253 10*3/MM3 (ref 140–450)
POTASSIUM SERPL-SCNC: 3.9 MMOL/L (ref 3.5–5.2)
PROT SERPL-MCNC: 6.8 G/DL (ref 6–8.5)
RBC # BLD AUTO: 5.18 10*6/MM3 (ref 4.14–5.8)
SODIUM SERPL-SCNC: 147 MMOL/L (ref 136–145)
TRIGL SERPL-MCNC: 178 MG/DL (ref 0–150)
TSH SERPL DL<=0.005 MIU/L-ACNC: 1.89 UIU/ML (ref 0.27–4.2)
VLDLC SERPL CALC-MCNC: 30 MG/DL (ref 5–40)
WBC # BLD AUTO: 11.05 10*3/MM3 (ref 3.4–10.8)

## 2023-05-22 ENCOUNTER — ANTICOAGULATION VISIT (OUTPATIENT)
Dept: PHARMACY | Facility: HOSPITAL | Age: 78
End: 2023-05-22
Payer: MEDICARE

## 2023-05-22 ENCOUNTER — OFFICE VISIT (OUTPATIENT)
Dept: INTERNAL MEDICINE | Facility: CLINIC | Age: 78
End: 2023-05-22
Payer: MEDICARE

## 2023-05-22 VITALS
HEIGHT: 70 IN | DIASTOLIC BLOOD PRESSURE: 72 MMHG | SYSTOLIC BLOOD PRESSURE: 116 MMHG | OXYGEN SATURATION: 98 % | HEART RATE: 53 BPM | WEIGHT: 233.4 LBS | TEMPERATURE: 97.7 F | BODY MASS INDEX: 33.41 KG/M2

## 2023-05-22 DIAGNOSIS — E11.40 CONTROLLED TYPE 2 DIABETES MELLITUS WITH DIABETIC NEUROPATHY, WITHOUT LONG-TERM CURRENT USE OF INSULIN: Primary | ICD-10-CM

## 2023-05-22 DIAGNOSIS — I10 PRIMARY HYPERTENSION: ICD-10-CM

## 2023-05-22 DIAGNOSIS — G62.9 NEUROPATHY: ICD-10-CM

## 2023-05-22 DIAGNOSIS — E78.2 MIXED HYPERLIPIDEMIA: ICD-10-CM

## 2023-05-22 DIAGNOSIS — I48.0 PAROXYSMAL ATRIAL FIBRILLATION: Primary | ICD-10-CM

## 2023-05-22 LAB
INR PPP: 2.5 (ref 0.91–1.09)
PROTHROMBIN TIME: 30.1 SECONDS (ref 10–13.8)

## 2023-05-22 PROCEDURE — 1159F MED LIST DOCD IN RCRD: CPT | Performed by: INTERNAL MEDICINE

## 2023-05-22 PROCEDURE — 36416 COLLJ CAPILLARY BLOOD SPEC: CPT

## 2023-05-22 PROCEDURE — 99214 OFFICE O/P EST MOD 30 MIN: CPT | Performed by: INTERNAL MEDICINE

## 2023-05-22 PROCEDURE — 3078F DIAST BP <80 MM HG: CPT | Performed by: INTERNAL MEDICINE

## 2023-05-22 PROCEDURE — 3074F SYST BP LT 130 MM HG: CPT | Performed by: INTERNAL MEDICINE

## 2023-05-22 PROCEDURE — 1160F RVW MEDS BY RX/DR IN RCRD: CPT | Performed by: INTERNAL MEDICINE

## 2023-05-22 PROCEDURE — 85610 PROTHROMBIN TIME: CPT

## 2023-05-30 ENCOUNTER — HOSPITAL ENCOUNTER (OUTPATIENT)
Dept: GENERAL RADIOLOGY | Facility: HOSPITAL | Age: 78
Discharge: HOME | End: 2023-05-30

## 2023-05-30 DIAGNOSIS — M16.11 PRIMARY OSTEOARTHRITIS OF RIGHT HIP: ICD-10-CM

## 2023-05-30 PROCEDURE — 77002 NEEDLE LOCALIZATION BY XRAY: CPT

## 2023-05-30 PROCEDURE — 0 LIDOCAINE 1 % SOLUTION: Performed by: NURSE PRACTITIONER

## 2023-05-30 PROCEDURE — 25010000002 METHYLPREDNISOLONE PER 80 MG: Performed by: NURSE PRACTITIONER

## 2023-05-30 RX ORDER — LIDOCAINE HYDROCHLORIDE 10 MG/ML
10 INJECTION, SOLUTION INFILTRATION; PERINEURAL
Status: COMPLETED | OUTPATIENT
Start: 2023-05-30 | End: 2023-05-30

## 2023-05-30 RX ORDER — BUPIVACAINE HYDROCHLORIDE 2.5 MG/ML
10 INJECTION, SOLUTION EPIDURAL; INFILTRATION; INTRACAUDAL
Status: COMPLETED | OUTPATIENT
Start: 2023-05-30 | End: 2023-05-30

## 2023-05-30 RX ORDER — METHYLPREDNISOLONE ACETATE 80 MG/ML
80 INJECTION, SUSPENSION INTRA-ARTICULAR; INTRALESIONAL; INTRAMUSCULAR; SOFT TISSUE ONCE
Status: COMPLETED | OUTPATIENT
Start: 2023-05-30 | End: 2023-05-30

## 2023-05-30 RX ADMIN — LIDOCAINE HYDROCHLORIDE 8 ML: 10 INJECTION, SOLUTION INFILTRATION; PERINEURAL at 10:50

## 2023-05-30 RX ADMIN — BUPIVACAINE HYDROCHLORIDE 5 ML: 2.5 INJECTION, SOLUTION EPIDURAL; INFILTRATION; INTRACAUDAL; PERINEURAL at 10:50

## 2023-05-30 RX ADMIN — METHYLPREDNISOLONE ACETATE 80 MG: 80 INJECTION, SUSPENSION INTRA-ARTICULAR; INTRALESIONAL; INTRAMUSCULAR; SOFT TISSUE at 10:50

## 2023-06-06 ENCOUNTER — OFFICE VISIT (OUTPATIENT)
Dept: ORTHOPEDIC SURGERY | Facility: CLINIC | Age: 78
End: 2023-06-06
Payer: MEDICARE

## 2023-06-06 VITALS — HEIGHT: 71 IN | TEMPERATURE: 96.9 F | WEIGHT: 230.6 LBS | BODY MASS INDEX: 32.28 KG/M2

## 2023-06-06 DIAGNOSIS — M79.605 PAIN IN BOTH LOWER EXTREMITIES: Primary | ICD-10-CM

## 2023-06-06 DIAGNOSIS — M79.604 PAIN IN BOTH LOWER EXTREMITIES: Primary | ICD-10-CM

## 2023-06-06 PROCEDURE — 1159F MED LIST DOCD IN RCRD: CPT | Performed by: ORTHOPAEDIC SURGERY

## 2023-06-06 PROCEDURE — 99213 OFFICE O/P EST LOW 20 MIN: CPT | Performed by: ORTHOPAEDIC SURGERY

## 2023-06-06 PROCEDURE — 1160F RVW MEDS BY RX/DR IN RCRD: CPT | Performed by: ORTHOPAEDIC SURGERY

## 2023-07-26 RX ORDER — MEMANTINE HYDROCHLORIDE 10 MG/1
TABLET ORAL
Qty: 60 TABLET | Refills: 2 | Status: SHIPPED | OUTPATIENT
Start: 2023-07-26

## 2023-07-28 ENCOUNTER — PREP FOR SURGERY (OUTPATIENT)
Dept: SURGERY | Facility: SURGERY CENTER | Age: 78
End: 2023-07-28
Payer: MEDICARE

## 2023-07-28 ENCOUNTER — OFFICE VISIT (OUTPATIENT)
Dept: GASTROENTEROLOGY | Facility: CLINIC | Age: 78
End: 2023-07-28
Payer: MEDICARE

## 2023-07-28 VITALS
DIASTOLIC BLOOD PRESSURE: 74 MMHG | HEIGHT: 71 IN | BODY MASS INDEX: 32.34 KG/M2 | TEMPERATURE: 96.9 F | HEART RATE: 68 BPM | OXYGEN SATURATION: 96 % | WEIGHT: 231 LBS | SYSTOLIC BLOOD PRESSURE: 120 MMHG

## 2023-07-28 DIAGNOSIS — R19.7 DIARRHEA, UNSPECIFIED TYPE: Primary | ICD-10-CM

## 2023-07-28 DIAGNOSIS — Z86.010 HISTORY OF COLONIC POLYPS: ICD-10-CM

## 2023-07-28 PROBLEM — Z86.0100 HISTORY OF COLONIC POLYPS: Status: ACTIVE | Noted: 2023-07-28

## 2023-07-28 PROCEDURE — 99214 OFFICE O/P EST MOD 30 MIN: CPT | Performed by: NURSE PRACTITIONER

## 2023-07-28 PROCEDURE — 3074F SYST BP LT 130 MM HG: CPT | Performed by: NURSE PRACTITIONER

## 2023-07-28 PROCEDURE — 1160F RVW MEDS BY RX/DR IN RCRD: CPT | Performed by: NURSE PRACTITIONER

## 2023-07-28 PROCEDURE — 1159F MED LIST DOCD IN RCRD: CPT | Performed by: NURSE PRACTITIONER

## 2023-07-28 PROCEDURE — 3078F DIAST BP <80 MM HG: CPT | Performed by: NURSE PRACTITIONER

## 2023-07-28 RX ORDER — SODIUM CHLORIDE, SODIUM LACTATE, POTASSIUM CHLORIDE, CALCIUM CHLORIDE 600; 310; 30; 20 MG/100ML; MG/100ML; MG/100ML; MG/100ML
30 INJECTION, SOLUTION INTRAVENOUS CONTINUOUS PRN
OUTPATIENT
Start: 2023-07-28

## 2023-07-28 RX ORDER — SODIUM CHLORIDE 0.9 % (FLUSH) 0.9 %
10 SYRINGE (ML) INJECTION AS NEEDED
OUTPATIENT
Start: 2023-07-28

## 2023-07-28 RX ORDER — SODIUM CHLORIDE 0.9 % (FLUSH) 0.9 %
3 SYRINGE (ML) INJECTION EVERY 12 HOURS SCHEDULED
OUTPATIENT
Start: 2023-07-28

## 2023-07-28 RX ORDER — MONTELUKAST SODIUM 4 MG/1
2 TABLET, CHEWABLE ORAL 2 TIMES DAILY
Qty: 120 TABLET | Refills: 5 | Status: SHIPPED | OUTPATIENT
Start: 2023-07-28

## 2023-08-09 ENCOUNTER — OFFICE VISIT (OUTPATIENT)
Dept: CARDIOLOGY | Facility: CLINIC | Age: 78
End: 2023-08-09
Payer: MEDICARE

## 2023-08-09 VITALS
HEIGHT: 71 IN | HEART RATE: 57 BPM | SYSTOLIC BLOOD PRESSURE: 130 MMHG | WEIGHT: 230 LBS | DIASTOLIC BLOOD PRESSURE: 82 MMHG | BODY MASS INDEX: 32.2 KG/M2

## 2023-08-09 DIAGNOSIS — I10 PRIMARY HYPERTENSION: ICD-10-CM

## 2023-08-09 DIAGNOSIS — Z86.73 HISTORY OF STROKE: ICD-10-CM

## 2023-08-09 DIAGNOSIS — I48.0 PAROXYSMAL ATRIAL FIBRILLATION: Primary | ICD-10-CM

## 2023-08-09 PROCEDURE — 99214 OFFICE O/P EST MOD 30 MIN: CPT | Performed by: INTERNAL MEDICINE

## 2023-08-09 PROCEDURE — 93000 ELECTROCARDIOGRAM COMPLETE: CPT | Performed by: INTERNAL MEDICINE

## 2023-08-09 PROCEDURE — 3079F DIAST BP 80-89 MM HG: CPT | Performed by: INTERNAL MEDICINE

## 2023-08-09 PROCEDURE — 3075F SYST BP GE 130 - 139MM HG: CPT | Performed by: INTERNAL MEDICINE

## 2023-08-11 RX ORDER — WARFARIN SODIUM 5 MG/1
TABLET ORAL
Qty: 60 TABLET | Refills: 0 | Status: SHIPPED | OUTPATIENT
Start: 2023-08-11

## 2023-08-21 RX ORDER — LEVOTHYROXINE SODIUM 0.05 MG/1
TABLET ORAL
Qty: 90 TABLET | Refills: 1 | OUTPATIENT
Start: 2023-08-21

## 2023-08-22 DIAGNOSIS — F32.1 CURRENT MODERATE EPISODE OF MAJOR DEPRESSIVE DISORDER, UNSPECIFIED WHETHER RECURRENT: Primary | ICD-10-CM

## 2023-08-22 RX ORDER — FLUOXETINE 10 MG/1
10 CAPSULE ORAL DAILY
Qty: 30 CAPSULE | Refills: 0 | Status: SHIPPED | OUTPATIENT
Start: 2023-08-22 | End: 2023-08-28 | Stop reason: SDUPTHER

## 2023-08-25 ENCOUNTER — ANTICOAGULATION VISIT (OUTPATIENT)
Dept: PHARMACY | Facility: HOSPITAL | Age: 78
End: 2023-08-25
Payer: MEDICARE

## 2023-08-25 DIAGNOSIS — I48.0 PAROXYSMAL ATRIAL FIBRILLATION: Primary | ICD-10-CM

## 2023-08-25 LAB
INR PPP: 3.7 (ref 0.91–1.09)
PROTHROMBIN TIME: 44.8 SECONDS (ref 10–13.8)

## 2023-08-25 PROCEDURE — G0463 HOSPITAL OUTPT CLINIC VISIT: HCPCS

## 2023-08-25 PROCEDURE — 85610 PROTHROMBIN TIME: CPT

## 2023-08-25 PROCEDURE — 36416 COLLJ CAPILLARY BLOOD SPEC: CPT

## 2023-08-28 DIAGNOSIS — F32.1 CURRENT MODERATE EPISODE OF MAJOR DEPRESSIVE DISORDER, UNSPECIFIED WHETHER RECURRENT: ICD-10-CM

## 2023-08-28 RX ORDER — FLUOXETINE 10 MG/1
10 CAPSULE ORAL DAILY
Qty: 30 CAPSULE | Refills: 0 | Status: SHIPPED | OUTPATIENT
Start: 2023-08-28 | End: 2023-09-27

## 2023-08-28 RX ORDER — POTASSIUM CHLORIDE 750 MG/1
TABLET, FILM COATED, EXTENDED RELEASE ORAL
Qty: 90 TABLET | Refills: 1 | Status: SHIPPED | OUTPATIENT
Start: 2023-08-28

## 2023-09-08 ENCOUNTER — ANTICOAGULATION VISIT (OUTPATIENT)
Dept: PHARMACY | Facility: HOSPITAL | Age: 78
End: 2023-09-08
Payer: MEDICARE

## 2023-09-08 DIAGNOSIS — I48.0 PAROXYSMAL ATRIAL FIBRILLATION: Primary | ICD-10-CM

## 2023-09-08 LAB
INR PPP: 3.2 (ref 0.91–1.09)
PROTHROMBIN TIME: 38 SECONDS (ref 10–13.8)

## 2023-09-08 PROCEDURE — 85610 PROTHROMBIN TIME: CPT

## 2023-09-08 PROCEDURE — 36416 COLLJ CAPILLARY BLOOD SPEC: CPT

## 2023-09-08 PROCEDURE — G0463 HOSPITAL OUTPT CLINIC VISIT: HCPCS

## 2023-09-20 ENCOUNTER — PATIENT MESSAGE (OUTPATIENT)
Dept: GASTROENTEROLOGY | Facility: CLINIC | Age: 78
End: 2023-09-20
Payer: MEDICARE

## 2023-09-20 DIAGNOSIS — R19.7 DIARRHEA, UNSPECIFIED TYPE: Primary | ICD-10-CM

## 2023-09-20 RX ORDER — DIPHENOXYLATE HYDROCHLORIDE AND ATROPINE SULFATE 2.5; .025 MG/1; MG/1
1 TABLET ORAL 4 TIMES DAILY PRN
Qty: 120 TABLET | Refills: 2 | Status: SHIPPED | OUTPATIENT
Start: 2023-09-20

## 2023-09-22 ENCOUNTER — ANTICOAGULATION VISIT (OUTPATIENT)
Dept: PHARMACY | Facility: HOSPITAL | Age: 78
End: 2023-09-22
Payer: MEDICARE

## 2023-09-22 DIAGNOSIS — I48.0 PAROXYSMAL ATRIAL FIBRILLATION: Primary | ICD-10-CM

## 2023-09-22 LAB
INR PPP: 7.3 (ref 0.91–1.09)
PROTHROMBIN TIME: 87.9 SECONDS (ref 10–13.8)

## 2023-09-22 PROCEDURE — G0463 HOSPITAL OUTPT CLINIC VISIT: HCPCS

## 2023-09-22 PROCEDURE — 85610 PROTHROMBIN TIME: CPT

## 2023-09-22 PROCEDURE — 36416 COLLJ CAPILLARY BLOOD SPEC: CPT

## 2023-09-25 ENCOUNTER — ANTICOAGULATION VISIT (OUTPATIENT)
Dept: PHARMACY | Facility: HOSPITAL | Age: 78
End: 2023-09-25

## 2023-09-25 DIAGNOSIS — I48.0 PAROXYSMAL ATRIAL FIBRILLATION: Primary | ICD-10-CM

## 2023-09-25 LAB
INR PPP: 2.6 (ref 0.91–1.09)
PROTHROMBIN TIME: 30.8 SECONDS (ref 10–13.8)

## 2023-09-25 PROCEDURE — G0463 HOSPITAL OUTPT CLINIC VISIT: HCPCS

## 2023-09-25 PROCEDURE — 85610 PROTHROMBIN TIME: CPT

## 2023-09-25 PROCEDURE — 36416 COLLJ CAPILLARY BLOOD SPEC: CPT

## 2023-09-25 RX ORDER — ENOXAPARIN SODIUM 100 MG/ML
INJECTION SUBCUTANEOUS
Qty: 20 ML | Refills: 0 | Status: SHIPPED | OUTPATIENT
Start: 2023-09-25

## 2023-10-03 ENCOUNTER — TELEPHONE (OUTPATIENT)
Dept: CARDIOLOGY | Facility: CLINIC | Age: 78
End: 2023-10-03
Payer: MEDICARE

## 2023-10-11 ENCOUNTER — HOSPITAL ENCOUNTER (OUTPATIENT)
Facility: SURGERY CENTER | Age: 78
Setting detail: HOSPITAL OUTPATIENT SURGERY
Discharge: HOME | End: 2023-10-11
Attending: INTERNAL MEDICINE | Admitting: INTERNAL MEDICINE
Payer: MEDICARE

## 2023-10-11 ENCOUNTER — ANESTHESIA (OUTPATIENT)
Dept: SURGERY | Facility: SURGERY CENTER | Age: 78
End: 2023-10-11
Payer: MEDICARE

## 2023-10-11 ENCOUNTER — ANESTHESIA EVENT (OUTPATIENT)
Dept: SURGERY | Facility: SURGERY CENTER | Age: 78
End: 2023-10-11
Payer: MEDICARE

## 2023-10-11 VITALS
SYSTOLIC BLOOD PRESSURE: 121 MMHG | BODY MASS INDEX: 29.36 KG/M2 | TEMPERATURE: 98 F | WEIGHT: 216.8 LBS | RESPIRATION RATE: 16 BRPM | HEIGHT: 72 IN | HEART RATE: 57 BPM | OXYGEN SATURATION: 95 % | DIASTOLIC BLOOD PRESSURE: 83 MMHG

## 2023-10-11 DIAGNOSIS — R19.7 DIARRHEA, UNSPECIFIED TYPE: ICD-10-CM

## 2023-10-11 DIAGNOSIS — Z86.010 HISTORY OF COLONIC POLYPS: ICD-10-CM

## 2023-10-11 PROCEDURE — 45381 COLONOSCOPY SUBMUCOUS NJX: CPT | Performed by: INTERNAL MEDICINE

## 2023-10-11 PROCEDURE — 88305 TISSUE EXAM BY PATHOLOGIST: CPT | Performed by: INTERNAL MEDICINE

## 2023-10-11 PROCEDURE — 25010000002 PROPOFOL 1000 MG/100ML EMULSION: Performed by: ANESTHESIOLOGY

## 2023-10-11 PROCEDURE — 25810000003 LACTATED RINGERS PER 1000 ML: Performed by: NURSE PRACTITIONER

## 2023-10-11 PROCEDURE — 25010000002 PROPOFOL 10 MG/ML EMULSION: Performed by: ANESTHESIOLOGY

## 2023-10-11 PROCEDURE — 45380 COLONOSCOPY AND BIOPSY: CPT | Performed by: INTERNAL MEDICINE

## 2023-10-11 PROCEDURE — 45385 COLONOSCOPY W/LESION REMOVAL: CPT | Performed by: INTERNAL MEDICINE

## 2023-10-11 DEVICE — REPLAY HEMOSTASIS CLIP, 16MM SPAN
Type: IMPLANTABLE DEVICE | Site: DESCENDING COLON | Status: FUNCTIONAL
Brand: REPLAY

## 2023-10-11 RX ORDER — SODIUM CHLORIDE, SODIUM LACTATE, POTASSIUM CHLORIDE, CALCIUM CHLORIDE 600; 310; 30; 20 MG/100ML; MG/100ML; MG/100ML; MG/100ML
30 INJECTION, SOLUTION INTRAVENOUS CONTINUOUS PRN
Status: DISCONTINUED | OUTPATIENT
Start: 2023-10-11 | End: 2023-10-11 | Stop reason: HOSPADM

## 2023-10-11 RX ORDER — SODIUM CHLORIDE 0.9 % (FLUSH) 0.9 %
3 SYRINGE (ML) INJECTION EVERY 12 HOURS SCHEDULED
Status: DISCONTINUED | OUTPATIENT
Start: 2023-10-11 | End: 2023-10-11 | Stop reason: HOSPADM

## 2023-10-11 RX ORDER — SODIUM CHLORIDE 0.9 % (FLUSH) 0.9 %
10 SYRINGE (ML) INJECTION AS NEEDED
Status: DISCONTINUED | OUTPATIENT
Start: 2023-10-11 | End: 2023-10-11 | Stop reason: HOSPADM

## 2023-10-11 RX ORDER — MAGNESIUM HYDROXIDE 1200 MG/15ML
LIQUID ORAL AS NEEDED
Status: DISCONTINUED | OUTPATIENT
Start: 2023-10-11 | End: 2023-10-11 | Stop reason: HOSPADM

## 2023-10-11 RX ORDER — PROPOFOL 10 MG/ML
INJECTION, EMULSION INTRAVENOUS AS NEEDED
Status: DISCONTINUED | OUTPATIENT
Start: 2023-10-11 | End: 2023-10-11 | Stop reason: SURG

## 2023-10-11 RX ORDER — LIDOCAINE HYDROCHLORIDE 20 MG/ML
INJECTION, SOLUTION INFILTRATION; PERINEURAL AS NEEDED
Status: DISCONTINUED | OUTPATIENT
Start: 2023-10-11 | End: 2023-10-11 | Stop reason: SURG

## 2023-10-11 RX ADMIN — PROPOFOL INJECTABLE EMULSION 160 MCG/KG/MIN: 10 INJECTION, EMULSION INTRAVENOUS at 10:26

## 2023-10-11 RX ADMIN — SODIUM CHLORIDE, POTASSIUM CHLORIDE, SODIUM LACTATE AND CALCIUM CHLORIDE 30 ML/HR: 600; 310; 30; 20 INJECTION, SOLUTION INTRAVENOUS at 10:04

## 2023-10-11 RX ADMIN — PROPOFOL 100 MG: 10 INJECTION, EMULSION INTRAVENOUS at 10:26

## 2023-10-11 RX ADMIN — LIDOCAINE HYDROCHLORIDE 40 MG: 20 INJECTION, SOLUTION INFILTRATION; PERINEURAL at 10:26

## 2023-10-12 LAB
LAB AP CASE REPORT: NORMAL
LAB AP CLINICAL INFORMATION: NORMAL
PATH REPORT.FINAL DX SPEC: NORMAL
PATH REPORT.GROSS SPEC: NORMAL

## 2023-10-16 RX ORDER — PANTOPRAZOLE SODIUM 40 MG/1
TABLET, DELAYED RELEASE ORAL
Qty: 90 TABLET | Refills: 1 | Status: SHIPPED | OUTPATIENT
Start: 2023-10-16

## 2023-10-16 RX ORDER — LEVOTHYROXINE SODIUM 0.05 MG/1
TABLET ORAL
Qty: 90 TABLET | Refills: 1 | Status: SHIPPED | OUTPATIENT
Start: 2023-10-16

## 2023-10-16 RX ORDER — WARFARIN SODIUM 5 MG/1
TABLET ORAL
Qty: 60 TABLET | Refills: 0 | Status: SHIPPED | OUTPATIENT
Start: 2023-10-16

## 2023-10-17 ENCOUNTER — ANTICOAGULATION VISIT (OUTPATIENT)
Dept: PHARMACY | Facility: HOSPITAL | Age: 78
End: 2023-10-17
Payer: MEDICARE

## 2023-10-17 DIAGNOSIS — I48.0 PAROXYSMAL ATRIAL FIBRILLATION: Primary | ICD-10-CM

## 2023-10-17 LAB
INR PPP: 3 (ref 0.91–1.09)
PROTHROMBIN TIME: 35.4 SECONDS (ref 10–13.8)

## 2023-10-17 PROCEDURE — 36416 COLLJ CAPILLARY BLOOD SPEC: CPT

## 2023-10-17 PROCEDURE — 85610 PROTHROMBIN TIME: CPT

## 2023-10-19 RX ORDER — DONEPEZIL HYDROCHLORIDE 10 MG/1
TABLET, FILM COATED ORAL
Qty: 90 TABLET | Refills: 3 | Status: SHIPPED | OUTPATIENT
Start: 2023-10-19

## 2023-10-31 ENCOUNTER — ANTICOAGULATION VISIT (OUTPATIENT)
Dept: PHARMACY | Facility: HOSPITAL | Age: 78
End: 2023-10-31
Payer: MEDICARE

## 2023-10-31 DIAGNOSIS — I48.0 PAROXYSMAL ATRIAL FIBRILLATION: Primary | ICD-10-CM

## 2023-10-31 LAB
INR PPP: 3.4 (ref 0.91–1.09)
PROTHROMBIN TIME: 40.9 SECONDS (ref 10–13.8)

## 2023-10-31 PROCEDURE — 36416 COLLJ CAPILLARY BLOOD SPEC: CPT

## 2023-10-31 PROCEDURE — 85610 PROTHROMBIN TIME: CPT

## 2023-10-31 PROCEDURE — G0463 HOSPITAL OUTPT CLINIC VISIT: HCPCS

## 2023-10-31 RX ORDER — METOPROLOL SUCCINATE 25 MG/1
TABLET, EXTENDED RELEASE ORAL
Qty: 45 TABLET | Refills: 0 | Status: SHIPPED | OUTPATIENT
Start: 2023-10-31

## 2023-11-10 DIAGNOSIS — E78.2 MIXED HYPERLIPIDEMIA: ICD-10-CM

## 2023-11-10 DIAGNOSIS — G62.9 NEUROPATHY: ICD-10-CM

## 2023-11-10 DIAGNOSIS — I10 PRIMARY HYPERTENSION: ICD-10-CM

## 2023-11-10 DIAGNOSIS — E11.40 CONTROLLED TYPE 2 DIABETES MELLITUS WITH DIABETIC NEUROPATHY, WITHOUT LONG-TERM CURRENT USE OF INSULIN: ICD-10-CM

## 2023-11-13 RX ORDER — MEMANTINE HYDROCHLORIDE 10 MG/1
10 TABLET ORAL 2 TIMES DAILY
Qty: 60 TABLET | Refills: 2 | Status: SHIPPED | OUTPATIENT
Start: 2023-11-13

## 2023-11-14 ENCOUNTER — TELEPHONE (OUTPATIENT)
Dept: INTERNAL MEDICINE | Facility: CLINIC | Age: 78
End: 2023-11-14
Payer: MEDICARE

## 2023-11-14 LAB
ALBUMIN SERPL-MCNC: 3.9 G/DL (ref 3.5–5.2)
ALBUMIN/GLOB SERPL: 1.5 G/DL
ALP SERPL-CCNC: 105 U/L (ref 39–117)
ALT SERPL-CCNC: 18 U/L (ref 1–41)
AST SERPL-CCNC: 17 U/L (ref 1–40)
BASOPHILS # BLD AUTO: 0.04 10*3/MM3 (ref 0–0.2)
BASOPHILS NFR BLD AUTO: 0.3 % (ref 0–1.5)
BILIRUB SERPL-MCNC: 0.6 MG/DL (ref 0–1.2)
BUN SERPL-MCNC: 12 MG/DL (ref 8–23)
BUN/CREAT SERPL: 10.9 (ref 7–25)
CALCIUM SERPL-MCNC: 6.8 MG/DL (ref 8.6–10.5)
CHLORIDE SERPL-SCNC: 99 MMOL/L (ref 98–107)
CHOLEST SERPL-MCNC: 87 MG/DL (ref 0–200)
CO2 SERPL-SCNC: 31.6 MMOL/L (ref 22–29)
CREAT SERPL-MCNC: 1.1 MG/DL (ref 0.76–1.27)
EGFRCR SERPLBLD CKD-EPI 2021: 69.6 ML/MIN/1.73
EOSINOPHIL # BLD AUTO: 0.56 10*3/MM3 (ref 0–0.4)
EOSINOPHIL NFR BLD AUTO: 4.4 % (ref 0.3–6.2)
ERYTHROCYTE [DISTWIDTH] IN BLOOD BY AUTOMATED COUNT: 13.5 % (ref 12.3–15.4)
GLOBULIN SER CALC-MCNC: 2.6 GM/DL
GLUCOSE SERPL-MCNC: 118 MG/DL (ref 65–99)
HBA1C MFR BLD: 6.3 % (ref 4.8–5.6)
HCT VFR BLD AUTO: 39.9 % (ref 37.5–51)
HDLC SERPL-MCNC: 34 MG/DL (ref 40–60)
HGB BLD-MCNC: 13.3 G/DL (ref 13–17.7)
IMM GRANULOCYTES # BLD AUTO: 0.07 10*3/MM3 (ref 0–0.05)
IMM GRANULOCYTES NFR BLD AUTO: 0.6 % (ref 0–0.5)
LDLC SERPL CALC-MCNC: 30 MG/DL (ref 0–100)
LDLC/HDLC SERPL: 0.78 {RATIO}
LYMPHOCYTES # BLD AUTO: 3.11 10*3/MM3 (ref 0.7–3.1)
LYMPHOCYTES NFR BLD AUTO: 24.5 % (ref 19.6–45.3)
MCH RBC QN AUTO: 28.4 PG (ref 26.6–33)
MCHC RBC AUTO-ENTMCNC: 33.3 G/DL (ref 31.5–35.7)
MCV RBC AUTO: 85.3 FL (ref 79–97)
MONOCYTES # BLD AUTO: 0.98 10*3/MM3 (ref 0.1–0.9)
MONOCYTES NFR BLD AUTO: 7.7 % (ref 5–12)
NEUTROPHILS # BLD AUTO: 7.92 10*3/MM3 (ref 1.7–7)
NEUTROPHILS NFR BLD AUTO: 62.5 % (ref 42.7–76)
NRBC BLD AUTO-RTO: 0 /100 WBC (ref 0–0.2)
PLATELET # BLD AUTO: 274 10*3/MM3 (ref 140–450)
POTASSIUM SERPL-SCNC: 3.1 MMOL/L (ref 3.5–5.2)
PROT SERPL-MCNC: 6.5 G/DL (ref 6–8.5)
RBC # BLD AUTO: 4.68 10*6/MM3 (ref 4.14–5.8)
SODIUM SERPL-SCNC: 145 MMOL/L (ref 136–145)
TRIGL SERPL-MCNC: 133 MG/DL (ref 0–150)
TSH SERPL DL<=0.005 MIU/L-ACNC: 1.99 UIU/ML (ref 0.27–4.2)
VIT B12 SERPL-MCNC: 1526 PG/ML (ref 211–946)
VLDLC SERPL CALC-MCNC: 23 MG/DL (ref 5–40)
WBC # BLD AUTO: 12.68 10*3/MM3 (ref 3.4–10.8)

## 2023-11-21 ENCOUNTER — HOSPITAL ENCOUNTER (INPATIENT)
Facility: HOSPITAL | Age: 78
LOS: 6 days | Discharge: HOME HEALTH | DRG: 641 | End: 2023-11-27
Attending: EMERGENCY MEDICINE | Admitting: HOSPITALIST
Payer: MEDICARE

## 2023-11-21 DIAGNOSIS — E87.6 HYPOKALEMIA: ICD-10-CM

## 2023-11-21 DIAGNOSIS — I10 PRIMARY HYPERTENSION: ICD-10-CM

## 2023-11-21 DIAGNOSIS — E83.42 HYPOMAGNESEMIA: ICD-10-CM

## 2023-11-21 DIAGNOSIS — R41.89 COGNITIVE IMPAIRMENT: ICD-10-CM

## 2023-11-21 DIAGNOSIS — R94.31 LONG QT INTERVAL: ICD-10-CM

## 2023-11-21 DIAGNOSIS — I48.0 PAROXYSMAL ATRIAL FIBRILLATION: ICD-10-CM

## 2023-11-21 DIAGNOSIS — R26.81 UNSTEADY GAIT: ICD-10-CM

## 2023-11-21 DIAGNOSIS — F32.1 CURRENT MODERATE EPISODE OF MAJOR DEPRESSIVE DISORDER, UNSPECIFIED WHETHER RECURRENT: ICD-10-CM
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
## 2023-11-21 DIAGNOSIS — E11.40 CONTROLLED TYPE 2 DIABETES MELLITUS WITH DIABETIC NEUROPATHY, WITHOUT LONG-TERM CURRENT USE OF INSULIN: ICD-10-CM

## 2023-11-21 DIAGNOSIS — E83.51 HYPOCALCEMIA: Primary | ICD-10-CM

## 2023-11-21 LAB
ALBUMIN SERPL-MCNC: 3.2 G/DL (ref 3.5–5.2)
ALP SERPL-CCNC: 85 U/L (ref 39–117)
ALT SERPL W P-5'-P-CCNC: 24 U/L (ref 1–41)
ANION GAP SERPL CALCULATED.3IONS-SCNC: 13 MMOL/L (ref 5–15)
AST SERPL-CCNC: 27 U/L (ref 1–40)
BASOPHILS # BLD AUTO: 0.04 10*3/MM3 (ref 0–0.2)
BASOPHILS NFR BLD AUTO: 0.3 % (ref 0–1.5)
BILIRUB CONJ SERPL-MCNC: 0.3 MG/DL (ref 0–0.3)
BILIRUB INDIRECT SERPL-MCNC: 0.5 MG/DL
BILIRUB SERPL-MCNC: 0.8 MG/DL (ref 0–1.2)
BUN SERPL-MCNC: 12 MG/DL (ref 8–23)
BUN/CREAT SERPL: 10.3 (ref 7–25)
CALCIUM SPEC-SCNC: 5.7 MG/DL (ref 8.6–10.5)
CHLORIDE SERPL-SCNC: 101 MMOL/L (ref 98–107)
CO2 SERPL-SCNC: 31 MMOL/L (ref 22–29)
CREAT SERPL-MCNC: 1.16 MG/DL (ref 0.76–1.27)
DEPRECATED RDW RBC AUTO: 40.7 FL (ref 37–54)
EGFRCR SERPLBLD CKD-EPI 2021: 65.3 ML/MIN/1.73
EOSINOPHIL # BLD AUTO: 0.31 10*3/MM3 (ref 0–0.4)
EOSINOPHIL NFR BLD AUTO: 2.6 % (ref 0.3–6.2)
ERYTHROCYTE [DISTWIDTH] IN BLOOD BY AUTOMATED COUNT: 13.1 % (ref 12.3–15.4)
GLUCOSE BLDC GLUCOMTR-MCNC: 117 MG/DL (ref 70–130)
GLUCOSE BLDC GLUCOMTR-MCNC: 136 MG/DL (ref 70–130)
GLUCOSE SERPL-MCNC: 98 MG/DL (ref 65–99)
HCT VFR BLD AUTO: 38.1 % (ref 37.5–51)
HGB BLD-MCNC: 12.6 G/DL (ref 13–17.7)
HOLD SPECIMEN: NORMAL
HOLD SPECIMEN: NORMAL
IMM GRANULOCYTES # BLD AUTO: 0.06 10*3/MM3 (ref 0–0.05)
IMM GRANULOCYTES NFR BLD AUTO: 0.5 % (ref 0–0.5)
INR PPP: 3.23 (ref 0.9–1.1)
LYMPHOCYTES # BLD AUTO: 1.96 10*3/MM3 (ref 0.7–3.1)
LYMPHOCYTES NFR BLD AUTO: 16.5 % (ref 19.6–45.3)
MAGNESIUM SERPL-MCNC: 0.4 MG/DL (ref 1.6–2.4)
MAGNESIUM SERPL-MCNC: 1.9 MG/DL (ref 1.6–2.4)
MCH RBC QN AUTO: 28.1 PG (ref 26.6–33)
MCHC RBC AUTO-ENTMCNC: 33.1 G/DL (ref 31.5–35.7)
MCV RBC AUTO: 84.9 FL (ref 79–97)
MONOCYTES # BLD AUTO: 0.95 10*3/MM3 (ref 0.1–0.9)
MONOCYTES NFR BLD AUTO: 8 % (ref 5–12)
NEUTROPHILS NFR BLD AUTO: 72.1 % (ref 42.7–76)
NEUTROPHILS NFR BLD AUTO: 8.58 10*3/MM3 (ref 1.7–7)
NRBC BLD AUTO-RTO: 0 /100 WBC (ref 0–0.2)
PHOSPHATE SERPL-MCNC: 3.2 MG/DL (ref 2.5–4.5)
PLATELET # BLD AUTO: 246 10*3/MM3 (ref 140–450)
PMV BLD AUTO: 10.3 FL (ref 6–12)
POTASSIUM SERPL-SCNC: 2.7 MMOL/L (ref 3.5–5.2)
PROT SERPL-MCNC: 6 G/DL (ref 6–8.5)
PROTHROMBIN TIME: 33.8 SECONDS (ref 11.7–14.2)
QT INTERVAL: 541 MS
QTC INTERVAL: 589 MS
RBC # BLD AUTO: 4.49 10*6/MM3 (ref 4.14–5.8)
SODIUM SERPL-SCNC: 145 MMOL/L (ref 136–145)
WBC NRBC COR # BLD AUTO: 11.9 10*3/MM3 (ref 3.4–10.8)
WHOLE BLOOD HOLD COAG: NORMAL
WHOLE BLOOD HOLD SPECIMEN: NORMAL

## 2023-11-21 PROCEDURE — 80048 BASIC METABOLIC PNL TOTAL CA: CPT | Performed by: EMERGENCY MEDICINE

## 2023-11-21 PROCEDURE — 82948 REAGENT STRIP/BLOOD GLUCOSE: CPT

## 2023-11-21 PROCEDURE — 85610 PROTHROMBIN TIME: CPT | Performed by: EMERGENCY MEDICINE

## 2023-11-21 PROCEDURE — 93010 ELECTROCARDIOGRAM REPORT: CPT | Performed by: INTERNAL MEDICINE

## 2023-11-21 PROCEDURE — 25010000002 CALCIUM GLUCONATE-NACL 1-0.675 GM/50ML-% SOLUTION: Performed by: EMERGENCY MEDICINE

## 2023-11-21 PROCEDURE — 83735 ASSAY OF MAGNESIUM: CPT | Performed by: EMERGENCY MEDICINE

## 2023-11-21 PROCEDURE — 99285 EMERGENCY DEPT VISIT HI MDM: CPT

## 2023-11-21 PROCEDURE — 80076 HEPATIC FUNCTION PANEL: CPT | Performed by: HOSPITALIST

## 2023-11-21 PROCEDURE — 84100 ASSAY OF PHOSPHORUS: CPT | Performed by: EMERGENCY MEDICINE

## 2023-11-21 PROCEDURE — 36415 COLL VENOUS BLD VENIPUNCTURE: CPT | Performed by: HOSPITALIST

## 2023-11-21 PROCEDURE — 25010000002 POTASSIUM CHLORIDE 10 MEQ/100ML SOLUTION: Performed by: EMERGENCY MEDICINE

## 2023-11-21 PROCEDURE — 85025 COMPLETE CBC W/AUTO DIFF WBC: CPT | Performed by: EMERGENCY MEDICINE

## 2023-11-21 PROCEDURE — 93005 ELECTROCARDIOGRAM TRACING: CPT | Performed by: EMERGENCY MEDICINE

## 2023-11-21 PROCEDURE — 83735 ASSAY OF MAGNESIUM: CPT | Performed by: HOSPITALIST

## 2023-11-21 PROCEDURE — 25010000002 MAGNESIUM SULFATE 2 GM/50ML SOLUTION: Performed by: EMERGENCY MEDICINE

## 2023-11-21 RX ORDER — SODIUM CHLORIDE 0.9 % (FLUSH) 0.9 %
10 SYRINGE (ML) INJECTION AS NEEDED
Status: DISCONTINUED | OUTPATIENT
Start: 2023-11-21 | End: 2023-11-21

## 2023-11-21 RX ORDER — PANTOPRAZOLE SODIUM 40 MG/1
40 TABLET, DELAYED RELEASE ORAL DAILY
Status: DISCONTINUED | OUTPATIENT
Start: 2023-11-21 | End: 2023-11-21

## 2023-11-21 RX ORDER — POTASSIUM CHLORIDE 7.45 MG/ML
10 INJECTION INTRAVENOUS
Status: COMPLETED | OUTPATIENT
Start: 2023-11-21 | End: 2023-11-21

## 2023-11-21 RX ORDER — CALCIUM GLUCONATE 20 MG/ML
1000 INJECTION, SOLUTION INTRAVENOUS ONCE
Status: COMPLETED | OUTPATIENT
Start: 2023-11-21 | End: 2023-11-21

## 2023-11-21 RX ORDER — NICOTINE POLACRILEX 4 MG
15 LOZENGE BUCCAL
Status: DISCONTINUED | OUTPATIENT
Start: 2023-11-21 | End: 2023-11-27 | Stop reason: HOSPADM

## 2023-11-21 RX ORDER — ONDANSETRON 4 MG/1
4 TABLET, FILM COATED ORAL EVERY 6 HOURS PRN
Status: DISCONTINUED | OUTPATIENT
Start: 2023-11-21 | End: 2023-11-21

## 2023-11-21 RX ORDER — MAGNESIUM SULFATE HEPTAHYDRATE 40 MG/ML
2 INJECTION, SOLUTION INTRAVENOUS
Status: COMPLETED | OUTPATIENT
Start: 2023-11-21 | End: 2023-11-21

## 2023-11-21 RX ORDER — CHOLESTYRAMINE LIGHT 4 G/5.7G
1 POWDER, FOR SUSPENSION ORAL DAILY
Status: DISCONTINUED | OUTPATIENT
Start: 2023-11-22 | End: 2023-11-27 | Stop reason: HOSPADM

## 2023-11-21 RX ORDER — SODIUM CHLORIDE 9 MG/ML
40 INJECTION, SOLUTION INTRAVENOUS AS NEEDED
Status: DISCONTINUED | OUTPATIENT
Start: 2023-11-21 | End: 2023-11-21

## 2023-11-21 RX ORDER — METOPROLOL SUCCINATE 25 MG/1
25 TABLET, EXTENDED RELEASE ORAL DAILY
Status: DISCONTINUED | OUTPATIENT
Start: 2023-11-21 | End: 2023-11-27 | Stop reason: HOSPADM

## 2023-11-21 RX ORDER — LEVOTHYROXINE SODIUM 0.05 MG/1
50 TABLET ORAL
Status: DISCONTINUED | OUTPATIENT
Start: 2023-11-22 | End: 2023-11-27 | Stop reason: HOSPADM

## 2023-11-21 RX ORDER — DIPHENOXYLATE HYDROCHLORIDE AND ATROPINE SULFATE 2.5; .025 MG/1; MG/1
1 TABLET ORAL 2 TIMES DAILY
Status: DISCONTINUED | OUTPATIENT
Start: 2023-11-21 | End: 2023-11-21

## 2023-11-21 RX ORDER — DEXTROSE MONOHYDRATE 25 G/50ML
25 INJECTION, SOLUTION INTRAVENOUS
Status: DISCONTINUED | OUTPATIENT
Start: 2023-11-21 | End: 2023-11-27 | Stop reason: HOSPADM

## 2023-11-21 RX ORDER — SODIUM CHLORIDE 0.9 % (FLUSH) 0.9 %
10 SYRINGE (ML) INJECTION EVERY 12 HOURS SCHEDULED
Status: DISCONTINUED | OUTPATIENT
Start: 2023-11-21 | End: 2023-11-21

## 2023-11-21 RX ORDER — ACETAMINOPHEN 325 MG/1
650 TABLET ORAL EVERY 4 HOURS PRN
Status: DISCONTINUED | OUTPATIENT
Start: 2023-11-21 | End: 2023-11-27 | Stop reason: HOSPADM

## 2023-11-21 RX ORDER — ONDANSETRON 2 MG/ML
4 INJECTION INTRAMUSCULAR; INTRAVENOUS EVERY 6 HOURS PRN
Status: DISCONTINUED | OUTPATIENT
Start: 2023-11-21 | End: 2023-11-21

## 2023-11-21 RX ORDER — FLUOXETINE 10 MG/1
10 CAPSULE ORAL DAILY
Status: DISCONTINUED | OUTPATIENT
Start: 2023-11-21 | End: 2023-11-27 | Stop reason: HOSPADM

## 2023-11-21 RX ORDER — MEMANTINE HYDROCHLORIDE 10 MG/1
10 TABLET ORAL 2 TIMES DAILY
Status: DISCONTINUED | OUTPATIENT
Start: 2023-11-21 | End: 2023-11-27 | Stop reason: HOSPADM

## 2023-11-21 RX ORDER — ACETAMINOPHEN 160 MG/5ML
650 SOLUTION ORAL EVERY 4 HOURS PRN
Status: DISCONTINUED | OUTPATIENT
Start: 2023-11-21 | End: 2023-11-21

## 2023-11-21 RX ORDER — NITROGLYCERIN 0.4 MG/1
0.4 TABLET SUBLINGUAL
Status: DISCONTINUED | OUTPATIENT
Start: 2023-11-21 | End: 2023-11-27 | Stop reason: HOSPADM

## 2023-11-21 RX ORDER — ATORVASTATIN CALCIUM 20 MG/1
40 TABLET, FILM COATED ORAL NIGHTLY
Status: DISCONTINUED | OUTPATIENT
Start: 2023-11-21 | End: 2023-11-27 | Stop reason: HOSPADM

## 2023-11-21 RX ORDER — IBUPROFEN 600 MG/1
1 TABLET ORAL
Status: DISCONTINUED | OUTPATIENT
Start: 2023-11-21 | End: 2023-11-27 | Stop reason: HOSPADM

## 2023-11-21 RX ORDER — ACETAMINOPHEN 650 MG/1
650 SUPPOSITORY RECTAL EVERY 4 HOURS PRN
Status: DISCONTINUED | OUTPATIENT
Start: 2023-11-21 | End: 2023-11-21

## 2023-11-21 RX ORDER — WARFARIN SODIUM 5 MG/1
2.5 TABLET ORAL SEE ADMIN INSTRUCTIONS
COMMUNITY
End: 2023-11-29 | Stop reason: SDUPTHER

## 2023-11-21 RX ORDER — DONEPEZIL HYDROCHLORIDE 10 MG/1
10 TABLET, FILM COATED ORAL NIGHTLY
Status: DISCONTINUED | OUTPATIENT
Start: 2023-11-21 | End: 2023-11-27 | Stop reason: HOSPADM

## 2023-11-21 RX ORDER — LEVETIRACETAM 500 MG/1
500 TABLET ORAL 2 TIMES DAILY
Status: DISCONTINUED | OUTPATIENT
Start: 2023-11-21 | End: 2023-11-27 | Stop reason: HOSPADM

## 2023-11-21 RX ORDER — INSULIN LISPRO 100 [IU]/ML
2-7 INJECTION, SOLUTION INTRAVENOUS; SUBCUTANEOUS
Status: DISCONTINUED | OUTPATIENT
Start: 2023-11-21 | End: 2023-11-27 | Stop reason: HOSPADM

## 2023-11-21 RX ADMIN — POTASSIUM CHLORIDE 10 MEQ: 7.46 INJECTION, SOLUTION INTRAVENOUS at 12:21

## 2023-11-21 RX ADMIN — ATORVASTATIN CALCIUM 40 MG: 20 TABLET, FILM COATED ORAL at 21:14

## 2023-11-21 RX ADMIN — MAGNESIUM SULFATE 2 G: 2 INJECTION INTRAVENOUS at 14:19

## 2023-11-21 RX ADMIN — MAGNESIUM SULFATE 2 G: 2 INJECTION INTRAVENOUS at 18:17

## 2023-11-21 RX ADMIN — METOPROLOL SUCCINATE 25 MG: 25 TABLET, EXTENDED RELEASE ORAL at 18:17

## 2023-11-21 RX ADMIN — POTASSIUM CHLORIDE 10 MEQ: 7.46 INJECTION, SOLUTION INTRAVENOUS at 14:44

## 2023-11-21 RX ADMIN — POTASSIUM CHLORIDE 10 MEQ: 7.46 INJECTION, SOLUTION INTRAVENOUS at 13:33

## 2023-11-21 RX ADMIN — POTASSIUM CHLORIDE 10 MEQ: 7.46 INJECTION, SOLUTION INTRAVENOUS at 17:18

## 2023-11-21 RX ADMIN — CALCIUM GLUCONATE 1000 MG: 20 INJECTION, SOLUTION INTRAVENOUS at 12:32

## 2023-11-21 RX ADMIN — CALCIUM GLUCONATE 1000 MG: 20 INJECTION, SOLUTION INTRAVENOUS at 14:43

## 2023-11-21 RX ADMIN — FLUOXETINE HYDROCHLORIDE 10 MG: 10 CAPSULE ORAL at 18:17

## 2023-11-21 RX ADMIN — MAGNESIUM SULFATE 2 G: 2 INJECTION INTRAVENOUS at 15:57

## 2023-11-21 RX ADMIN — DONEPEZIL HYDROCHLORIDE 10 MG: 10 TABLET, FILM COATED ORAL at 21:14

## 2023-11-21 RX ADMIN — MAGNESIUM SULFATE 2 G: 2 INJECTION INTRAVENOUS at 20:35

## 2023-11-21 RX ADMIN — MAGNESIUM SULFATE HEPTAHYDRATE 2 G: 40 INJECTION, SOLUTION INTRAVENOUS at 13:43

## 2023-11-21 RX ADMIN — POTASSIUM CHLORIDE 10 MEQ: 7.46 INJECTION, SOLUTION INTRAVENOUS at 18:17

## 2023-11-21 RX ADMIN — MEMANTINE HYDROCHLORIDE 10 MG: 10 TABLET, FILM COATED ORAL at 21:14

## 2023-11-21 RX ADMIN — POTASSIUM CHLORIDE 10 MEQ: 7.46 INJECTION, SOLUTION INTRAVENOUS at 15:58

## 2023-11-21 RX ADMIN — LEVETIRACETAM 500 MG: 500 TABLET, FILM COATED ORAL at 21:56

## 2023-11-21 RX ADMIN — CALCIUM GLUCONATE 1000 MG: 20 INJECTION, SOLUTION INTRAVENOUS at 13:44

## 2023-11-22 ENCOUNTER — HOME HEALTH ADMISSION (OUTPATIENT)
Dept: HOME HEALTH SERVICES | Facility: HOME HEALTHCARE | Age: 78
End: 2023-11-22
Payer: MEDICARE

## 2023-11-22 LAB
ALBUMIN SERPL-MCNC: 3.3 G/DL (ref 3.5–5.2)
ANION GAP SERPL CALCULATED.3IONS-SCNC: 12.9 MMOL/L (ref 5–15)
BUN SERPL-MCNC: 9 MG/DL (ref 8–23)
BUN/CREAT SERPL: 9.4 (ref 7–25)
CALCIUM SPEC-SCNC: 6.4 MG/DL (ref 8.6–10.5)
CHLORIDE SERPL-SCNC: 100 MMOL/L (ref 98–107)
CO2 SERPL-SCNC: 27.1 MMOL/L (ref 22–29)
CREAT SERPL-MCNC: 0.96 MG/DL (ref 0.76–1.27)
DEPRECATED RDW RBC AUTO: 40.2 FL (ref 37–54)
EGFRCR SERPLBLD CKD-EPI 2021: 81.9 ML/MIN/1.73
ERYTHROCYTE [DISTWIDTH] IN BLOOD BY AUTOMATED COUNT: 13.3 % (ref 12.3–15.4)
GLUCOSE BLDC GLUCOMTR-MCNC: 108 MG/DL (ref 70–130)
GLUCOSE BLDC GLUCOMTR-MCNC: 128 MG/DL (ref 70–130)
GLUCOSE BLDC GLUCOMTR-MCNC: 129 MG/DL (ref 70–130)
GLUCOSE BLDC GLUCOMTR-MCNC: 150 MG/DL (ref 70–130)
GLUCOSE SERPL-MCNC: 108 MG/DL (ref 65–99)
HCT VFR BLD AUTO: 38 % (ref 37.5–51)
HGB BLD-MCNC: 12.8 G/DL (ref 13–17.7)
INR PPP: 2.96 (ref 0.9–1.1)
MAGNESIUM SERPL-MCNC: 2.4 MG/DL (ref 1.6–2.4)
MCH RBC QN AUTO: 28.1 PG (ref 26.6–33)
MCHC RBC AUTO-ENTMCNC: 33.7 G/DL (ref 31.5–35.7)
MCV RBC AUTO: 83.3 FL (ref 79–97)
PHOSPHATE SERPL-MCNC: 2.9 MG/DL (ref 2.5–4.5)
PLATELET # BLD AUTO: 236 10*3/MM3 (ref 140–450)
PMV BLD AUTO: 10.2 FL (ref 6–12)
POTASSIUM SERPL-SCNC: 2.8 MMOL/L (ref 3.5–5.2)
POTASSIUM SERPL-SCNC: 3.2 MMOL/L (ref 3.5–5.2)
PROTHROMBIN TIME: 31.4 SECONDS (ref 11.7–14.2)
QT INTERVAL: 556 MS
QTC INTERVAL: 508 MS
RBC # BLD AUTO: 4.56 10*6/MM3 (ref 4.14–5.8)
SODIUM SERPL-SCNC: 140 MMOL/L (ref 136–145)
WBC NRBC COR # BLD AUTO: 11.92 10*3/MM3 (ref 3.4–10.8)

## 2023-11-22 PROCEDURE — 82948 REAGENT STRIP/BLOOD GLUCOSE: CPT

## 2023-11-22 PROCEDURE — 25010000002 POTASSIUM CHLORIDE 10 MEQ/100ML SOLUTION: Performed by: HOSPITALIST

## 2023-11-22 PROCEDURE — 83735 ASSAY OF MAGNESIUM: CPT | Performed by: HOSPITALIST

## 2023-11-22 PROCEDURE — 93005 ELECTROCARDIOGRAM TRACING: CPT | Performed by: HOSPITALIST

## 2023-11-22 PROCEDURE — 84132 ASSAY OF SERUM POTASSIUM: CPT | Performed by: HOSPITALIST

## 2023-11-22 PROCEDURE — 93010 ELECTROCARDIOGRAM REPORT: CPT | Performed by: INTERNAL MEDICINE

## 2023-11-22 PROCEDURE — 80069 RENAL FUNCTION PANEL: CPT | Performed by: HOSPITALIST

## 2023-11-22 PROCEDURE — 25010000002 CALCIUM GLUCONATE 2-0.675 GM/100ML-% SOLUTION: Performed by: HOSPITALIST

## 2023-11-22 PROCEDURE — 85610 PROTHROMBIN TIME: CPT | Performed by: HOSPITALIST

## 2023-11-22 PROCEDURE — 25010000002 OLANZAPINE 10 MG RECONSTITUTED SOLUTION: Performed by: NURSE PRACTITIONER

## 2023-11-22 PROCEDURE — 85027 COMPLETE CBC AUTOMATED: CPT | Performed by: NURSE PRACTITIONER

## 2023-11-22 PROCEDURE — 99222 1ST HOSP IP/OBS MODERATE 55: CPT | Performed by: NURSE PRACTITIONER

## 2023-11-22 RX ORDER — WARFARIN SODIUM 2.5 MG/1
2.5 TABLET ORAL
Status: DISCONTINUED | OUTPATIENT
Start: 2023-11-22 | End: 2023-11-24

## 2023-11-22 RX ORDER — CALCIUM GLUCONATE 20 MG/ML
1000 INJECTION, SOLUTION INTRAVENOUS
Status: DISPENSED | OUTPATIENT
Start: 2023-11-22 | End: 2023-11-22

## 2023-11-22 RX ORDER — DICYCLOMINE HYDROCHLORIDE 10 MG/1
10 CAPSULE ORAL 4 TIMES DAILY PRN
Status: DISCONTINUED | OUTPATIENT
Start: 2023-11-22 | End: 2023-11-27 | Stop reason: HOSPADM

## 2023-11-22 RX ORDER — OLANZAPINE 10 MG/2ML
5 INJECTION, POWDER, FOR SOLUTION INTRAMUSCULAR ONCE
Status: COMPLETED | OUTPATIENT
Start: 2023-11-22 | End: 2023-11-22

## 2023-11-22 RX ORDER — POTASSIUM CHLORIDE 7.45 MG/ML
10 INJECTION INTRAVENOUS
Status: COMPLETED | OUTPATIENT
Start: 2023-11-22 | End: 2023-11-22

## 2023-11-22 RX ORDER — CALCIUM GLUCONATE 20 MG/ML
2000 INJECTION, SOLUTION INTRAVENOUS
Status: COMPLETED | OUTPATIENT
Start: 2023-11-22 | End: 2023-11-22

## 2023-11-22 RX ADMIN — CHOLESTYRAMINE 4 G: 4 POWDER, FOR SUSPENSION ORAL at 09:02

## 2023-11-22 RX ADMIN — MEMANTINE HYDROCHLORIDE 10 MG: 10 TABLET, FILM COATED ORAL at 08:05

## 2023-11-22 RX ADMIN — CALCIUM GLUCONATE 2000 MG: 20 INJECTION, SOLUTION INTRAVENOUS at 09:01

## 2023-11-22 RX ADMIN — POTASSIUM CHLORIDE 10 MEQ: 7.46 INJECTION, SOLUTION INTRAVENOUS at 08:56

## 2023-11-22 RX ADMIN — DONEPEZIL HYDROCHLORIDE 10 MG: 10 TABLET, FILM COATED ORAL at 21:17

## 2023-11-22 RX ADMIN — LEVETIRACETAM 500 MG: 500 TABLET, FILM COATED ORAL at 21:19

## 2023-11-22 RX ADMIN — POTASSIUM CHLORIDE 10 MEQ: 7.46 INJECTION, SOLUTION INTRAVENOUS at 07:54

## 2023-11-22 RX ADMIN — CALCIUM GLUCONATE 2000 MG: 20 INJECTION, SOLUTION INTRAVENOUS at 16:41

## 2023-11-22 RX ADMIN — LEVOTHYROXINE SODIUM 50 MCG: 50 TABLET ORAL at 06:16

## 2023-11-22 RX ADMIN — POTASSIUM CHLORIDE 10 MEQ: 7.46 INJECTION, SOLUTION INTRAVENOUS at 16:41

## 2023-11-22 RX ADMIN — FLUOXETINE HYDROCHLORIDE 10 MG: 10 CAPSULE ORAL at 08:03

## 2023-11-22 RX ADMIN — ATORVASTATIN CALCIUM 40 MG: 20 TABLET, FILM COATED ORAL at 21:17

## 2023-11-22 RX ADMIN — CALCIUM GLUCONATE 2000 MG: 20 INJECTION, SOLUTION INTRAVENOUS at 12:51

## 2023-11-22 RX ADMIN — MEMANTINE HYDROCHLORIDE 10 MG: 10 TABLET, FILM COATED ORAL at 21:17

## 2023-11-22 RX ADMIN — LEVETIRACETAM 500 MG: 500 TABLET, FILM COATED ORAL at 08:03

## 2023-11-22 RX ADMIN — POTASSIUM CHLORIDE 10 MEQ: 7.46 INJECTION, SOLUTION INTRAVENOUS at 10:24

## 2023-11-22 RX ADMIN — POTASSIUM CHLORIDE 10 MEQ: 7.46 INJECTION, SOLUTION INTRAVENOUS at 12:50

## 2023-11-22 RX ADMIN — OLANZAPINE 5 MG: 10 INJECTION, POWDER, FOR SOLUTION INTRAMUSCULAR at 20:53

## 2023-11-22 RX ADMIN — WARFARIN 2.5 MG: 2.5 TABLET ORAL at 18:19

## 2023-11-22 RX ADMIN — POTASSIUM CHLORIDE 10 MEQ: 7.46 INJECTION, SOLUTION INTRAVENOUS at 15:17

## 2023-11-23 LAB
ADV 40+41 DNA STL QL NAA+NON-PROBE: NOT DETECTED
ALBUMIN SERPL-MCNC: 3.3 G/DL (ref 3.5–5.2)
ANION GAP SERPL CALCULATED.3IONS-SCNC: 10.7 MMOL/L (ref 5–15)
ASTRO TYP 1-8 RNA STL QL NAA+NON-PROBE: NOT DETECTED
BUN SERPL-MCNC: 11 MG/DL (ref 8–23)
BUN/CREAT SERPL: 10 (ref 7–25)
C CAYETANENSIS DNA STL QL NAA+NON-PROBE: NOT DETECTED
C COLI+JEJ+UPSA DNA STL QL NAA+NON-PROBE: NOT DETECTED
C DIFF TOX GENS STL QL NAA+PROBE: NEGATIVE
CALCIUM SPEC-SCNC: 7.6 MG/DL (ref 8.6–10.5)
CHLORIDE SERPL-SCNC: 105 MMOL/L (ref 98–107)
CO2 SERPL-SCNC: 29.3 MMOL/L (ref 22–29)
CREAT SERPL-MCNC: 1.1 MG/DL (ref 0.76–1.27)
CRYPTOSP DNA STL QL NAA+NON-PROBE: NOT DETECTED
DEPRECATED RDW RBC AUTO: 38.5 FL (ref 37–54)
E HISTOLYT DNA STL QL NAA+NON-PROBE: NOT DETECTED
EAEC PAA PLAS AGGR+AATA ST NAA+NON-PRB: NOT DETECTED
EC STX1+STX2 GENES STL QL NAA+NON-PROBE: NOT DETECTED
EGFRCR SERPLBLD CKD-EPI 2021: 69.6 ML/MIN/1.73
EPEC EAE GENE STL QL NAA+NON-PROBE: NOT DETECTED
ERYTHROCYTE [DISTWIDTH] IN BLOOD BY AUTOMATED COUNT: 13.1 % (ref 12.3–15.4)
ETEC LTA+ST1A+ST1B TOX ST NAA+NON-PROBE: NOT DETECTED
G LAMBLIA DNA STL QL NAA+NON-PROBE: NOT DETECTED
GLUCOSE BLDC GLUCOMTR-MCNC: 106 MG/DL (ref 70–130)
GLUCOSE BLDC GLUCOMTR-MCNC: 122 MG/DL (ref 70–130)
GLUCOSE BLDC GLUCOMTR-MCNC: 129 MG/DL (ref 70–130)
GLUCOSE BLDC GLUCOMTR-MCNC: 154 MG/DL (ref 70–130)
GLUCOSE SERPL-MCNC: 113 MG/DL (ref 65–99)
HCT VFR BLD AUTO: 38.2 % (ref 37.5–51)
HGB BLD-MCNC: 12.8 G/DL (ref 13–17.7)
INR PPP: 1.86 (ref 0.9–1.1)
MAGNESIUM SERPL-MCNC: 2.2 MG/DL (ref 1.6–2.4)
MCH RBC QN AUTO: 27.3 PG (ref 26.6–33)
MCHC RBC AUTO-ENTMCNC: 33.5 G/DL (ref 31.5–35.7)
MCV RBC AUTO: 81.4 FL (ref 79–97)
NOROVIRUS GI+II RNA STL QL NAA+NON-PROBE: NOT DETECTED
P SHIGELLOIDES DNA STL QL NAA+NON-PROBE: NOT DETECTED
PHOSPHATE SERPL-MCNC: 2 MG/DL (ref 2.5–4.5)
PLATELET # BLD AUTO: 238 10*3/MM3 (ref 140–450)
PMV BLD AUTO: 9.8 FL (ref 6–12)
POTASSIUM SERPL-SCNC: 3.1 MMOL/L (ref 3.5–5.2)
POTASSIUM SERPL-SCNC: 3.2 MMOL/L (ref 3.5–5.2)
PROTHROMBIN TIME: 21.7 SECONDS (ref 11.7–14.2)
RBC # BLD AUTO: 4.69 10*6/MM3 (ref 4.14–5.8)
RVA RNA STL QL NAA+NON-PROBE: NOT DETECTED
S ENT+BONG DNA STL QL NAA+NON-PROBE: NOT DETECTED
SAPO I+II+IV+V RNA STL QL NAA+NON-PROBE: NOT DETECTED
SHIGELLA SP+EIEC IPAH ST NAA+NON-PROBE: NOT DETECTED
SODIUM SERPL-SCNC: 145 MMOL/L (ref 136–145)
V CHOL+PARA+VUL DNA STL QL NAA+NON-PROBE: NOT DETECTED
V CHOLERAE DNA STL QL NAA+NON-PROBE: NOT DETECTED
WBC NRBC COR # BLD AUTO: 10.14 10*3/MM3 (ref 3.4–10.8)
Y ENTEROCOL DNA STL QL NAA+NON-PROBE: NOT DETECTED

## 2023-11-23 PROCEDURE — 80048 BASIC METABOLIC PNL TOTAL CA: CPT | Performed by: STUDENT IN AN ORGANIZED HEALTH CARE EDUCATION/TRAINING PROGRAM

## 2023-11-23 PROCEDURE — 83735 ASSAY OF MAGNESIUM: CPT | Performed by: HOSPITALIST

## 2023-11-23 PROCEDURE — 82948 REAGENT STRIP/BLOOD GLUCOSE: CPT

## 2023-11-23 PROCEDURE — 85610 PROTHROMBIN TIME: CPT | Performed by: INTERNAL MEDICINE

## 2023-11-23 PROCEDURE — 85027 COMPLETE CBC AUTOMATED: CPT | Performed by: HOSPITALIST

## 2023-11-23 PROCEDURE — 99232 SBSQ HOSP IP/OBS MODERATE 35: CPT | Performed by: INTERNAL MEDICINE

## 2023-11-23 PROCEDURE — 80069 RENAL FUNCTION PANEL: CPT | Performed by: HOSPITALIST

## 2023-11-23 PROCEDURE — 36415 COLL VENOUS BLD VENIPUNCTURE: CPT | Performed by: HOSPITALIST

## 2023-11-23 PROCEDURE — 97162 PT EVAL MOD COMPLEX 30 MIN: CPT

## 2023-11-23 PROCEDURE — 97110 THERAPEUTIC EXERCISES: CPT

## 2023-11-23 PROCEDURE — 87507 IADNA-DNA/RNA PROBE TQ 12-25: CPT | Performed by: NURSE PRACTITIONER

## 2023-11-23 PROCEDURE — 84132 ASSAY OF SERUM POTASSIUM: CPT | Performed by: INTERNAL MEDICINE

## 2023-11-23 PROCEDURE — 93005 ELECTROCARDIOGRAM TRACING: CPT | Performed by: HOSPITALIST

## 2023-11-23 PROCEDURE — 87493 C DIFF AMPLIFIED PROBE: CPT | Performed by: NURSE PRACTITIONER

## 2023-11-23 PROCEDURE — 25010000002 POTASSIUM CHLORIDE 10 MEQ/100ML SOLUTION: Performed by: INTERNAL MEDICINE

## 2023-11-23 PROCEDURE — 25010000002 OLANZAPINE 10 MG RECONSTITUTED SOLUTION: Performed by: NURSE PRACTITIONER

## 2023-11-23 PROCEDURE — 93010 ELECTROCARDIOGRAM REPORT: CPT | Performed by: INTERNAL MEDICINE

## 2023-11-23 RX ORDER — POTASSIUM CHLORIDE 7.45 MG/ML
10 INJECTION INTRAVENOUS
Status: COMPLETED | OUTPATIENT
Start: 2023-11-23 | End: 2023-11-23

## 2023-11-23 RX ORDER — OLANZAPINE 10 MG/2ML
5 INJECTION, POWDER, FOR SOLUTION INTRAMUSCULAR ONCE
Status: COMPLETED | OUTPATIENT
Start: 2023-11-23 | End: 2023-11-23

## 2023-11-23 RX ORDER — POTASSIUM CHLORIDE 750 MG/1
40 TABLET, FILM COATED, EXTENDED RELEASE ORAL EVERY 4 HOURS
Status: COMPLETED | OUTPATIENT
Start: 2023-11-23 | End: 2023-11-23

## 2023-11-23 RX ORDER — POTASSIUM CHLORIDE 7.45 MG/ML
10 INJECTION INTRAVENOUS
Status: CANCELLED | OUTPATIENT
Start: 2023-11-23 | End: 2023-11-23

## 2023-11-23 RX ADMIN — POTASSIUM CHLORIDE 10 MEQ: 7.46 INJECTION, SOLUTION INTRAVENOUS at 05:07

## 2023-11-23 RX ADMIN — MEMANTINE HYDROCHLORIDE 10 MG: 10 TABLET, FILM COATED ORAL at 20:19

## 2023-11-23 RX ADMIN — POTASSIUM CHLORIDE 40 MEQ: 750 TABLET, EXTENDED RELEASE ORAL at 15:46

## 2023-11-23 RX ADMIN — MEMANTINE HYDROCHLORIDE 10 MG: 10 TABLET, FILM COATED ORAL at 08:31

## 2023-11-23 RX ADMIN — POTASSIUM CHLORIDE 10 MEQ: 7.46 INJECTION, SOLUTION INTRAVENOUS at 02:49

## 2023-11-23 RX ADMIN — LEVETIRACETAM 500 MG: 500 TABLET, FILM COATED ORAL at 20:19

## 2023-11-23 RX ADMIN — POTASSIUM CHLORIDE 10 MEQ: 7.46 INJECTION, SOLUTION INTRAVENOUS at 08:31

## 2023-11-23 RX ADMIN — ATORVASTATIN CALCIUM 40 MG: 20 TABLET, FILM COATED ORAL at 20:19

## 2023-11-23 RX ADMIN — LEVOTHYROXINE SODIUM 50 MCG: 50 TABLET ORAL at 05:06

## 2023-11-23 RX ADMIN — WARFARIN 2.5 MG: 2.5 TABLET ORAL at 17:20

## 2023-11-23 RX ADMIN — METOPROLOL SUCCINATE 25 MG: 25 TABLET, EXTENDED RELEASE ORAL at 08:31

## 2023-11-23 RX ADMIN — CHOLESTYRAMINE 4 G: 4 POWDER, FOR SUSPENSION ORAL at 08:32

## 2023-11-23 RX ADMIN — LEVETIRACETAM 500 MG: 500 TABLET, FILM COATED ORAL at 08:31

## 2023-11-23 RX ADMIN — OLANZAPINE 5 MG: 10 INJECTION, POWDER, FOR SOLUTION INTRAMUSCULAR at 23:52

## 2023-11-23 RX ADMIN — DONEPEZIL HYDROCHLORIDE 10 MG: 10 TABLET, FILM COATED ORAL at 20:19

## 2023-11-23 RX ADMIN — FLUOXETINE HYDROCHLORIDE 10 MG: 10 CAPSULE ORAL at 08:31

## 2023-11-23 RX ADMIN — POTASSIUM CHLORIDE 10 MEQ: 7.46 INJECTION, SOLUTION INTRAVENOUS at 06:22

## 2023-11-23 RX ADMIN — POTASSIUM CHLORIDE 40 MEQ: 750 TABLET, EXTENDED RELEASE ORAL at 20:19

## 2023-11-24 LAB
ALBUMIN SERPL-MCNC: 3.4 G/DL (ref 3.5–5.2)
ALBUMIN SERPL-MCNC: 3.5 G/DL (ref 3.5–5.2)
ALBUMIN/GLOB SERPL: 1.2 G/DL
ALP SERPL-CCNC: 83 U/L (ref 39–117)
ALT SERPL W P-5'-P-CCNC: 21 U/L (ref 1–41)
ANION GAP SERPL CALCULATED.3IONS-SCNC: 10 MMOL/L (ref 5–15)
ANION GAP SERPL CALCULATED.3IONS-SCNC: 16.6 MMOL/L (ref 5–15)
ANION GAP SERPL CALCULATED.3IONS-SCNC: 9 MMOL/L (ref 5–15)
AST SERPL-CCNC: 18 U/L (ref 1–40)
BILIRUB SERPL-MCNC: 0.6 MG/DL (ref 0–1.2)
BUN SERPL-MCNC: 10 MG/DL (ref 8–23)
BUN/CREAT SERPL: 10.1 (ref 7–25)
BUN/CREAT SERPL: 10.2 (ref 7–25)
BUN/CREAT SERPL: 8.9 (ref 7–25)
CALCIUM SPEC-SCNC: 7.8 MG/DL (ref 8.6–10.5)
CALCIUM SPEC-SCNC: 7.8 MG/DL (ref 8.6–10.5)
CALCIUM SPEC-SCNC: 8 MG/DL (ref 8.6–10.5)
CHLORIDE SERPL-SCNC: 104 MMOL/L (ref 98–107)
CHLORIDE SERPL-SCNC: 105 MMOL/L (ref 98–107)
CHLORIDE SERPL-SCNC: 107 MMOL/L (ref 98–107)
CO2 SERPL-SCNC: 23.4 MMOL/L (ref 22–29)
CO2 SERPL-SCNC: 28 MMOL/L (ref 22–29)
CO2 SERPL-SCNC: 28 MMOL/L (ref 22–29)
CREAT SERPL-MCNC: 0.98 MG/DL (ref 0.76–1.27)
CREAT SERPL-MCNC: 0.99 MG/DL (ref 0.76–1.27)
CREAT SERPL-MCNC: 1.12 MG/DL (ref 0.76–1.27)
DEPRECATED RDW RBC AUTO: 41.2 FL (ref 37–54)
EGFRCR SERPLBLD CKD-EPI 2021: 68.1 ML/MIN/1.73
EGFRCR SERPLBLD CKD-EPI 2021: 78.9 ML/MIN/1.73
EGFRCR SERPLBLD CKD-EPI 2021: 79.9 ML/MIN/1.73
ERYTHROCYTE [DISTWIDTH] IN BLOOD BY AUTOMATED COUNT: 13.4 % (ref 12.3–15.4)
GLOBULIN UR ELPH-MCNC: 2.9 GM/DL
GLUCOSE BLDC GLUCOMTR-MCNC: 104 MG/DL (ref 70–130)
GLUCOSE BLDC GLUCOMTR-MCNC: 110 MG/DL (ref 70–130)
GLUCOSE BLDC GLUCOMTR-MCNC: 110 MG/DL (ref 70–130)
GLUCOSE BLDC GLUCOMTR-MCNC: 134 MG/DL (ref 70–130)
GLUCOSE SERPL-MCNC: 102 MG/DL (ref 65–99)
GLUCOSE SERPL-MCNC: 104 MG/DL (ref 65–99)
GLUCOSE SERPL-MCNC: 94 MG/DL (ref 65–99)
HCT VFR BLD AUTO: 39.7 % (ref 37.5–51)
HGB BLD-MCNC: 12.7 G/DL (ref 13–17.7)
INR PPP: 1.9 (ref 0.9–1.1)
MAGNESIUM SERPL-MCNC: 2.2 MG/DL (ref 1.6–2.4)
MCH RBC QN AUTO: 27.3 PG (ref 26.6–33)
MCHC RBC AUTO-ENTMCNC: 32 G/DL (ref 31.5–35.7)
MCV RBC AUTO: 85.2 FL (ref 79–97)
PHOSPHATE SERPL-MCNC: 1.1 MG/DL (ref 2.5–4.5)
PHOSPHATE SERPL-MCNC: 1.7 MG/DL (ref 2.5–4.5)
PLATELET # BLD AUTO: 256 10*3/MM3 (ref 140–450)
PMV BLD AUTO: 9.8 FL (ref 6–12)
POTASSIUM SERPL-SCNC: 3.5 MMOL/L (ref 3.5–5.2)
POTASSIUM SERPL-SCNC: 3.5 MMOL/L (ref 3.5–5.2)
POTASSIUM SERPL-SCNC: 3.6 MMOL/L (ref 3.5–5.2)
POTASSIUM SERPL-SCNC: 4.2 MMOL/L (ref 3.5–5.2)
PROT SERPL-MCNC: 6.4 G/DL (ref 6–8.5)
PROTHROMBIN TIME: 22.1 SECONDS (ref 11.7–14.2)
QT INTERVAL: 462 MS
QTC INTERVAL: 503 MS
RBC # BLD AUTO: 4.66 10*6/MM3 (ref 4.14–5.8)
SODIUM SERPL-SCNC: 142 MMOL/L (ref 136–145)
SODIUM SERPL-SCNC: 144 MMOL/L (ref 136–145)
SODIUM SERPL-SCNC: 145 MMOL/L (ref 136–145)
WBC NRBC COR # BLD AUTO: 9.92 10*3/MM3 (ref 3.4–10.8)

## 2023-11-24 PROCEDURE — 99232 SBSQ HOSP IP/OBS MODERATE 35: CPT | Performed by: PHYSICIAN ASSISTANT

## 2023-11-24 PROCEDURE — 84100 ASSAY OF PHOSPHORUS: CPT | Performed by: HOSPITALIST

## 2023-11-24 PROCEDURE — 25810000003 SODIUM CHLORIDE 0.9 % SOLUTION: Performed by: STUDENT IN AN ORGANIZED HEALTH CARE EDUCATION/TRAINING PROGRAM

## 2023-11-24 PROCEDURE — 84132 ASSAY OF SERUM POTASSIUM: CPT | Performed by: STUDENT IN AN ORGANIZED HEALTH CARE EDUCATION/TRAINING PROGRAM

## 2023-11-24 PROCEDURE — 80053 COMPREHEN METABOLIC PANEL: CPT | Performed by: HOSPITALIST

## 2023-11-24 PROCEDURE — 82948 REAGENT STRIP/BLOOD GLUCOSE: CPT

## 2023-11-24 PROCEDURE — 84100 ASSAY OF PHOSPHORUS: CPT | Performed by: STUDENT IN AN ORGANIZED HEALTH CARE EDUCATION/TRAINING PROGRAM

## 2023-11-24 PROCEDURE — 85610 PROTHROMBIN TIME: CPT | Performed by: INTERNAL MEDICINE

## 2023-11-24 PROCEDURE — 36415 COLL VENOUS BLD VENIPUNCTURE: CPT | Performed by: HOSPITALIST

## 2023-11-24 PROCEDURE — 83735 ASSAY OF MAGNESIUM: CPT | Performed by: HOSPITALIST

## 2023-11-24 PROCEDURE — 85027 COMPLETE CBC AUTOMATED: CPT | Performed by: STUDENT IN AN ORGANIZED HEALTH CARE EDUCATION/TRAINING PROGRAM

## 2023-11-24 RX ORDER — POTASSIUM CHLORIDE 750 MG/1
40 TABLET, FILM COATED, EXTENDED RELEASE ORAL EVERY 4 HOURS
Status: COMPLETED | OUTPATIENT
Start: 2023-11-24 | End: 2023-11-24

## 2023-11-24 RX ORDER — SODIUM PHOSPHATE IN 0.9 % NACL 15MMOL/100
15 PLASTIC BAG, INJECTION (ML) INTRAVENOUS
Status: DISCONTINUED | OUTPATIENT
Start: 2023-11-24 | End: 2023-11-24

## 2023-11-24 RX ORDER — SODIUM PHOSPHATE IN 0.9 % NACL 15MMOL/100
15 PLASTIC BAG, INJECTION (ML) INTRAVENOUS
Status: COMPLETED | OUTPATIENT
Start: 2023-11-24 | End: 2023-11-25

## 2023-11-24 RX ORDER — FENTANYL/ROPIVACAINE/NS/PF 2-625MCG/1
15 PLASTIC BAG, INJECTION (ML) EPIDURAL
Status: DISCONTINUED | OUTPATIENT
Start: 2023-11-24 | End: 2023-11-24 | Stop reason: SDUPTHER

## 2023-11-24 RX ORDER — WARFARIN SODIUM 5 MG/1
5 TABLET ORAL
Status: COMPLETED | OUTPATIENT
Start: 2023-11-24 | End: 2023-11-24

## 2023-11-24 RX ADMIN — LEVOTHYROXINE SODIUM 50 MCG: 50 TABLET ORAL at 06:00

## 2023-11-24 RX ADMIN — Medication 2 PACKET: at 11:19

## 2023-11-24 RX ADMIN — LEVETIRACETAM 500 MG: 500 TABLET, FILM COATED ORAL at 20:18

## 2023-11-24 RX ADMIN — SODIUM PHOSPHATE, MONOBASIC, MONOHYDRATE AND SODIUM PHOSPHATE, DIBASIC, ANHYDROUS 15 MMOL: 142; 276 INJECTION, SOLUTION INTRAVENOUS at 23:35

## 2023-11-24 RX ADMIN — WARFARIN SODIUM 5 MG: 5 TABLET ORAL at 17:30

## 2023-11-24 RX ADMIN — POTASSIUM CHLORIDE 40 MEQ: 750 TABLET, EXTENDED RELEASE ORAL at 15:36

## 2023-11-24 RX ADMIN — CHOLESTYRAMINE 4 G: 4 POWDER, FOR SUSPENSION ORAL at 08:35

## 2023-11-24 RX ADMIN — POTASSIUM CHLORIDE 40 MEQ: 750 TABLET, EXTENDED RELEASE ORAL at 11:18

## 2023-11-24 RX ADMIN — ATORVASTATIN CALCIUM 40 MG: 20 TABLET, FILM COATED ORAL at 20:18

## 2023-11-24 RX ADMIN — MEMANTINE HYDROCHLORIDE 10 MG: 10 TABLET, FILM COATED ORAL at 20:18

## 2023-11-24 RX ADMIN — METOPROLOL SUCCINATE 25 MG: 25 TABLET, EXTENDED RELEASE ORAL at 08:35

## 2023-11-24 RX ADMIN — FLUOXETINE HYDROCHLORIDE 10 MG: 10 CAPSULE ORAL at 08:35

## 2023-11-24 RX ADMIN — MEMANTINE HYDROCHLORIDE 10 MG: 10 TABLET, FILM COATED ORAL at 08:35

## 2023-11-24 RX ADMIN — LEVETIRACETAM 500 MG: 500 TABLET, FILM COATED ORAL at 08:35

## 2023-11-24 RX ADMIN — DONEPEZIL HYDROCHLORIDE 10 MG: 10 TABLET, FILM COATED ORAL at 20:18

## 2023-11-25 LAB
ALBUMIN SERPL-MCNC: 3.2 G/DL (ref 3.5–5.2)
ANION GAP SERPL CALCULATED.3IONS-SCNC: 10 MMOL/L (ref 5–15)
BUN SERPL-MCNC: 10 MG/DL (ref 8–23)
BUN/CREAT SERPL: 10.6 (ref 7–25)
CALCIUM SPEC-SCNC: 8.1 MG/DL (ref 8.6–10.5)
CHLORIDE SERPL-SCNC: 107 MMOL/L (ref 98–107)
CO2 SERPL-SCNC: 23 MMOL/L (ref 22–29)
CREAT SERPL-MCNC: 0.94 MG/DL (ref 0.76–1.27)
DEPRECATED RDW RBC AUTO: 44 FL (ref 37–54)
EGFRCR SERPLBLD CKD-EPI 2021: 84 ML/MIN/1.73
EOSINOPHIL # BLD MANUAL: 0.23 10*3/MM3 (ref 0–0.4)
EOSINOPHIL NFR BLD MANUAL: 2 % (ref 0.3–6.2)
ERYTHROCYTE [DISTWIDTH] IN BLOOD BY AUTOMATED COUNT: 13.5 % (ref 12.3–15.4)
GLUCOSE BLDC GLUCOMTR-MCNC: 114 MG/DL (ref 70–130)
GLUCOSE BLDC GLUCOMTR-MCNC: 160 MG/DL (ref 70–130)
GLUCOSE BLDC GLUCOMTR-MCNC: 167 MG/DL (ref 70–130)
GLUCOSE BLDC GLUCOMTR-MCNC: 203 MG/DL (ref 70–130)
GLUCOSE SERPL-MCNC: 123 MG/DL (ref 65–99)
HBA1C MFR BLD: 6.1 % (ref 4.8–5.6)
HCT VFR BLD AUTO: 40.3 % (ref 37.5–51)
HGB BLD-MCNC: 13.3 G/DL (ref 13–17.7)
INR PPP: 2.36 (ref 0.9–1.1)
LYMPHOCYTES # BLD MANUAL: 0.58 10*3/MM3 (ref 0.7–3.1)
LYMPHOCYTES NFR BLD MANUAL: 6 % (ref 5–12)
MAGNESIUM SERPL-MCNC: 2 MG/DL (ref 1.6–2.4)
MCH RBC QN AUTO: 28.8 PG (ref 26.6–33)
MCHC RBC AUTO-ENTMCNC: 33 G/DL (ref 31.5–35.7)
MCV RBC AUTO: 87.2 FL (ref 79–97)
MONOCYTES # BLD: 0.69 10*3/MM3 (ref 0.1–0.9)
NEUTROPHILS # BLD AUTO: 10.01 10*3/MM3 (ref 1.7–7)
NEUTROPHILS NFR BLD MANUAL: 87 % (ref 42.7–76)
NRBC BLD AUTO-RTO: 0 /100 WBC (ref 0–0.2)
PHOSPHATE SERPL-MCNC: 1.6 MG/DL (ref 2.5–4.5)
PHOSPHATE SERPL-MCNC: 1.8 MG/DL (ref 2.5–4.5)
PHOSPHATE SERPL-MCNC: 2.3 MG/DL (ref 2.5–4.5)
PLAT MORPH BLD: NORMAL
PLATELET # BLD AUTO: 250 10*3/MM3 (ref 140–450)
PMV BLD AUTO: 10.1 FL (ref 6–12)
POTASSIUM SERPL-SCNC: 5.2 MMOL/L (ref 3.5–5.2)
PROTHROMBIN TIME: 26.3 SECONDS (ref 11.7–14.2)
RBC # BLD AUTO: 4.62 10*6/MM3 (ref 4.14–5.8)
RBC MORPH BLD: NORMAL
SODIUM SERPL-SCNC: 140 MMOL/L (ref 136–145)
VARIANT LYMPHS NFR BLD MANUAL: 5 % (ref 19.6–45.3)
WBC MORPH BLD: NORMAL
WBC NRBC COR # BLD AUTO: 11.51 10*3/MM3 (ref 3.4–10.8)

## 2023-11-25 PROCEDURE — 83735 ASSAY OF MAGNESIUM: CPT | Performed by: HOSPITALIST

## 2023-11-25 PROCEDURE — 25810000003 LACTATED RINGERS PER 1000 ML: Performed by: STUDENT IN AN ORGANIZED HEALTH CARE EDUCATION/TRAINING PROGRAM

## 2023-11-25 PROCEDURE — 97530 THERAPEUTIC ACTIVITIES: CPT

## 2023-11-25 PROCEDURE — 80069 RENAL FUNCTION PANEL: CPT | Performed by: HOSPITALIST

## 2023-11-25 PROCEDURE — 84100 ASSAY OF PHOSPHORUS: CPT | Performed by: STUDENT IN AN ORGANIZED HEALTH CARE EDUCATION/TRAINING PROGRAM

## 2023-11-25 PROCEDURE — 25810000003 SODIUM CHLORIDE 0.9 % SOLUTION: Performed by: STUDENT IN AN ORGANIZED HEALTH CARE EDUCATION/TRAINING PROGRAM

## 2023-11-25 PROCEDURE — 85610 PROTHROMBIN TIME: CPT | Performed by: INTERNAL MEDICINE

## 2023-11-25 PROCEDURE — 97116 GAIT TRAINING THERAPY: CPT

## 2023-11-25 PROCEDURE — 85025 COMPLETE CBC W/AUTO DIFF WBC: CPT | Performed by: STUDENT IN AN ORGANIZED HEALTH CARE EDUCATION/TRAINING PROGRAM

## 2023-11-25 PROCEDURE — 85007 BL SMEAR W/DIFF WBC COUNT: CPT | Performed by: STUDENT IN AN ORGANIZED HEALTH CARE EDUCATION/TRAINING PROGRAM

## 2023-11-25 PROCEDURE — 83036 HEMOGLOBIN GLYCOSYLATED A1C: CPT | Performed by: STUDENT IN AN ORGANIZED HEALTH CARE EDUCATION/TRAINING PROGRAM

## 2023-11-25 PROCEDURE — 82948 REAGENT STRIP/BLOOD GLUCOSE: CPT

## 2023-11-25 PROCEDURE — 63710000001 INSULIN LISPRO (HUMAN) PER 5 UNITS: Performed by: HOSPITALIST

## 2023-11-25 PROCEDURE — 97166 OT EVAL MOD COMPLEX 45 MIN: CPT

## 2023-11-25 RX ORDER — WARFARIN SODIUM 2.5 MG/1
2.5 TABLET ORAL
Status: COMPLETED | OUTPATIENT
Start: 2023-11-25 | End: 2023-11-25

## 2023-11-25 RX ORDER — SODIUM CHLORIDE, SODIUM LACTATE, POTASSIUM CHLORIDE, CALCIUM CHLORIDE 600; 310; 30; 20 MG/100ML; MG/100ML; MG/100ML; MG/100ML
75 INJECTION, SOLUTION INTRAVENOUS ONCE
Status: COMPLETED | OUTPATIENT
Start: 2023-11-25 | End: 2023-11-25

## 2023-11-25 RX ADMIN — LEVOTHYROXINE SODIUM 50 MCG: 50 TABLET ORAL at 06:01

## 2023-11-25 RX ADMIN — MEMANTINE HYDROCHLORIDE 10 MG: 10 TABLET, FILM COATED ORAL at 08:16

## 2023-11-25 RX ADMIN — METOPROLOL SUCCINATE 25 MG: 25 TABLET, EXTENDED RELEASE ORAL at 08:16

## 2023-11-25 RX ADMIN — FLUOXETINE HYDROCHLORIDE 10 MG: 10 CAPSULE ORAL at 08:16

## 2023-11-25 RX ADMIN — WARFARIN 2.5 MG: 2.5 TABLET ORAL at 17:26

## 2023-11-25 RX ADMIN — SODIUM PHOSPHATE, MONOBASIC, MONOHYDRATE AND SODIUM PHOSPHATE, DIBASIC, ANHYDROUS 15 MMOL: 142; 276 INJECTION, SOLUTION INTRAVENOUS at 02:45

## 2023-11-25 RX ADMIN — LEVETIRACETAM 500 MG: 500 TABLET, FILM COATED ORAL at 08:16

## 2023-11-25 RX ADMIN — MEMANTINE HYDROCHLORIDE 10 MG: 10 TABLET, FILM COATED ORAL at 20:41

## 2023-11-25 RX ADMIN — LEVETIRACETAM 500 MG: 500 TABLET, FILM COATED ORAL at 20:41

## 2023-11-25 RX ADMIN — Medication 2 PACKET: at 16:07

## 2023-11-25 RX ADMIN — SODIUM PHOSPHATE, MONOBASIC, MONOHYDRATE AND SODIUM PHOSPHATE, DIBASIC, ANHYDROUS 15 MMOL: 142; 276 INJECTION, SOLUTION INTRAVENOUS at 05:48

## 2023-11-25 RX ADMIN — CHOLESTYRAMINE 4 G: 4 POWDER, FOR SUSPENSION ORAL at 08:16

## 2023-11-25 RX ADMIN — SODIUM CHLORIDE, POTASSIUM CHLORIDE, SODIUM LACTATE AND CALCIUM CHLORIDE 75 ML/HR: 600; 310; 30; 20 INJECTION, SOLUTION INTRAVENOUS at 11:45

## 2023-11-25 RX ADMIN — ATORVASTATIN CALCIUM 40 MG: 20 TABLET, FILM COATED ORAL at 20:41

## 2023-11-25 RX ADMIN — INSULIN LISPRO 3 UNITS: 100 INJECTION, SOLUTION INTRAVENOUS; SUBCUTANEOUS at 11:44

## 2023-11-25 RX ADMIN — DONEPEZIL HYDROCHLORIDE 10 MG: 10 TABLET, FILM COATED ORAL at 20:41

## 2023-11-26 LAB
ALBUMIN SERPL-MCNC: 3.1 G/DL (ref 3.5–5.2)
ANION GAP SERPL CALCULATED.3IONS-SCNC: 9 MMOL/L (ref 5–15)
BASOPHILS # BLD AUTO: 0.04 10*3/MM3 (ref 0–0.2)
BASOPHILS NFR BLD AUTO: 0.4 % (ref 0–1.5)
BUN SERPL-MCNC: 12 MG/DL (ref 8–23)
BUN/CREAT SERPL: 12.8 (ref 7–25)
CALCIUM SPEC-SCNC: 8.1 MG/DL (ref 8.6–10.5)
CHLORIDE SERPL-SCNC: 107 MMOL/L (ref 98–107)
CO2 SERPL-SCNC: 25 MMOL/L (ref 22–29)
CREAT SERPL-MCNC: 0.94 MG/DL (ref 0.76–1.27)
DEPRECATED RDW RBC AUTO: 42.8 FL (ref 37–54)
EGFRCR SERPLBLD CKD-EPI 2021: 84 ML/MIN/1.73
EOSINOPHIL # BLD AUTO: 0.57 10*3/MM3 (ref 0–0.4)
EOSINOPHIL NFR BLD AUTO: 5.3 % (ref 0.3–6.2)
ERYTHROCYTE [DISTWIDTH] IN BLOOD BY AUTOMATED COUNT: 13.2 % (ref 12.3–15.4)
GLUCOSE BLDC GLUCOMTR-MCNC: 105 MG/DL (ref 70–130)
GLUCOSE BLDC GLUCOMTR-MCNC: 157 MG/DL (ref 70–130)
GLUCOSE BLDC GLUCOMTR-MCNC: 166 MG/DL (ref 70–130)
GLUCOSE BLDC GLUCOMTR-MCNC: 94 MG/DL (ref 70–130)
GLUCOSE SERPL-MCNC: 114 MG/DL (ref 65–99)
HCT VFR BLD AUTO: 37.6 % (ref 37.5–51)
HGB BLD-MCNC: 12.1 G/DL (ref 13–17.7)
IMM GRANULOCYTES # BLD AUTO: 0.04 10*3/MM3 (ref 0–0.05)
IMM GRANULOCYTES NFR BLD AUTO: 0.4 % (ref 0–0.5)
INR PPP: 2.64 (ref 0.9–1.1)
LYMPHOCYTES # BLD AUTO: 3.02 10*3/MM3 (ref 0.7–3.1)
LYMPHOCYTES NFR BLD AUTO: 28 % (ref 19.6–45.3)
MAGNESIUM SERPL-MCNC: 1.7 MG/DL (ref 1.6–2.4)
MCH RBC QN AUTO: 28.3 PG (ref 26.6–33)
MCHC RBC AUTO-ENTMCNC: 32.2 G/DL (ref 31.5–35.7)
MCV RBC AUTO: 88.1 FL (ref 79–97)
MONOCYTES # BLD AUTO: 1.3 10*3/MM3 (ref 0.1–0.9)
MONOCYTES NFR BLD AUTO: 12 % (ref 5–12)
NEUTROPHILS NFR BLD AUTO: 5.83 10*3/MM3 (ref 1.7–7)
NEUTROPHILS NFR BLD AUTO: 53.9 % (ref 42.7–76)
NRBC BLD AUTO-RTO: 0 /100 WBC (ref 0–0.2)
PHOSPHATE SERPL-MCNC: 2.4 MG/DL (ref 2.5–4.5)
PHOSPHATE SERPL-MCNC: 3.1 MG/DL (ref 2.5–4.5)
PLATELET # BLD AUTO: 238 10*3/MM3 (ref 140–450)
PMV BLD AUTO: 9.9 FL (ref 6–12)
POTASSIUM SERPL-SCNC: 3.8 MMOL/L (ref 3.5–5.2)
PROTHROMBIN TIME: 28.7 SECONDS (ref 11.7–14.2)
RBC # BLD AUTO: 4.27 10*6/MM3 (ref 4.14–5.8)
SODIUM SERPL-SCNC: 141 MMOL/L (ref 136–145)
WBC NRBC COR # BLD AUTO: 10.8 10*3/MM3 (ref 3.4–10.8)

## 2023-11-26 PROCEDURE — 85610 PROTHROMBIN TIME: CPT | Performed by: INTERNAL MEDICINE

## 2023-11-26 PROCEDURE — 36415 COLL VENOUS BLD VENIPUNCTURE: CPT | Performed by: HOSPITALIST

## 2023-11-26 PROCEDURE — 63710000001 INSULIN LISPRO (HUMAN) PER 5 UNITS: Performed by: HOSPITALIST

## 2023-11-26 PROCEDURE — 25810000003 SODIUM CHLORIDE 0.9 % SOLUTION: Performed by: STUDENT IN AN ORGANIZED HEALTH CARE EDUCATION/TRAINING PROGRAM

## 2023-11-26 PROCEDURE — 85025 COMPLETE CBC W/AUTO DIFF WBC: CPT | Performed by: STUDENT IN AN ORGANIZED HEALTH CARE EDUCATION/TRAINING PROGRAM

## 2023-11-26 PROCEDURE — 80069 RENAL FUNCTION PANEL: CPT | Performed by: HOSPITALIST

## 2023-11-26 PROCEDURE — 83735 ASSAY OF MAGNESIUM: CPT | Performed by: HOSPITALIST

## 2023-11-26 PROCEDURE — 84100 ASSAY OF PHOSPHORUS: CPT | Performed by: STUDENT IN AN ORGANIZED HEALTH CARE EDUCATION/TRAINING PROGRAM

## 2023-11-26 PROCEDURE — 25810000003 LACTATED RINGERS PER 1000 ML: Performed by: STUDENT IN AN ORGANIZED HEALTH CARE EDUCATION/TRAINING PROGRAM

## 2023-11-26 PROCEDURE — 82948 REAGENT STRIP/BLOOD GLUCOSE: CPT

## 2023-11-26 RX ORDER — HYDRALAZINE HYDROCHLORIDE 25 MG/1
25 TABLET, FILM COATED ORAL EVERY 8 HOURS PRN
Status: DISCONTINUED | OUTPATIENT
Start: 2023-11-26 | End: 2023-11-27 | Stop reason: HOSPADM

## 2023-11-26 RX ORDER — WARFARIN SODIUM 2.5 MG/1
2.5 TABLET ORAL
Status: COMPLETED | OUTPATIENT
Start: 2023-11-26 | End: 2023-11-26

## 2023-11-26 RX ORDER — SODIUM PHOSPHATE IN 0.9 % NACL 15MMOL/100
15 PLASTIC BAG, INJECTION (ML) INTRAVENOUS
Status: COMPLETED | OUTPATIENT
Start: 2023-11-26 | End: 2023-11-26

## 2023-11-26 RX ORDER — SODIUM CHLORIDE, SODIUM LACTATE, POTASSIUM CHLORIDE, CALCIUM CHLORIDE 600; 310; 30; 20 MG/100ML; MG/100ML; MG/100ML; MG/100ML
75 INJECTION, SOLUTION INTRAVENOUS CONTINUOUS
Status: DISCONTINUED | OUTPATIENT
Start: 2023-11-26 | End: 2023-11-27 | Stop reason: HOSPADM

## 2023-11-26 RX ADMIN — CHOLESTYRAMINE 4 G: 4 POWDER, FOR SUSPENSION ORAL at 10:43

## 2023-11-26 RX ADMIN — SODIUM PHOSPHATE, MONOBASIC, MONOHYDRATE AND SODIUM PHOSPHATE, DIBASIC, ANHYDROUS 15 MMOL: 142; 276 INJECTION, SOLUTION INTRAVENOUS at 03:52

## 2023-11-26 RX ADMIN — ATORVASTATIN CALCIUM 40 MG: 20 TABLET, FILM COATED ORAL at 20:29

## 2023-11-26 RX ADMIN — WARFARIN 2.5 MG: 2.5 TABLET ORAL at 17:53

## 2023-11-26 RX ADMIN — INSULIN LISPRO 2 UNITS: 100 INJECTION, SOLUTION INTRAVENOUS; SUBCUTANEOUS at 16:43

## 2023-11-26 RX ADMIN — METOPROLOL SUCCINATE 25 MG: 25 TABLET, EXTENDED RELEASE ORAL at 08:09

## 2023-11-26 RX ADMIN — MEMANTINE HYDROCHLORIDE 10 MG: 10 TABLET, FILM COATED ORAL at 20:29

## 2023-11-26 RX ADMIN — LEVETIRACETAM 500 MG: 500 TABLET, FILM COATED ORAL at 08:09

## 2023-11-26 RX ADMIN — LEVETIRACETAM 500 MG: 500 TABLET, FILM COATED ORAL at 20:29

## 2023-11-26 RX ADMIN — INSULIN LISPRO 2 UNITS: 100 INJECTION, SOLUTION INTRAVENOUS; SUBCUTANEOUS at 12:22

## 2023-11-26 RX ADMIN — DONEPEZIL HYDROCHLORIDE 10 MG: 10 TABLET, FILM COATED ORAL at 20:29

## 2023-11-26 RX ADMIN — SODIUM CHLORIDE, POTASSIUM CHLORIDE, SODIUM LACTATE AND CALCIUM CHLORIDE 75 ML/HR: 600; 310; 30; 20 INJECTION, SOLUTION INTRAVENOUS at 10:42

## 2023-11-26 RX ADMIN — SODIUM PHOSPHATE, MONOBASIC, MONOHYDRATE AND SODIUM PHOSPHATE, DIBASIC, ANHYDROUS 15 MMOL: 142; 276 INJECTION, SOLUTION INTRAVENOUS at 00:48

## 2023-11-26 RX ADMIN — MEMANTINE HYDROCHLORIDE 10 MG: 10 TABLET, FILM COATED ORAL at 08:09

## 2023-11-26 RX ADMIN — LEVOTHYROXINE SODIUM 50 MCG: 50 TABLET ORAL at 06:20

## 2023-11-26 RX ADMIN — HYDRALAZINE HYDROCHLORIDE 25 MG: 25 TABLET, FILM COATED ORAL at 06:20

## 2023-11-26 RX ADMIN — FLUOXETINE HYDROCHLORIDE 10 MG: 10 CAPSULE ORAL at 08:09

## 2023-11-27 ENCOUNTER — READMISSION MANAGEMENT (OUTPATIENT)
Dept: CALL CENTER | Facility: HOSPITAL | Age: 78
End: 2023-11-27
Payer: MEDICARE

## 2023-11-27 VITALS
HEART RATE: 59 BPM | HEIGHT: 61 IN | TEMPERATURE: 97.5 F | RESPIRATION RATE: 16 BRPM | BODY MASS INDEX: 39.65 KG/M2 | DIASTOLIC BLOOD PRESSURE: 77 MMHG | WEIGHT: 210 LBS | SYSTOLIC BLOOD PRESSURE: 139 MMHG | OXYGEN SATURATION: 98 %

## 2023-11-27 LAB
ALBUMIN SERPL-MCNC: 2.7 G/DL (ref 3.5–5.2)
ANION GAP SERPL CALCULATED.3IONS-SCNC: 9.3 MMOL/L (ref 5–15)
BASOPHILS # BLD AUTO: 0.05 10*3/MM3 (ref 0–0.2)
BASOPHILS NFR BLD AUTO: 0.5 % (ref 0–1.5)
BUN SERPL-MCNC: 12 MG/DL (ref 8–23)
BUN/CREAT SERPL: 13.3 (ref 7–25)
CALCIUM SPEC-SCNC: 8.4 MG/DL (ref 8.6–10.5)
CHLORIDE SERPL-SCNC: 109 MMOL/L (ref 98–107)
CO2 SERPL-SCNC: 21.7 MMOL/L (ref 22–29)
CREAT SERPL-MCNC: 0.9 MG/DL (ref 0.76–1.27)
DEPRECATED RDW RBC AUTO: 39.4 FL (ref 37–54)
EGFRCR SERPLBLD CKD-EPI 2021: 88.5 ML/MIN/1.73
EOSINOPHIL # BLD AUTO: 0.49 10*3/MM3 (ref 0–0.4)
EOSINOPHIL NFR BLD AUTO: 5.2 % (ref 0.3–6.2)
ERYTHROCYTE [DISTWIDTH] IN BLOOD BY AUTOMATED COUNT: 12.8 % (ref 12.3–15.4)
GLUCOSE BLDC GLUCOMTR-MCNC: 108 MG/DL (ref 70–130)
GLUCOSE BLDC GLUCOMTR-MCNC: 146 MG/DL (ref 70–130)
GLUCOSE SERPL-MCNC: 96 MG/DL (ref 65–99)
HCT VFR BLD AUTO: 36.8 % (ref 37.5–51)
HGB BLD-MCNC: 11.5 G/DL (ref 13–17.7)
IMM GRANULOCYTES # BLD AUTO: 0.04 10*3/MM3 (ref 0–0.05)
IMM GRANULOCYTES NFR BLD AUTO: 0.4 % (ref 0–0.5)
INR PPP: 2.49 (ref 0.9–1.1)
LYMPHOCYTES # BLD AUTO: 2.47 10*3/MM3 (ref 0.7–3.1)
LYMPHOCYTES NFR BLD AUTO: 26.4 % (ref 19.6–45.3)
MAGNESIUM SERPL-MCNC: 1.7 MG/DL (ref 1.6–2.4)
MCH RBC QN AUTO: 26.4 PG (ref 26.6–33)
MCHC RBC AUTO-ENTMCNC: 31.3 G/DL (ref 31.5–35.7)
MCV RBC AUTO: 84.6 FL (ref 79–97)
MONOCYTES # BLD AUTO: 0.92 10*3/MM3 (ref 0.1–0.9)
MONOCYTES NFR BLD AUTO: 9.9 % (ref 5–12)
NEUTROPHILS NFR BLD AUTO: 5.37 10*3/MM3 (ref 1.7–7)
NEUTROPHILS NFR BLD AUTO: 57.6 % (ref 42.7–76)
NRBC BLD AUTO-RTO: 0 /100 WBC (ref 0–0.2)
PHOSPHATE SERPL-MCNC: 2.1 MG/DL (ref 2.5–4.5)
PLATELET # BLD AUTO: 282 10*3/MM3 (ref 140–450)
PMV BLD AUTO: 9.7 FL (ref 6–12)
POTASSIUM SERPL-SCNC: 4.1 MMOL/L (ref 3.5–5.2)
PROTHROMBIN TIME: 27.4 SECONDS (ref 11.7–14.2)
RBC # BLD AUTO: 4.35 10*6/MM3 (ref 4.14–5.8)
SODIUM SERPL-SCNC: 140 MMOL/L (ref 136–145)
WBC NRBC COR # BLD AUTO: 9.34 10*3/MM3 (ref 3.4–10.8)

## 2023-11-27 PROCEDURE — 97116 GAIT TRAINING THERAPY: CPT

## 2023-11-27 PROCEDURE — 82948 REAGENT STRIP/BLOOD GLUCOSE: CPT

## 2023-11-27 PROCEDURE — 85025 COMPLETE CBC W/AUTO DIFF WBC: CPT | Performed by: STUDENT IN AN ORGANIZED HEALTH CARE EDUCATION/TRAINING PROGRAM

## 2023-11-27 PROCEDURE — 80069 RENAL FUNCTION PANEL: CPT | Performed by: HOSPITALIST

## 2023-11-27 PROCEDURE — 25810000003 LACTATED RINGERS PER 1000 ML: Performed by: STUDENT IN AN ORGANIZED HEALTH CARE EDUCATION/TRAINING PROGRAM

## 2023-11-27 PROCEDURE — 36415 COLL VENOUS BLD VENIPUNCTURE: CPT | Performed by: HOSPITALIST

## 2023-11-27 PROCEDURE — 25810000003 SODIUM CHLORIDE 0.9 % SOLUTION: Performed by: STUDENT IN AN ORGANIZED HEALTH CARE EDUCATION/TRAINING PROGRAM

## 2023-11-27 PROCEDURE — 83735 ASSAY OF MAGNESIUM: CPT | Performed by: HOSPITALIST

## 2023-11-27 PROCEDURE — 97530 THERAPEUTIC ACTIVITIES: CPT

## 2023-11-27 PROCEDURE — 85610 PROTHROMBIN TIME: CPT | Performed by: INTERNAL MEDICINE

## 2023-11-27 PROCEDURE — 97535 SELF CARE MNGMENT TRAINING: CPT

## 2023-11-27 RX ORDER — SODIUM PHOSPHATE IN 0.9 % NACL 15MMOL/100
15 PLASTIC BAG, INJECTION (ML) INTRAVENOUS ONCE
Status: COMPLETED | OUTPATIENT
Start: 2023-11-27 | End: 2023-11-27

## 2023-11-27 RX ORDER — LISINOPRIL 10 MG/1
10 TABLET ORAL DAILY
Qty: 30 TABLET | Refills: 0 | Status: SHIPPED | OUTPATIENT
Start: 2023-11-27

## 2023-11-27 RX ORDER — FLUOXETINE 10 MG/1
10 CAPSULE ORAL DAILY
Qty: 30 CAPSULE | Refills: 0 | Status: SHIPPED | OUTPATIENT
Start: 2023-11-27 | End: 2023-12-27

## 2023-11-27 RX ORDER — METOPROLOL SUCCINATE 25 MG/1
25 TABLET, EXTENDED RELEASE ORAL DAILY
Qty: 30 TABLET | Refills: 0 | Status: SHIPPED | OUTPATIENT
Start: 2023-11-27

## 2023-11-27 RX ADMIN — METOPROLOL SUCCINATE 25 MG: 25 TABLET, EXTENDED RELEASE ORAL at 08:14

## 2023-11-27 RX ADMIN — MEMANTINE HYDROCHLORIDE 10 MG: 10 TABLET, FILM COATED ORAL at 08:14

## 2023-11-27 RX ADMIN — LEVETIRACETAM 500 MG: 500 TABLET, FILM COATED ORAL at 08:14

## 2023-11-27 RX ADMIN — SODIUM CHLORIDE, POTASSIUM CHLORIDE, SODIUM LACTATE AND CALCIUM CHLORIDE 75 ML/HR: 600; 310; 30; 20 INJECTION, SOLUTION INTRAVENOUS at 02:21

## 2023-11-27 RX ADMIN — CHOLESTYRAMINE 4 G: 4 POWDER, FOR SUSPENSION ORAL at 09:56

## 2023-11-27 RX ADMIN — SODIUM PHOSPHATE, MONOBASIC, MONOHYDRATE AND SODIUM PHOSPHATE, DIBASIC, ANHYDROUS 15 MMOL: 142; 276 INJECTION, SOLUTION INTRAVENOUS at 11:35

## 2023-11-27 RX ADMIN — FLUOXETINE HYDROCHLORIDE 10 MG: 10 CAPSULE ORAL at 08:14

## 2023-11-27 RX ADMIN — LEVOTHYROXINE SODIUM 50 MCG: 50 TABLET ORAL at 05:50

## 2023-11-28 ENCOUNTER — TRANSITIONAL CARE MANAGEMENT TELEPHONE ENCOUNTER (OUTPATIENT)
Dept: CALL CENTER | Facility: HOSPITAL | Age: 78
End: 2023-11-28
Payer: MEDICARE

## 2023-11-29 ENCOUNTER — TRANSITIONAL CARE MANAGEMENT TELEPHONE ENCOUNTER (OUTPATIENT)
Dept: CALL CENTER | Facility: HOSPITAL | Age: 78
End: 2023-11-29
Payer: MEDICARE

## 2023-11-29 ENCOUNTER — OFFICE VISIT (OUTPATIENT)
Dept: INTERNAL MEDICINE | Facility: CLINIC | Age: 78
End: 2023-11-29
Payer: MEDICARE

## 2023-11-29 VITALS
DIASTOLIC BLOOD PRESSURE: 70 MMHG | SYSTOLIC BLOOD PRESSURE: 116 MMHG | TEMPERATURE: 98.4 F | OXYGEN SATURATION: 98 % | WEIGHT: 217.3 LBS | HEART RATE: 85 BPM | BODY MASS INDEX: 41.03 KG/M2 | HEIGHT: 61 IN

## 2023-11-29 DIAGNOSIS — I10 PRIMARY HYPERTENSION: ICD-10-CM

## 2023-11-29 DIAGNOSIS — E11.40 CONTROLLED TYPE 2 DIABETES MELLITUS WITH DIABETIC NEUROPATHY, WITHOUT LONG-TERM CURRENT USE OF INSULIN: ICD-10-CM

## 2023-11-29 DIAGNOSIS — E83.51 HYPOCALCEMIA: ICD-10-CM

## 2023-11-29 DIAGNOSIS — Z23 NEED FOR INFLUENZA VACCINATION: ICD-10-CM

## 2023-11-29 DIAGNOSIS — E87.6 HYPOKALEMIA: Primary | ICD-10-CM

## 2023-11-29 DIAGNOSIS — R19.7 DIARRHEA, UNSPECIFIED TYPE: ICD-10-CM

## 2023-11-30 ENCOUNTER — TELEPHONE (OUTPATIENT)
Dept: GASTROENTEROLOGY | Facility: CLINIC | Age: 78
End: 2023-11-30
Payer: MEDICARE

## 2023-11-30 ENCOUNTER — OFFICE VISIT (OUTPATIENT)
Dept: CARDIOLOGY | Facility: CLINIC | Age: 78
End: 2023-11-30
Payer: MEDICARE

## 2023-11-30 ENCOUNTER — TELEPHONE (OUTPATIENT)
Dept: INTERNAL MEDICINE | Facility: CLINIC | Age: 78
End: 2023-11-30
Payer: MEDICARE

## 2023-11-30 VITALS
WEIGHT: 214 LBS | HEART RATE: 57 BPM | HEIGHT: 60 IN | BODY MASS INDEX: 42.01 KG/M2 | DIASTOLIC BLOOD PRESSURE: 72 MMHG | SYSTOLIC BLOOD PRESSURE: 128 MMHG | OXYGEN SATURATION: 95 %

## 2023-11-30 DIAGNOSIS — I10 PRIMARY HYPERTENSION: ICD-10-CM

## 2023-11-30 DIAGNOSIS — Z86.73 HISTORY OF STROKE: ICD-10-CM

## 2023-11-30 DIAGNOSIS — I48.0 PAROXYSMAL ATRIAL FIBRILLATION: Primary | ICD-10-CM

## 2023-11-30 LAB
ALBUMIN SERPL-MCNC: 3.8 G/DL (ref 3.5–5.2)
ALBUMIN/GLOB SERPL: 1.4 G/DL
ALP SERPL-CCNC: 98 U/L (ref 39–117)
ALT SERPL-CCNC: 37 U/L (ref 1–41)
AST SERPL-CCNC: 27 U/L (ref 1–40)
BASOPHILS # BLD AUTO: 0.05 10*3/MM3 (ref 0–0.2)
BASOPHILS NFR BLD AUTO: 0.4 % (ref 0–1.5)
BILIRUB SERPL-MCNC: 0.3 MG/DL (ref 0–1.2)
BUN SERPL-MCNC: 14 MG/DL (ref 8–23)
BUN/CREAT SERPL: 13.6 (ref 7–25)
CALCIUM SERPL-MCNC: 9.5 MG/DL (ref 8.6–10.5)
CHLORIDE SERPL-SCNC: 107 MMOL/L (ref 98–107)
CO2 SERPL-SCNC: 28.3 MMOL/L (ref 22–29)
CREAT SERPL-MCNC: 1.03 MG/DL (ref 0.76–1.27)
EGFRCR SERPLBLD CKD-EPI 2021: 75.3 ML/MIN/1.73
EOSINOPHIL # BLD AUTO: 0.41 10*3/MM3 (ref 0–0.4)
EOSINOPHIL NFR BLD AUTO: 3.3 % (ref 0.3–6.2)
ERYTHROCYTE [DISTWIDTH] IN BLOOD BY AUTOMATED COUNT: 13.3 % (ref 12.3–15.4)
GLOBULIN SER CALC-MCNC: 2.7 GM/DL
GLUCOSE SERPL-MCNC: 132 MG/DL (ref 65–99)
HCT VFR BLD AUTO: 42.4 % (ref 37.5–51)
HGB BLD-MCNC: 13.9 G/DL (ref 13–17.7)
IMM GRANULOCYTES # BLD AUTO: 0.05 10*3/MM3 (ref 0–0.05)
IMM GRANULOCYTES NFR BLD AUTO: 0.4 % (ref 0–0.5)
LYMPHOCYTES # BLD AUTO: 2.19 10*3/MM3 (ref 0.7–3.1)
LYMPHOCYTES NFR BLD AUTO: 17.8 % (ref 19.6–45.3)
MCH RBC QN AUTO: 28.3 PG (ref 26.6–33)
MCHC RBC AUTO-ENTMCNC: 32.8 G/DL (ref 31.5–35.7)
MCV RBC AUTO: 86.2 FL (ref 79–97)
MONOCYTES # BLD AUTO: 0.83 10*3/MM3 (ref 0.1–0.9)
MONOCYTES NFR BLD AUTO: 6.8 % (ref 5–12)
NEUTROPHILS # BLD AUTO: 8.74 10*3/MM3 (ref 1.7–7)
NEUTROPHILS NFR BLD AUTO: 71.3 % (ref 42.7–76)
NRBC BLD AUTO-RTO: 0.1 /100 WBC (ref 0–0.2)
PLATELET # BLD AUTO: 399 10*3/MM3 (ref 140–450)
POTASSIUM SERPL-SCNC: 3.9 MMOL/L (ref 3.5–5.2)
PROT SERPL-MCNC: 6.5 G/DL (ref 6–8.5)
RBC # BLD AUTO: 4.92 10*6/MM3 (ref 4.14–5.8)
SODIUM SERPL-SCNC: 145 MMOL/L (ref 136–145)
WBC # BLD AUTO: 12.27 10*3/MM3 (ref 3.4–10.8)

## 2023-11-30 PROCEDURE — 3074F SYST BP LT 130 MM HG: CPT | Performed by: NURSE PRACTITIONER

## 2023-11-30 PROCEDURE — 3078F DIAST BP <80 MM HG: CPT | Performed by: NURSE PRACTITIONER

## 2023-11-30 PROCEDURE — 99214 OFFICE O/P EST MOD 30 MIN: CPT | Performed by: NURSE PRACTITIONER

## 2023-12-06 ENCOUNTER — READMISSION MANAGEMENT (OUTPATIENT)
Dept: CALL CENTER | Facility: HOSPITAL | Age: 78
End: 2023-12-06
Payer: MEDICARE

## 2023-12-13 ENCOUNTER — TELEPHONE (OUTPATIENT)
Dept: PHARMACY | Facility: HOSPITAL | Age: 78
End: 2023-12-13
Payer: MEDICARE

## 2023-12-14 ENCOUNTER — TELEPHONE (OUTPATIENT)
Dept: PHARMACY | Facility: HOSPITAL | Age: 78
End: 2023-12-14
Payer: MEDICARE

## 2023-12-18 ENCOUNTER — TELEPHONE (OUTPATIENT)
Dept: INTERNAL MEDICINE | Facility: CLINIC | Age: 78
End: 2023-12-18
Payer: MEDICARE

## 2023-12-18 RX ORDER — METOPROLOL SUCCINATE 25 MG/1
25 TABLET, EXTENDED RELEASE ORAL DAILY
Qty: 90 TABLET | Refills: 1 | Status: SHIPPED | OUTPATIENT
Start: 2023-12-18

## 2023-12-18 RX ORDER — LISINOPRIL 10 MG/1
10 TABLET ORAL DAILY
Qty: 90 TABLET | Refills: 1 | Status: SHIPPED | OUTPATIENT
Start: 2023-12-18

## 2023-12-27 ENCOUNTER — OFFICE VISIT (OUTPATIENT)
Dept: GASTROENTEROLOGY | Facility: CLINIC | Age: 78
End: 2023-12-27
Payer: MEDICARE

## 2023-12-27 VITALS
BODY MASS INDEX: 28.82 KG/M2 | TEMPERATURE: 98.7 F | DIASTOLIC BLOOD PRESSURE: 78 MMHG | OXYGEN SATURATION: 97 % | HEIGHT: 71 IN | SYSTOLIC BLOOD PRESSURE: 114 MMHG | HEART RATE: 57 BPM | WEIGHT: 205.9 LBS

## 2023-12-27 DIAGNOSIS — Z86.010 HISTORY OF COLONIC POLYPS: ICD-10-CM

## 2023-12-27 DIAGNOSIS — K58.0 IRRITABLE BOWEL SYNDROME WITH DIARRHEA: Primary | ICD-10-CM

## 2023-12-27 PROCEDURE — 3078F DIAST BP <80 MM HG: CPT | Performed by: NURSE PRACTITIONER

## 2023-12-27 PROCEDURE — 99213 OFFICE O/P EST LOW 20 MIN: CPT | Performed by: NURSE PRACTITIONER

## 2023-12-27 PROCEDURE — 3074F SYST BP LT 130 MM HG: CPT | Performed by: NURSE PRACTITIONER

## 2023-12-27 RX ORDER — MONTELUKAST SODIUM 4 MG/1
3 TABLET, CHEWABLE ORAL 2 TIMES DAILY
Qty: 180 TABLET | Refills: 5 | Status: SHIPPED | OUTPATIENT
Start: 2023-12-27

## 2024-01-02 RX ORDER — METOPROLOL SUCCINATE 25 MG/1
25 TABLET, EXTENDED RELEASE ORAL DAILY
Qty: 90 TABLET | Refills: 1 | OUTPATIENT
Start: 2024-01-02

## 2024-01-02 RX ORDER — METOPROLOL SUCCINATE 25 MG/1
25 TABLET, EXTENDED RELEASE ORAL DAILY
Qty: 90 TABLET | Refills: 3 | Status: SHIPPED | OUTPATIENT
Start: 2024-01-02

## 2024-01-05 ENCOUNTER — ANTICOAGULATION VISIT (OUTPATIENT)
Dept: PHARMACY | Facility: HOSPITAL | Age: 79
End: 2024-01-05
Payer: MEDICARE

## 2024-01-05 DIAGNOSIS — I48.0 PAROXYSMAL ATRIAL FIBRILLATION: Primary | ICD-10-CM

## 2024-01-05 LAB
INR PPP: 1.7 (ref 0.91–1.09)
PROTHROMBIN TIME: 20.5 SECONDS (ref 10–13.8)

## 2024-01-05 PROCEDURE — 36416 COLLJ CAPILLARY BLOOD SPEC: CPT

## 2024-01-05 PROCEDURE — 85610 PROTHROMBIN TIME: CPT

## 2024-01-05 PROCEDURE — G0463 HOSPITAL OUTPT CLINIC VISIT: HCPCS

## 2024-01-15 RX ORDER — ATORVASTATIN CALCIUM 40 MG/1
TABLET, FILM COATED ORAL
Qty: 90 TABLET | Refills: 1 | Status: SHIPPED | OUTPATIENT
Start: 2024-01-15

## 2024-01-19 ENCOUNTER — APPOINTMENT (OUTPATIENT)
Dept: PHARMACY | Facility: HOSPITAL | Age: 79
End: 2024-01-19
Payer: MEDICARE

## 2024-01-24 ENCOUNTER — APPOINTMENT (OUTPATIENT)
Dept: PHARMACY | Facility: HOSPITAL | Age: 79
End: 2024-01-24
Payer: MEDICARE

## 2024-01-26 ENCOUNTER — ANTICOAGULATION VISIT (OUTPATIENT)
Dept: PHARMACY | Facility: HOSPITAL | Age: 79
End: 2024-01-26
Payer: MEDICARE

## 2024-01-26 DIAGNOSIS — I48.0 PAROXYSMAL ATRIAL FIBRILLATION: Primary | ICD-10-CM

## 2024-01-26 LAB
INR PPP: 1.7 (ref 0.91–1.09)
PROTHROMBIN TIME: 20 SECONDS (ref 10–13.8)

## 2024-01-26 PROCEDURE — G0463 HOSPITAL OUTPT CLINIC VISIT: HCPCS

## 2024-01-26 PROCEDURE — 85610 PROTHROMBIN TIME: CPT

## 2024-01-26 PROCEDURE — 36416 COLLJ CAPILLARY BLOOD SPEC: CPT

## 2024-01-30 DIAGNOSIS — E11.40 CONTROLLED TYPE 2 DIABETES MELLITUS WITH DIABETIC NEUROPATHY, WITHOUT LONG-TERM CURRENT USE OF INSULIN: ICD-10-CM

## 2024-01-30 DIAGNOSIS — I10 PRIMARY HYPERTENSION: ICD-10-CM

## 2024-01-30 DIAGNOSIS — E03.8 OTHER SPECIFIED HYPOTHYROIDISM: Primary | ICD-10-CM

## 2024-01-30 DIAGNOSIS — E78.2 MIXED HYPERLIPIDEMIA: ICD-10-CM

## 2024-01-31 LAB
ALBUMIN SERPL-MCNC: 4 G/DL (ref 3.5–5.2)
ALBUMIN/GLOB SERPL: 1.5 G/DL
ALP SERPL-CCNC: 141 U/L (ref 39–117)
ALT SERPL-CCNC: 36 U/L (ref 1–41)
AST SERPL-CCNC: 20 U/L (ref 1–40)
BASOPHILS # BLD AUTO: 0.05 10*3/MM3 (ref 0–0.2)
BASOPHILS NFR BLD AUTO: 0.4 % (ref 0–1.5)
BILIRUB SERPL-MCNC: 0.3 MG/DL (ref 0–1.2)
BUN SERPL-MCNC: 16 MG/DL (ref 8–23)
BUN/CREAT SERPL: 13.7 (ref 7–25)
CALCIUM SERPL-MCNC: 9.7 MG/DL (ref 8.6–10.5)
CHLORIDE SERPL-SCNC: 111 MMOL/L (ref 98–107)
CHOLEST SERPL-MCNC: 111 MG/DL (ref 0–200)
CO2 SERPL-SCNC: 25.2 MMOL/L (ref 22–29)
CREAT SERPL-MCNC: 1.17 MG/DL (ref 0.76–1.27)
EGFRCR SERPLBLD CKD-EPI 2021: 64.2 ML/MIN/1.73
EOSINOPHIL # BLD AUTO: 0.51 10*3/MM3 (ref 0–0.4)
EOSINOPHIL NFR BLD AUTO: 4.4 % (ref 0.3–6.2)
ERYTHROCYTE [DISTWIDTH] IN BLOOD BY AUTOMATED COUNT: 13.7 % (ref 12.3–15.4)
GLOBULIN SER CALC-MCNC: 2.7 GM/DL
GLUCOSE SERPL-MCNC: 106 MG/DL (ref 65–99)
HBA1C MFR BLD: 5.9 % (ref 4.8–5.6)
HCT VFR BLD AUTO: 44.8 % (ref 37.5–51)
HDLC SERPL-MCNC: 46 MG/DL (ref 40–60)
HGB BLD-MCNC: 14.7 G/DL (ref 13–17.7)
IMM GRANULOCYTES # BLD AUTO: 0.03 10*3/MM3 (ref 0–0.05)
IMM GRANULOCYTES NFR BLD AUTO: 0.3 % (ref 0–0.5)
LDLC SERPL CALC-MCNC: 49 MG/DL (ref 0–100)
LDLC/HDLC SERPL: 1.06 {RATIO}
LYMPHOCYTES # BLD AUTO: 3.66 10*3/MM3 (ref 0.7–3.1)
LYMPHOCYTES NFR BLD AUTO: 31.3 % (ref 19.6–45.3)
MCH RBC QN AUTO: 29.3 PG (ref 26.6–33)
MCHC RBC AUTO-ENTMCNC: 32.8 G/DL (ref 31.5–35.7)
MCV RBC AUTO: 89.2 FL (ref 79–97)
MONOCYTES # BLD AUTO: 0.87 10*3/MM3 (ref 0.1–0.9)
MONOCYTES NFR BLD AUTO: 7.4 % (ref 5–12)
NEUTROPHILS # BLD AUTO: 6.58 10*3/MM3 (ref 1.7–7)
NEUTROPHILS NFR BLD AUTO: 56.2 % (ref 42.7–76)
NRBC BLD AUTO-RTO: 0 /100 WBC (ref 0–0.2)
PLATELET # BLD AUTO: 261 10*3/MM3 (ref 140–450)
POTASSIUM SERPL-SCNC: 5.2 MMOL/L (ref 3.5–5.2)
PROT SERPL-MCNC: 6.7 G/DL (ref 6–8.5)
RBC # BLD AUTO: 5.02 10*6/MM3 (ref 4.14–5.8)
SODIUM SERPL-SCNC: 144 MMOL/L (ref 136–145)
TRIGL SERPL-MCNC: 81 MG/DL (ref 0–150)
TSH SERPL DL<=0.005 MIU/L-ACNC: 3.07 UIU/ML (ref 0.27–4.2)
VLDLC SERPL CALC-MCNC: 16 MG/DL (ref 5–40)
WBC # BLD AUTO: 11.7 10*3/MM3 (ref 3.4–10.8)

## 2024-02-07 ENCOUNTER — OFFICE VISIT (OUTPATIENT)
Dept: INTERNAL MEDICINE | Facility: CLINIC | Age: 79
End: 2024-02-07
Payer: MEDICARE

## 2024-02-07 VITALS
HEART RATE: 53 BPM | TEMPERATURE: 98 F | BODY MASS INDEX: 30.32 KG/M2 | HEIGHT: 71 IN | OXYGEN SATURATION: 98 % | DIASTOLIC BLOOD PRESSURE: 70 MMHG | WEIGHT: 216.6 LBS | SYSTOLIC BLOOD PRESSURE: 114 MMHG

## 2024-02-07 DIAGNOSIS — Z00.00 MEDICARE ANNUAL WELLNESS VISIT, SUBSEQUENT: Primary | ICD-10-CM

## 2024-02-07 DIAGNOSIS — I10 PRIMARY HYPERTENSION: ICD-10-CM

## 2024-02-07 DIAGNOSIS — E78.2 MIXED HYPERLIPIDEMIA: ICD-10-CM

## 2024-02-07 DIAGNOSIS — R56.9 SEIZURE: ICD-10-CM

## 2024-02-07 PROBLEM — E83.51 HYPOCALCEMIA: Status: RESOLVED | Noted: 2023-11-21 | Resolved: 2024-02-07

## 2024-02-07 PROBLEM — E87.6 HYPOKALEMIA: Status: RESOLVED | Noted: 2023-11-21 | Resolved: 2024-02-07

## 2024-02-07 PROBLEM — E83.42 HYPOMAGNESEMIA: Status: RESOLVED | Noted: 2023-11-21 | Resolved: 2024-02-07

## 2024-02-08 RX ORDER — MEMANTINE HYDROCHLORIDE 10 MG/1
10 TABLET ORAL 2 TIMES DAILY
Qty: 60 TABLET | Refills: 4 | Status: SHIPPED | OUTPATIENT
Start: 2024-02-08

## 2024-02-12 ENCOUNTER — ANTICOAGULATION VISIT (OUTPATIENT)
Dept: PHARMACY | Facility: HOSPITAL | Age: 79
End: 2024-02-12
Payer: MEDICARE

## 2024-02-12 DIAGNOSIS — I48.0 PAROXYSMAL ATRIAL FIBRILLATION: Primary | ICD-10-CM

## 2024-02-12 LAB
INR PPP: 2.3 (ref 0.91–1.09)
PROTHROMBIN TIME: 28.1 SECONDS (ref 10–13.8)

## 2024-02-12 PROCEDURE — G0463 HOSPITAL OUTPT CLINIC VISIT: HCPCS

## 2024-02-12 PROCEDURE — 85610 PROTHROMBIN TIME: CPT

## 2024-02-12 PROCEDURE — 36416 COLLJ CAPILLARY BLOOD SPEC: CPT

## 2024-03-04 ENCOUNTER — TELEPHONE (OUTPATIENT)
Dept: PHARMACY | Facility: HOSPITAL | Age: 79
End: 2024-03-04
Payer: MEDICARE

## 2024-03-06 ENCOUNTER — ANTICOAGULATION VISIT (OUTPATIENT)
Dept: PHARMACY | Facility: HOSPITAL | Age: 79
End: 2024-03-06
Payer: MEDICARE

## 2024-03-06 DIAGNOSIS — I48.0 PAROXYSMAL ATRIAL FIBRILLATION: Primary | ICD-10-CM

## 2024-03-06 LAB
INR PPP: 2.9 (ref 0.91–1.09)
PROTHROMBIN TIME: 34.3 SECONDS (ref 10–13.8)

## 2024-03-06 PROCEDURE — G0463 HOSPITAL OUTPT CLINIC VISIT: HCPCS

## 2024-03-06 PROCEDURE — 36416 COLLJ CAPILLARY BLOOD SPEC: CPT

## 2024-03-06 PROCEDURE — 85610 PROTHROMBIN TIME: CPT

## 2024-03-12 RX ORDER — WARFARIN SODIUM 5 MG/1
TABLET ORAL
Qty: 60 TABLET | Refills: 1 | Status: SHIPPED | OUTPATIENT
Start: 2024-03-12

## 2024-03-18 ENCOUNTER — TELEPHONE (OUTPATIENT)
Dept: INTERNAL MEDICINE | Facility: CLINIC | Age: 79
End: 2024-03-18
Payer: MEDICARE

## 2024-03-18 DIAGNOSIS — E11.40 CONTROLLED TYPE 2 DIABETES MELLITUS WITH DIABETIC NEUROPATHY, WITHOUT LONG-TERM CURRENT USE OF INSULIN: Primary | ICD-10-CM

## 2024-04-03 ENCOUNTER — ANTICOAGULATION VISIT (OUTPATIENT)
Dept: PHARMACY | Facility: HOSPITAL | Age: 79
End: 2024-04-03
Payer: MEDICARE

## 2024-04-03 DIAGNOSIS — I48.0 PAROXYSMAL ATRIAL FIBRILLATION: Primary | ICD-10-CM

## 2024-04-03 LAB
INR PPP: 3.5 (ref 0.91–1.09)
PROTHROMBIN TIME: 41.7 SECONDS (ref 10–13.8)

## 2024-04-03 PROCEDURE — 36416 COLLJ CAPILLARY BLOOD SPEC: CPT

## 2024-04-03 PROCEDURE — G0463 HOSPITAL OUTPT CLINIC VISIT: HCPCS

## 2024-04-03 PROCEDURE — 85610 PROTHROMBIN TIME: CPT

## 2024-04-17 RX ORDER — LEVOTHYROXINE SODIUM 0.05 MG/1
50 TABLET ORAL DAILY
Qty: 90 TABLET | Refills: 1 | Status: SHIPPED | OUTPATIENT
Start: 2024-04-17

## 2024-04-24 DIAGNOSIS — F03.90 DEMENTIA WITHOUT BEHAVIORAL DISTURBANCE: Primary | ICD-10-CM

## 2024-04-24 RX ORDER — MEMANTINE HYDROCHLORIDE 10 MG/1
10 TABLET ORAL 2 TIMES DAILY
Qty: 60 TABLET | Refills: 4 | Status: SHIPPED | OUTPATIENT
Start: 2024-04-24

## 2024-05-01 DIAGNOSIS — F32.1 CURRENT MODERATE EPISODE OF MAJOR DEPRESSIVE DISORDER, UNSPECIFIED WHETHER RECURRENT: ICD-10-CM

## 2024-05-01 RX ORDER — FLUOXETINE 10 MG/1
10 CAPSULE ORAL DAILY
Qty: 30 CAPSULE | Refills: 0 | OUTPATIENT
Start: 2024-05-01

## 2024-05-06 ENCOUNTER — ANTICOAGULATION VISIT (OUTPATIENT)
Dept: PHARMACY | Facility: HOSPITAL | Age: 79
End: 2024-05-06
Payer: MEDICARE

## 2024-05-06 DIAGNOSIS — I48.0 PAROXYSMAL ATRIAL FIBRILLATION: Primary | ICD-10-CM

## 2024-05-06 LAB
INR PPP: 2.2 (ref 0.91–1.09)
PROTHROMBIN TIME: 26.3 SECONDS (ref 10–13.8)

## 2024-05-06 PROCEDURE — 36416 COLLJ CAPILLARY BLOOD SPEC: CPT

## 2024-05-06 PROCEDURE — 85610 PROTHROMBIN TIME: CPT

## 2024-05-06 PROCEDURE — G0463 HOSPITAL OUTPT CLINIC VISIT: HCPCS

## 2024-05-20 ENCOUNTER — APPOINTMENT (OUTPATIENT)
Dept: PHARMACY | Facility: HOSPITAL | Age: 79
End: 2024-05-20
Payer: MEDICARE

## 2024-05-29 ENCOUNTER — ANTICOAGULATION VISIT (OUTPATIENT)
Dept: PHARMACY | Facility: HOSPITAL | Age: 79
End: 2024-05-29
Payer: MEDICARE

## 2024-05-29 DIAGNOSIS — I48.0 PAROXYSMAL ATRIAL FIBRILLATION: Primary | ICD-10-CM

## 2024-05-29 LAB
INR PPP: 2.6 (ref 0.91–1.09)
PROTHROMBIN TIME: 30.7 SECONDS (ref 10–13.8)

## 2024-05-29 PROCEDURE — G0463 HOSPITAL OUTPT CLINIC VISIT: HCPCS

## 2024-05-29 PROCEDURE — 36416 COLLJ CAPILLARY BLOOD SPEC: CPT

## 2024-05-29 PROCEDURE — 85610 PROTHROMBIN TIME: CPT

## 2024-05-31 RX ORDER — METHYLPREDNISOLONE 4 MG/1
TABLET ORAL
Qty: 21 TABLET | Refills: 0 | Status: SHIPPED | OUTPATIENT
Start: 2024-05-31

## 2024-06-13 DIAGNOSIS — F32.1 CURRENT MODERATE EPISODE OF MAJOR DEPRESSIVE DISORDER, UNSPECIFIED WHETHER RECURRENT: ICD-10-CM

## 2024-06-13 RX ORDER — LISINOPRIL 10 MG/1
10 TABLET ORAL DAILY
Qty: 90 TABLET | Refills: 1 | Status: SHIPPED | OUTPATIENT
Start: 2024-06-13

## 2024-06-14 ENCOUNTER — OFFICE VISIT (OUTPATIENT)
Dept: INTERNAL MEDICINE | Facility: CLINIC | Age: 79
End: 2024-06-14
Payer: MEDICARE

## 2024-06-14 ENCOUNTER — HOSPITAL ENCOUNTER (OUTPATIENT)
Dept: GENERAL RADIOLOGY | Facility: HOSPITAL | Age: 79
Discharge: HOME | End: 2024-06-14
Payer: MEDICARE

## 2024-06-14 VITALS
DIASTOLIC BLOOD PRESSURE: 70 MMHG | BODY MASS INDEX: 31.06 KG/M2 | HEART RATE: 58 BPM | TEMPERATURE: 97.9 F | HEIGHT: 71 IN | OXYGEN SATURATION: 98 % | WEIGHT: 221.9 LBS | SYSTOLIC BLOOD PRESSURE: 128 MMHG

## 2024-06-14 DIAGNOSIS — E11.40 CONTROLLED TYPE 2 DIABETES MELLITUS WITH DIABETIC NEUROPATHY, WITHOUT LONG-TERM CURRENT USE OF INSULIN: Primary | ICD-10-CM

## 2024-06-14 DIAGNOSIS — M54.50 ACUTE MIDLINE LOW BACK PAIN WITHOUT SCIATICA: ICD-10-CM

## 2024-06-14 DIAGNOSIS — Z12.11 COLON CANCER SCREENING: ICD-10-CM

## 2024-06-14 DIAGNOSIS — E78.5 HYPERLIPIDEMIA, UNSPECIFIED HYPERLIPIDEMIA TYPE: ICD-10-CM

## 2024-06-14 PROCEDURE — 72100 X-RAY EXAM L-S SPINE 2/3 VWS: CPT

## 2024-06-14 PROCEDURE — 3074F SYST BP LT 130 MM HG: CPT | Performed by: INTERNAL MEDICINE

## 2024-06-14 PROCEDURE — 1160F RVW MEDS BY RX/DR IN RCRD: CPT | Performed by: INTERNAL MEDICINE

## 2024-06-14 PROCEDURE — 99214 OFFICE O/P EST MOD 30 MIN: CPT | Performed by: INTERNAL MEDICINE

## 2024-06-14 PROCEDURE — 1159F MED LIST DOCD IN RCRD: CPT | Performed by: INTERNAL MEDICINE

## 2024-06-14 PROCEDURE — 3078F DIAST BP <80 MM HG: CPT | Performed by: INTERNAL MEDICINE

## 2024-06-14 PROCEDURE — 1125F AMNT PAIN NOTED PAIN PRSNT: CPT | Performed by: INTERNAL MEDICINE

## 2024-06-14 RX ORDER — FLUOXETINE 10 MG/1
10 CAPSULE ORAL DAILY
Qty: 30 CAPSULE | Refills: 0 | OUTPATIENT
Start: 2024-06-14

## 2024-06-25 ENCOUNTER — TELEPHONE (OUTPATIENT)
Dept: INTERNAL MEDICINE | Facility: CLINIC | Age: 79
End: 2024-06-25
Payer: MEDICARE

## 2024-06-25 DIAGNOSIS — M54.50 ACUTE MIDLINE LOW BACK PAIN WITHOUT SCIATICA: Primary | ICD-10-CM

## 2024-07-25 RX ORDER — ATORVASTATIN CALCIUM 40 MG/1
TABLET, FILM COATED ORAL
Qty: 90 TABLET | Refills: 1 | Status: SHIPPED | OUTPATIENT
Start: 2024-07-25

## 2024-08-05 ENCOUNTER — TELEPHONE (OUTPATIENT)
Dept: PHARMACY | Facility: HOSPITAL | Age: 79
End: 2024-08-05
Payer: MEDICARE

## 2024-08-09 ENCOUNTER — APPOINTMENT (OUTPATIENT)
Dept: PHARMACY | Facility: HOSPITAL | Age: 79
End: 2024-08-09
Payer: MEDICARE

## 2024-08-12 ENCOUNTER — ANTICOAGULATION VISIT (OUTPATIENT)
Dept: PHARMACY | Facility: HOSPITAL | Age: 79
End: 2024-08-12
Payer: MEDICARE

## 2024-08-12 DIAGNOSIS — I48.0 PAROXYSMAL ATRIAL FIBRILLATION: Primary | ICD-10-CM

## 2024-08-12 LAB
INR PPP: 2.5 (ref 0.91–1.09)
PROTHROMBIN TIME: 30.1 SECONDS (ref 10–13.8)

## 2024-08-12 PROCEDURE — 36416 COLLJ CAPILLARY BLOOD SPEC: CPT

## 2024-08-12 PROCEDURE — 85610 PROTHROMBIN TIME: CPT

## 2024-08-30 ENCOUNTER — OFFICE VISIT (OUTPATIENT)
Dept: CARDIOLOGY | Facility: CLINIC | Age: 79
End: 2024-08-30
Payer: MEDICARE

## 2024-08-30 VITALS
HEIGHT: 71 IN | WEIGHT: 233 LBS | SYSTOLIC BLOOD PRESSURE: 145 MMHG | OXYGEN SATURATION: 97 % | HEART RATE: 74 BPM | BODY MASS INDEX: 32.62 KG/M2 | DIASTOLIC BLOOD PRESSURE: 90 MMHG

## 2024-08-30 DIAGNOSIS — I10 PRIMARY HYPERTENSION: ICD-10-CM

## 2024-08-30 DIAGNOSIS — I48.0 PAROXYSMAL ATRIAL FIBRILLATION: Primary | ICD-10-CM

## 2024-08-30 DIAGNOSIS — Z86.73 HISTORY OF STROKE: ICD-10-CM

## 2024-08-30 DIAGNOSIS — R63.4 UNINTENTIONAL WEIGHT LOSS: ICD-10-CM

## 2024-08-30 RX ORDER — METOPROLOL SUCCINATE 25 MG/1
25 TABLET, EXTENDED RELEASE ORAL DAILY
Qty: 90 TABLET | Refills: 3 | Status: SHIPPED | OUTPATIENT
Start: 2024-08-30

## 2024-08-30 RX ORDER — WARFARIN SODIUM 5 MG/1
TABLET ORAL
Qty: 90 TABLET | Refills: 3 | Status: SHIPPED | OUTPATIENT
Start: 2024-08-30

## 2024-09-09 ENCOUNTER — ANTICOAGULATION VISIT (OUTPATIENT)
Dept: PHARMACY | Facility: HOSPITAL | Age: 79
End: 2024-09-09
Payer: MEDICARE

## 2024-09-09 DIAGNOSIS — I48.0 PAROXYSMAL ATRIAL FIBRILLATION: Primary | ICD-10-CM

## 2024-09-09 LAB
INR PPP: 2.8 (ref 0.91–1.09)
PROTHROMBIN TIME: 33.2 SECONDS (ref 10–13.8)

## 2024-09-09 PROCEDURE — 36416 COLLJ CAPILLARY BLOOD SPEC: CPT

## 2024-09-09 PROCEDURE — 85610 PROTHROMBIN TIME: CPT

## 2024-09-09 PROCEDURE — G0463 HOSPITAL OUTPT CLINIC VISIT: HCPCS

## 2024-09-10 DIAGNOSIS — I48.0 PAROXYSMAL ATRIAL FIBRILLATION: ICD-10-CM

## 2024-09-10 RX ORDER — WARFARIN SODIUM 5 MG/1
TABLET ORAL
Qty: 60 TABLET | Refills: 1 | Status: SHIPPED | OUTPATIENT
Start: 2024-09-10

## 2024-09-18 ENCOUNTER — OFFICE VISIT (OUTPATIENT)
Dept: NEUROLOGY | Facility: CLINIC | Age: 79
End: 2024-09-18
Payer: MEDICARE

## 2024-09-18 VITALS
WEIGHT: 233 LBS | OXYGEN SATURATION: 97 % | BODY MASS INDEX: 32.62 KG/M2 | DIASTOLIC BLOOD PRESSURE: 70 MMHG | HEART RATE: 56 BPM | SYSTOLIC BLOOD PRESSURE: 150 MMHG

## 2024-09-18 DIAGNOSIS — F03.90 DEMENTIA WITHOUT BEHAVIORAL DISTURBANCE: Primary | ICD-10-CM

## 2024-09-18 DIAGNOSIS — R26.89 IMPAIRMENT OF BALANCE: ICD-10-CM

## 2024-09-18 DIAGNOSIS — E11.42 DIABETIC PERIPHERAL NEUROPATHY: ICD-10-CM

## 2024-09-19 ENCOUNTER — OFFICE VISIT (OUTPATIENT)
Dept: INTERNAL MEDICINE | Facility: CLINIC | Age: 79
End: 2024-09-19
Payer: MEDICARE

## 2024-09-19 VITALS
DIASTOLIC BLOOD PRESSURE: 72 MMHG | BODY MASS INDEX: 33.39 KG/M2 | WEIGHT: 238.5 LBS | TEMPERATURE: 98 F | OXYGEN SATURATION: 98 % | HEIGHT: 71 IN | SYSTOLIC BLOOD PRESSURE: 124 MMHG | HEART RATE: 54 BPM

## 2024-09-19 DIAGNOSIS — E78.2 MIXED HYPERLIPIDEMIA: ICD-10-CM

## 2024-09-19 DIAGNOSIS — I10 PRIMARY HYPERTENSION: Primary | ICD-10-CM

## 2024-09-19 DIAGNOSIS — E03.8 OTHER SPECIFIED HYPOTHYROIDISM: ICD-10-CM

## 2024-09-19 DIAGNOSIS — E11.40 CONTROLLED TYPE 2 DIABETES MELLITUS WITH DIABETIC NEUROPATHY, WITHOUT LONG-TERM CURRENT USE OF INSULIN: ICD-10-CM

## 2024-09-19 PROCEDURE — 99214 OFFICE O/P EST MOD 30 MIN: CPT | Performed by: INTERNAL MEDICINE

## 2024-09-19 PROCEDURE — 1125F AMNT PAIN NOTED PAIN PRSNT: CPT | Performed by: INTERNAL MEDICINE

## 2024-09-19 PROCEDURE — 3078F DIAST BP <80 MM HG: CPT | Performed by: INTERNAL MEDICINE

## 2024-09-19 PROCEDURE — 3074F SYST BP LT 130 MM HG: CPT | Performed by: INTERNAL MEDICINE

## 2024-10-07 ENCOUNTER — ANTICOAGULATION VISIT (OUTPATIENT)
Dept: PHARMACY | Facility: HOSPITAL | Age: 79
End: 2024-10-07
Payer: MEDICARE

## 2024-10-07 DIAGNOSIS — I48.0 PAROXYSMAL ATRIAL FIBRILLATION: Primary | ICD-10-CM

## 2024-10-07 LAB
INR PPP: 3 (ref 0.91–1.09)
PROTHROMBIN TIME: 36.1 SECONDS (ref 10–13.8)

## 2024-10-07 PROCEDURE — G0463 HOSPITAL OUTPT CLINIC VISIT: HCPCS

## 2024-10-07 PROCEDURE — 85610 PROTHROMBIN TIME: CPT

## 2024-10-07 PROCEDURE — 36416 COLLJ CAPILLARY BLOOD SPEC: CPT

## 2024-10-23 RX ORDER — LEVOTHYROXINE SODIUM 50 UG/1
50 TABLET ORAL DAILY
Qty: 90 TABLET | Refills: 1 | Status: SHIPPED | OUTPATIENT
Start: 2024-10-23

## 2024-10-23 RX ORDER — MONTELUKAST SODIUM 4 MG/1
3 TABLET, CHEWABLE ORAL 2 TIMES DAILY
Qty: 180 TABLET | Refills: 5 | Status: SHIPPED | OUTPATIENT
Start: 2024-10-23

## 2024-11-01 DIAGNOSIS — F03.90 DEMENTIA WITHOUT BEHAVIORAL DISTURBANCE: Primary | ICD-10-CM

## 2024-11-01 RX ORDER — DONEPEZIL HYDROCHLORIDE 10 MG/1
10 TABLET, FILM COATED ORAL NIGHTLY
Qty: 30 TABLET | Refills: 4 | Status: SHIPPED | OUTPATIENT
Start: 2024-11-01

## 2024-11-29 DIAGNOSIS — F03.90 DEMENTIA WITHOUT BEHAVIORAL DISTURBANCE: ICD-10-CM

## 2024-12-02 RX ORDER — MEMANTINE HYDROCHLORIDE 10 MG/1
10 TABLET ORAL 2 TIMES DAILY
Qty: 180 TABLET | Refills: 1 | Status: SHIPPED | OUTPATIENT
Start: 2024-12-02

## 2024-12-09 RX ORDER — LISINOPRIL 10 MG/1
10 TABLET ORAL DAILY
Qty: 90 TABLET | Refills: 1 | Status: SHIPPED | OUTPATIENT
Start: 2024-12-09

## 2025-01-20 RX ORDER — ATORVASTATIN CALCIUM 40 MG/1
TABLET, FILM COATED ORAL
Qty: 90 TABLET | Refills: 1 | Status: SHIPPED | OUTPATIENT
Start: 2025-01-20

## 2025-02-07 DIAGNOSIS — F03.90 DEMENTIA WITHOUT BEHAVIORAL DISTURBANCE: ICD-10-CM

## 2025-02-07 RX ORDER — DONEPEZIL HYDROCHLORIDE 10 MG/1
10 TABLET, FILM COATED ORAL NIGHTLY
Qty: 90 TABLET | Refills: 2 | Status: SHIPPED | OUTPATIENT
Start: 2025-02-07

## 2025-02-25 ENCOUNTER — TELEPHONE (OUTPATIENT)
Dept: INTERNAL MEDICINE | Facility: CLINIC | Age: 80
End: 2025-02-25
Payer: MEDICARE

## 2025-02-25 DIAGNOSIS — F03.90 DEMENTIA WITHOUT BEHAVIORAL DISTURBANCE: ICD-10-CM

## 2025-02-25 RX ORDER — DONEPEZIL HYDROCHLORIDE 10 MG/1
10 TABLET, FILM COATED ORAL NIGHTLY
Qty: 90 TABLET | Refills: 1 | Status: SHIPPED | OUTPATIENT
Start: 2025-02-25

## 2025-03-03 ENCOUNTER — ANTICOAGULATION VISIT (OUTPATIENT)
Dept: PHARMACY | Facility: HOSPITAL | Age: 80
End: 2025-03-03
Payer: MEDICARE

## 2025-03-03 DIAGNOSIS — I48.0 PAROXYSMAL ATRIAL FIBRILLATION: Primary | ICD-10-CM

## 2025-03-03 LAB
INR PPP: 2.3 (ref 0.91–1.09)
PROTHROMBIN TIME: 27.4 SECONDS (ref 10–13.8)

## 2025-03-03 PROCEDURE — 85610 PROTHROMBIN TIME: CPT

## 2025-03-03 PROCEDURE — G0463 HOSPITAL OUTPT CLINIC VISIT: HCPCS

## 2025-03-03 NOTE — PROGRESS NOTES
I have supervised and reviewed the notes, assessments, and/or procedures performed. I personally performed the assessment and implemented the plan. I concur with the documentation of this patient encounter.    Christelle Denton, [facility]

## 2025-03-03 NOTE — PROGRESS NOTES
Anticoagulation Clinic Progress Note    Anticoagulation Summary  As of 3/3/2025      INR goal:  2.0-3.0   TTR:  66.5% (5.5 y)   INR used for dosin.3 (3/3/2025)   Warfarin maintenance plan:  5 mg every Sun, Thu; 2.5 mg all other days   Weekly warfarin total:  22.5 mg   Plan last modified:  Elbert Sánchez, PharmD (2024)   Next INR check:  2025   Priority:  Maintenance   Target end date:  --    Indications    Paroxysmal atrial fibrillation [I48.0]                 Anticoagulation Episode Summary       INR check location:  --    Preferred lab:  --    Send INR reminders to:   [facility]    Comments:  --          Anticoagulation Care Providers       Provider Role Specialty Phone number    Becky Cramer, APRN Referring Cardiology [phone]    Kushal Pinzon MD Referring Cardiology [phone]            Clinic Interview:  Patient Findings     Negatives:  Signs/symptoms of thrombosis, Signs/symptoms of bleeding,   Laboratory test error suspected, Change in health, Change in alcohol use,   Change in activity, Upcoming invasive procedure, Emergency department   visit, Upcoming dental procedure, Missed doses, Extra doses, Change in   medications, Change in diet/appetite, Hospital admission, Bruising, Other   complaints    Comments:  INR = 2.3. No changes, cont current dosing regimen (5 mg SuTh,   2.5 mg AODs), recheck 6 weeks.       Clinical Outcomes     Negatives:  Major bleeding event, Thromboembolic event,   Anticoagulation-related hospital admission, Anticoagulation-related ED   visit, Anticoagulation-related fatality    Comments:  INR = 2.3. No changes, cont current dosing regimen (5 mg SuTh,   2.5 mg AODs), recheck 6 weeks.         INR History:      4/3/2024     9:45 AM 2024     9:45 AM 2024    10:00 AM 2024     1:00 PM 2024    11:30 AM 10/7/2024    11:45 AM 3/3/2025    11:00 AM   Anticoagulation Monitoring   INR 3.5 2.2 2.6 2.5 2.8 3.0 2.3   INR Date 4/3/2024  5/6/2024 5/29/2024 8/12/2024 9/9/2024 10/7/2024 3/3/2025   INR Goal 2.0-3.0 2.0-3.0 2.0-3.0 2.0-3.0 2.0-3.0 2.0-3.0 2.0-3.0   Trend Same Same Same Same Same Same Same   Last Week Total 22.5 mg 22.5 mg 22.5 mg 22.5 mg 22.5 mg 22.5 mg 22.5 mg   Next Week Total 20 mg 22.5 mg 22.5 mg 22.5 mg 22.5 mg 22.5 mg 22.5 mg   Sun 5 mg 5 mg 5 mg 5 mg 5 mg 5 mg 5 mg   Mon 2.5 mg 2.5 mg 2.5 mg 2.5 mg 2.5 mg 2.5 mg 2.5 mg   Tue 2.5 mg 2.5 mg 2.5 mg 2.5 mg 2.5 mg 2.5 mg 2.5 mg   Wed Hold (4/3); Otherwise 2.5 mg 2.5 mg 2.5 mg 2.5 mg 2.5 mg 2.5 mg 2.5 mg   Thu 5 mg 5 mg 5 mg 5 mg 5 mg 5 mg 5 mg   Fri 2.5 mg 2.5 mg 2.5 mg 2.5 mg 2.5 mg 2.5 mg 2.5 mg   Sat 2.5 mg 2.5 mg 2.5 mg 2.5 mg 2.5 mg 2.5 mg 2.5 mg       Plan:  1. INR is Therapeutic today- see above in Anticoagulation Summary.  Will instruct Jorge Bailon to Continue their warfarin regimen (5 mg SuTh, 2.5 mg AODs) - see above in Anticoagulation Summary.  2. Follow up in 6 weeks  3. Patient declines warfarin refills.  4. Verbal and written information provided. Patient expresses understanding and has no further questions at this time.    [place], Pharmacy Intern

## 2025-03-20 DIAGNOSIS — E11.40 CONTROLLED TYPE 2 DIABETES MELLITUS WITH DIABETIC NEUROPATHY, WITHOUT LONG-TERM CURRENT USE OF INSULIN: ICD-10-CM

## 2025-03-20 DIAGNOSIS — I10 PRIMARY HYPERTENSION: ICD-10-CM

## 2025-03-20 DIAGNOSIS — Z00.00 MEDICARE ANNUAL WELLNESS VISIT, SUBSEQUENT: Primary | ICD-10-CM

## 2025-03-20 DIAGNOSIS — E78.2 MIXED HYPERLIPIDEMIA: ICD-10-CM

## 2025-03-20 DIAGNOSIS — E03.8 OTHER SPECIFIED HYPOTHYROIDISM: ICD-10-CM

## 2025-03-22 LAB
ALBUMIN SERPL-MCNC: 4.1 G/DL (ref 3.5–5.2)
ALBUMIN/GLOB SERPL: 1.5 G/DL
ALP SERPL-CCNC: 144 U/L (ref 39–117)
ALT SERPL-CCNC: 26 U/L (ref 1–41)
AST SERPL-CCNC: 18 U/L (ref 1–40)
BASOPHILS # BLD AUTO: 0.05 10*3/MM3 (ref 0–0.2)
BASOPHILS NFR BLD AUTO: 0.4 % (ref 0–1.5)
BILIRUB SERPL-MCNC: 0.5 MG/DL (ref 0–1.2)
BUN SERPL-MCNC: 10 MG/DL (ref 8–23)
BUN/CREAT SERPL: 8.3 (ref 7–25)
CALCIUM SERPL-MCNC: 9.4 MG/DL (ref 8.6–10.5)
CHLORIDE SERPL-SCNC: 108 MMOL/L (ref 98–107)
CHOLEST SERPL-MCNC: 96 MG/DL (ref 0–200)
CO2 SERPL-SCNC: 26.6 MMOL/L (ref 22–29)
CREAT SERPL-MCNC: 1.21 MG/DL (ref 0.76–1.27)
EGFRCR SERPLBLD CKD-EPI 2021: 61.3 ML/MIN/1.73
EOSINOPHIL # BLD AUTO: 0.48 10*3/MM3 (ref 0–0.4)
EOSINOPHIL NFR BLD AUTO: 4.2 % (ref 0.3–6.2)
ERYTHROCYTE [DISTWIDTH] IN BLOOD BY AUTOMATED COUNT: 12.9 % (ref 12.3–15.4)
GLOBULIN SER CALC-MCNC: 2.7 GM/DL
GLUCOSE SERPL-MCNC: 121 MG/DL (ref 65–99)
HBA1C MFR BLD: 6.5 % (ref 4.8–5.6)
HCT VFR BLD AUTO: 45.3 % (ref 37.5–51)
HDLC SERPL-MCNC: 32 MG/DL (ref 40–60)
HGB BLD-MCNC: 15 G/DL (ref 13–17.7)
IMM GRANULOCYTES # BLD AUTO: 0.04 10*3/MM3 (ref 0–0.05)
IMM GRANULOCYTES NFR BLD AUTO: 0.4 % (ref 0–0.5)
LDLC SERPL CALC-MCNC: 36 MG/DL (ref 0–100)
LDLC/HDLC SERPL: 0.93 {RATIO}
LYMPHOCYTES # BLD AUTO: 3.2 10*3/MM3 (ref 0.7–3.1)
LYMPHOCYTES NFR BLD AUTO: 28.2 % (ref 19.6–45.3)
MCH RBC QN AUTO: 28.8 PG (ref 26.6–33)
MCHC RBC AUTO-ENTMCNC: 33.1 G/DL (ref 31.5–35.7)
MCV RBC AUTO: 87.1 FL (ref 79–97)
MONOCYTES # BLD AUTO: 0.81 10*3/MM3 (ref 0.1–0.9)
MONOCYTES NFR BLD AUTO: 7.1 % (ref 5–12)
NEUTROPHILS # BLD AUTO: 6.78 10*3/MM3 (ref 1.7–7)
NEUTROPHILS NFR BLD AUTO: 59.7 % (ref 42.7–76)
NRBC BLD AUTO-RTO: 0 /100 WBC (ref 0–0.2)
PLATELET # BLD AUTO: 253 10*3/MM3 (ref 140–450)
POTASSIUM SERPL-SCNC: 3.8 MMOL/L (ref 3.5–5.2)
PROT SERPL-MCNC: 6.8 G/DL (ref 6–8.5)
RBC # BLD AUTO: 5.2 10*6/MM3 (ref 4.14–5.8)
SODIUM SERPL-SCNC: 147 MMOL/L (ref 136–145)
TRIGL SERPL-MCNC: 171 MG/DL (ref 0–150)
TSH SERPL DL<=0.005 MIU/L-ACNC: 2.05 UIU/ML (ref 0.27–4.2)
VLDLC SERPL CALC-MCNC: 28 MG/DL (ref 5–40)
WBC # BLD AUTO: 11.36 10*3/MM3 (ref 3.4–10.8)

## 2025-03-28 ENCOUNTER — OFFICE VISIT (OUTPATIENT)
Dept: INTERNAL MEDICINE | Facility: CLINIC | Age: 80
End: 2025-03-28
Payer: MEDICARE

## 2025-03-28 VITALS
TEMPERATURE: 98.2 F | WEIGHT: 231 LBS | SYSTOLIC BLOOD PRESSURE: 140 MMHG | HEIGHT: 71 IN | OXYGEN SATURATION: 98 % | DIASTOLIC BLOOD PRESSURE: 68 MMHG | HEART RATE: 62 BPM | BODY MASS INDEX: 32.34 KG/M2

## 2025-03-28 DIAGNOSIS — E78.2 MIXED HYPERLIPIDEMIA: ICD-10-CM

## 2025-03-28 DIAGNOSIS — E03.8 OTHER SPECIFIED HYPOTHYROIDISM: ICD-10-CM

## 2025-03-28 DIAGNOSIS — I10 PRIMARY HYPERTENSION: ICD-10-CM

## 2025-03-28 DIAGNOSIS — Z00.00 MEDICARE ANNUAL WELLNESS VISIT, SUBSEQUENT: Primary | ICD-10-CM

## 2025-03-28 DIAGNOSIS — E11.40 CONTROLLED TYPE 2 DIABETES MELLITUS WITH DIABETIC NEUROPATHY, WITHOUT LONG-TERM CURRENT USE OF INSULIN: ICD-10-CM

## 2025-03-28 NOTE — PROGRESS NOTES
Subjective   Jorge Bailon is a 80 y.o. male.   Fu with FL    History of Present Illness   Pt has been taking cholesterol meds as prescribed.  No difficulties with myalgias.   Pt has been taking BP meds as prescribed without any problems.  No HA  No episodes of orthostasis  He does cont to have some diarrhea and int trouble on the colestid but still having trouble int  he is going to travel to see his brother and is concerned about traveling with this  Pt has been compliant with diabetes meds. No side effects from medication No episodes of hypoglycemia. Patient denies any polyuria or polydipsia  Pt has been doing well with thyroid meds.  Taking as perscribed without any complications    The following portions of the patient's history were reviewed and updated as appropriate: allergies, current medications, past family history, past medical history, past social history, past surgical history, and problem list.    Review of Systems    Objective   Physical Exam  Vitals reviewed.   Constitutional:       Appearance: He is well-developed.   HENT:      Head: Normocephalic and atraumatic.      Right Ear: External ear normal.      Left Ear: External ear normal.   Eyes:      Conjunctiva/sclera: Conjunctivae normal.      Pupils: Pupils are equal, round, and reactive to light.   Neck:      Thyroid: No thyromegaly.      Trachea: No tracheal deviation.   Cardiovascular:      Rate and Rhythm: Normal rate and regular rhythm.      Heart sounds: Normal heart sounds.   Pulmonary:      Effort: Pulmonary effort is normal.      Breath sounds: Normal breath sounds.   Abdominal:      General: Bowel sounds are normal. There is no distension.      Palpations: Abdomen is soft.      Tenderness: There is no abdominal tenderness.   Musculoskeletal:         General: No deformity. Normal range of motion.      Cervical back: Normal range of motion.   Skin:     General: Skin is warm and dry.   Neurological:      Mental Status: He is alert and  oriented to person, place, and time.   Psychiatric:         Behavior: Behavior normal.         Thought Content: Thought content normal.         Judgment: Judgment normal.         Vitals:    03/28/25 1053   BP: 140/68   Pulse: 62   Temp: 98.2 °F (36.8 °C)   SpO2: 98%     Body mass index is 32.34 kg/m².         Assessment & Plan   Diagnoses and all orders for this visit:    1. Medicare annual wellness visit, subsequent (Primary)    2. Primary hypertension    3. Mixed hyperlipidemia    4. Other specified hypothyroidism    5. Controlled type 2 diabetes mellitus with diabetic neuropathy, without long-term current use of insulin       HTN-ok with current meds  2.   IBS-D-  this is chronic and to travel  rec taking imodium with travel  3.  HPL-  ok with lipitor  4.  DM-  ok with janunia  5.  Memory issues-  stable  cont with current meds

## 2025-03-28 NOTE — PATIENT INSTRUCTIONS
Medicare Wellness  Personal Prevention Plan of Service     Date of Office Visit:    Encounter Provider:  Delaney Singleton MD  Place of Service:  [facility] INTERNAL MEDICINE  Patient Name: Jorge Bailon  :  1945    As part of the Medicare Wellness portion of your visit today, we are providing you with this personalized preventive plan of services (PPPS). This plan is based upon recommendations of the [place] Preventive Services Task Force (USPSTF) and the Advisory Committee on Immunization Practices (ACIP).    This lists the preventive care services that should be considered, and provides dates of when you are due. Items listed as completed are up-to-date and do not require any further intervention.    Health Maintenance   Topic Date Due    COVID-19 Vaccine ( season) 2025 (Originally 2024)    ZOSTER VACCINE (2 of 3) 2025 (Originally 2014)    DIABETIC EYE EXAM  2025 (Originally 2024)    RSV Vaccine - Adults (1 - 1-dose 75+ series) 2025 (Originally 2022)    HEMOGLOBIN A1C  2025    LIPID PANEL  2026    ANNUAL WELLNESS VISIT  2026    TDAP/TD VACCINES (2 - Td or Tdap) 2029    HEPATITIS C SCREENING  Completed    INFLUENZA VACCINE  Completed    Pneumococcal Vaccine 50+  Completed    URINE MICROALBUMIN-CREATININE RATIO (uACR)  Discontinued    COLORECTAL CANCER SCREENING  Discontinued       No orders of the defined types were placed in this encounter.      Return in about 6 months (around 2025).

## 2025-03-28 NOTE — PROGRESS NOTES
Subjective   The ABCs of the Annual Wellness Visit  Medicare Wellness Visit      Jorge Bailon is a 80 y.o. patient who presents for a Medicare Wellness Visit.    The following portions of the patient's history were reviewed and   updated as appropriate: allergies, current medications, past family history, past medical history, past social history, past surgical history, and problem list.    Compared to one year ago, the patient's physical   health is the same.  Compared to one year ago, the patient's mental   health is the same.    Recent Hospitalizations:  He was not admitted to the hospital during the last year.     Current Medical Providers:  Patient Care Team:  Delaney Singleton MD as PCP - General (Internal Medicine)  Maurilio Barnes MD as Consulting Physician (Urology)  Kushal Pinzon MD as Consulting Physician (Cardiology)  Delaney Singleton MD as Referring Physician (Internal Medicine)  Ariel Aguirre II, MD as Consulting Physician (Hematology and Oncology)  Geoff Singleton MD as Surgeon (Otolaryngology)  Elbetr Sánchez, SoniyaD as Pharmacist (Pharmacy)  Allison Velasquez RPH as Pharmacist (Pharmacy)  Abdirahman Chowdary MD as Consulting Physician (Gastroenterology)  Cat Jonas APRN as Nurse Practitioner (Nurse Practitioner)    Outpatient Medications Prior to Visit   Medication Sig Dispense Refill    atorvastatin (LIPITOR) 40 MG tablet TAKE ONE TABLET BY MOUTH ONCE NIGHTLY 90 tablet 1    B Complex Vitamins (VITAMIN-B COMPLEX PO) Take 1 tablet by mouth Daily.      Cholecalciferol (VITAMIN D3) 2000 UNITS tablet Take 1 tablet by mouth Daily.      colestipol (COLESTID) 1 g tablet TAKE 3 TABLETS BY MOUTH TWICE A  tablet 5    Cyanocobalamin (B-12 PO) Take 1 dose by mouth 3 (Three) Times a Week. Monday Wednesday and Friday      diphenoxylate-atropine (LOMOTIL) 2.5-0.025 MG per tablet Take 1 tablet by mouth 4 (Four) Times a Day As Needed for Diarrhea. 120 tablet 2    donepezil (ARICEPT) 10 MG tablet  Take 1 tablet by mouth Every Night. 90 tablet 1    glucose blood (PHARMACIST CHOICE AUTOCODE) test strip 1 each by Other route 2 (Two) Times a Day. Use as instructed 200 each 3    Januvia 100 MG tablet TAKE ONE TABLET BY MOUTH DAILY (Patient taking differently: Take 1 tablet by mouth Daily.) 90 tablet 1    levothyroxine (SYNTHROID, LEVOTHROID) 50 MCG tablet TAKE 1 TABLET BY MOUTH DAILY 90 tablet 1    lisinopril (PRINIVIL,ZESTRIL) 10 MG tablet TAKE 1 TABLET BY MOUTH DAILY 90 tablet 1    memantine (NAMENDA) 10 MG tablet TAKE 1 TABLET BY MOUTH 2 TIMES A  tablet 1    metoprolol succinate XL (TOPROL-XL) 25 MG 24 hr tablet Take 1 tablet by mouth Daily. 90 tablet 3    PHARMACIST CHOICE LANCETS misc 1 each 2 (Two) Times a Day. 200 each 3    warfarin (COUMADIN) 5 MG tablet TAKE 1 TABLET BY MOUTH DAILY ON SUNDAYS AND THURSDAYS AND TAKE 1/2 A TABLET BY MOUTH DAILY ON ALL OTHER DAYS OF THE WEEK 60 tablet 1     No facility-administered medications prior to visit.     No opioid medication identified on active medication list. I have reviewed chart for other potential  high risk medication/s and harmful drug interactions in the elderly.      Aspirin is not on active medication list.  Aspirin use is not indicated based on review of current medical condition/s. Risk of harm outweighs potential benefits.  .    Patient Active Problem List   Diagnosis    Elevated prostate specific antigen (PSA)    Gastroesophageal reflux disease with esophagitis    Hyperlipidemia    Hypertension    Hypothyroidism    Prosthetic joint infection    Osteoarthritis    Controlled type 2 diabetes mellitus with diabetic neuropathy    Cobalamin deficiency    Vitamin D deficiency    Paroxysmal atrial fibrillation    Unsteady gait    Leukocytosis    History of stroke    Cerebrovascular disease    Ataxia    Cognitive impairment    Falls    Hydrocephalus ex vacuo    Seizure    Dementia without behavioral disturbance    Diarrhea    History of colonic polyps  "   Prolonged QT interval    Unintentional weight loss     Advance Care Planning Advance Directive is not on file.  ACP discussion was held with the patient during this visit. Patient has an advance directive (not in EMR), copy requested.            Objective   Vitals:    03/28/25 1053   BP: 140/68   BP Location: Left arm   Patient Position: Sitting   Pulse: 62   Temp: 98.2 °F (36.8 °C)   TempSrc: Oral   SpO2: 98%   Weight: 105 kg (231 lb)   Height: 180 cm (70.87\")       Estimated body mass index is 32.34 kg/m² as calculated from the following:    Height as of this encounter: 180 cm (70.87\").    Weight as of this encounter: 105 kg (231 lb).    BMI is >= 30 and <35. (Class 1 Obesity). The following options were offered after discussion;: exercise counseling/recommendations and nutrition counseling/recommendations           Does the patient have evidence of cognitive impairment? No  Lab Results   Component Value Date    CHLPL 96 03/21/2025    TRIG 171 (H) 03/21/2025    HDL 32 (L) 03/21/2025    LDL 36 03/21/2025    VLDL 28 03/21/2025    HGBA1C 6.50 (H) 03/21/2025                                                                                               Health  Risk Assessment    Smoking Status:  Social History     Tobacco Use   Smoking Status Never   Smokeless Tobacco Never   Tobacco Comments    caffeine use - soda     Alcohol Consumption:  Social History     Substance and Sexual Activity   Alcohol Use Not Currently    Comment: one drink every 6 mos.       Fall Risk Screen  STEADI Fall Risk Assessment was completed, and patient is at MODERATE risk for falls. Assessment completed on:3/28/2025    Depression Screening   Little interest or pleasure in doing things? Not at all   Feeling down, depressed, or hopeless? Not at all   PHQ-2 Total Score 0      Health Habits and Functional and Cognitive Screening:      3/28/2025    10:00 AM   Functional & Cognitive Status   Do you have difficulty preparing food and eating? No "   Do you have difficulty bathing yourself, getting dressed or grooming yourself? Yes   Do you have difficulty using the toilet? No   Do you have difficulty moving around from place to place? No   Do you have trouble with steps or getting out of a bed or a chair? No   Current Diet Limited Junk Food   Dental Exam Up to date   Eye Exam Not up to date   Exercise (times per week) 0 times per week   Current Exercises Include No Regular Exercise   Do you need help using the phone?  No   Are you deaf or do you have serious difficulty hearing?  Yes   Do you need help to go to places out of walking distance? No   Do you need help shopping? No   Do you need help preparing meals?  No   Do you need help with housework?  No   Do you need help with laundry? No   Do you need help taking your medications? No   Do you need help managing money? No   Do you ever drive or ride in a car without wearing a seat belt? No   Have you felt unusual stress, anger or loneliness in the last month? No   Who do you live with? Spouse   If you need help, do you have trouble finding someone available to you? No   Have you been bothered in the last four weeks by sexual problems? No   Do you have difficulty concentrating, remembering or making decisions? No           Age-appropriate Screening Schedule:  Refer to the list below for future screening recommendations based on patient's age, sex and/or medical conditions. Orders for these recommended tests are listed in the plan section. The patient has been provided with a written plan.    Health Maintenance List  Health Maintenance   Topic Date Due    ANNUAL WELLNESS VISIT  02/07/2025    COVID-19 Vaccine (5 - 2024-25 season) 04/11/2025 (Originally 9/1/2024)    ZOSTER VACCINE (2 of 3) 04/11/2025 (Originally 1/21/2014)    DIABETIC EYE EXAM  05/16/2025 (Originally 6/16/2024)    RSV Vaccine - Adults (1 - 1-dose 75+ series) 09/19/2025 (Originally 1/8/2022)    HEMOGLOBIN A1C  09/21/2025    LIPID PANEL  03/21/2026     TDAP/TD VACCINES (2 - Td or Tdap) 09/02/2029    HEPATITIS C SCREENING  Completed    INFLUENZA VACCINE  Completed    Pneumococcal Vaccine 50+  Completed    URINE MICROALBUMIN-CREATININE RATIO (uACR)  Discontinued    COLORECTAL CANCER SCREENING  Discontinued                                                                                                                                                CMS Preventative Services Quick Reference  Risk Factors Identified During Encounter  Fall Risk-High or Moderate: Discussed Fall Prevention in the home  Inactivity/Sedentary: Patient was advised to exercise at least 150 minutes a week per CDC recommendations.    The above risks/problems have been discussed with the patient.  Pertinent information has been shared with the patient in the After Visit Summary.  An After Visit Summary and PPPS were made available to the patient.    Follow Up:   Next Medicare Wellness visit to be scheduled in 1 year.     Assessment & Plan  Medicare annual wellness visit, subsequent         Primary hypertension           Mixed hyperlipidemia            Other specified hypothyroidism         Controlled type 2 diabetes mellitus with diabetic neuropathy, without long-term current use of insulin                Follow Up:   No follow-ups on file.

## 2025-04-14 ENCOUNTER — ANTICOAGULATION VISIT (OUTPATIENT)
Dept: PHARMACY | Facility: HOSPITAL | Age: 80
End: 2025-04-14
Payer: MEDICARE

## 2025-04-14 DIAGNOSIS — I48.0 PAROXYSMAL ATRIAL FIBRILLATION: Primary | ICD-10-CM

## 2025-04-14 LAB
INR PPP: 2.3 (ref 0.91–1.09)
PROTHROMBIN TIME: 27.4 SECONDS (ref 10–13.8)

## 2025-04-14 PROCEDURE — G0463 HOSPITAL OUTPT CLINIC VISIT: HCPCS

## 2025-04-14 PROCEDURE — 85610 PROTHROMBIN TIME: CPT

## 2025-04-14 NOTE — PROGRESS NOTES
I have supervised and reviewed the notes, assessments, and/or procedures performed. The documented assessment and plan were developed cooperatively, and the plan was implemented in my presence. I concur with the documentation of this patient encounter.    Christelle Denton, [facility]

## 2025-04-14 NOTE — PROGRESS NOTES
Anticoagulation Clinic Progress Note    Anticoagulation Summary  As of 2025      INR goal:  2.0-3.0   TTR:  67.2% (5.6 y)   INR used for dosin.3 (2025)   Warfarin maintenance plan:  5 mg every Sun, Thu; 2.5 mg all other days   Weekly warfarin total:  22.5 mg   No change documented:  Olivia Olivares, Pharmacy Intern   Plan last modified:  Elbert Sánchez, PharmD (2024)   Next INR check:  2025   Priority:  Maintenance   Target end date:  --    Indications    Paroxysmal atrial fibrillation [I48.0]                 Anticoagulation Episode Summary       INR check location:  --    Preferred lab:  --    Send INR reminders to:   [facility] CLINICAL [place]    Comments:  --          Anticoagulation Care Providers       Provider Role Specialty Phone number    Becky Cramer, ERIKA Referring Cardiology [phone]    Kushal Pinzon MD Referring Cardiology [phone]            Clinic Interview:  Patient Findings     Negatives:  Signs/symptoms of thrombosis, Signs/symptoms of bleeding,   Laboratory test error suspected, Change in health, Change in alcohol use,   Change in activity, Upcoming invasive procedure, Emergency department   visit, Upcoming dental procedure, Missed doses, Extra doses, Change in   medications, Change in diet/appetite, Hospital admission, Bruising, Other   complaints      Clinical Outcomes     Negatives:  Major bleeding event, Thromboembolic event,   Anticoagulation-related hospital admission, Anticoagulation-related ED   visit, Anticoagulation-related fatality        INR History:      2024     9:45 AM 2024    10:00 AM 2024     1:00 PM 2024    11:30 AM 10/7/2024    11:45 AM 3/3/2025    11:00 AM 2025    10:00 AM   Anticoagulation Monitoring   INR 2.2 2.6 2.5 2.8 3.0 2.3 2.3   INR Date 2024 2024 2024 2024 10/7/2024 3/3/2025 2025   INR Goal 2.0-3.0 2.0-3.0 2.0-3.0 2.0-3.0 2.0-3.0 2.0-3.0 2.0-3.0   Trend Same Same Same Same Same Same  Same   Last Week Total 22.5 mg 22.5 mg 22.5 mg 22.5 mg 22.5 mg 22.5 mg 22.5 mg   Next Week Total 22.5 mg 22.5 mg 22.5 mg 22.5 mg 22.5 mg 22.5 mg 22.5 mg   Sun 5 mg 5 mg 5 mg 5 mg 5 mg 5 mg 5 mg   Mon 2.5 mg 2.5 mg 2.5 mg 2.5 mg 2.5 mg 2.5 mg 2.5 mg   Tue 2.5 mg 2.5 mg 2.5 mg 2.5 mg 2.5 mg 2.5 mg 2.5 mg   Wed 2.5 mg 2.5 mg 2.5 mg 2.5 mg 2.5 mg 2.5 mg 2.5 mg   Thu 5 mg 5 mg 5 mg 5 mg 5 mg 5 mg 5 mg   Fri 2.5 mg 2.5 mg 2.5 mg 2.5 mg 2.5 mg 2.5 mg 2.5 mg   Sat 2.5 mg 2.5 mg 2.5 mg 2.5 mg 2.5 mg 2.5 mg 2.5 mg       Plan:  1. INR is therapeutic today- see above in Anticoagulation Summary.   Will instruct Jorge Bailon to continue their warfarin regimen- see above in Anticoagulation Summary.  2. Follow up in 7 weeks (patient will be out of town at 6 weeks).  3. Patient declines warfarin refills.  4. Verbal and written information provided. Patient expresses understanding and has no further questions at this time.    Olivia Olivares, Pharmacy Intern

## 2025-04-17 RX ORDER — LEVOTHYROXINE SODIUM 50 UG/1
50 TABLET ORAL DAILY
Qty: 90 TABLET | Refills: 1 | Status: SHIPPED | OUTPATIENT
Start: 2025-04-17

## 2025-06-02 ENCOUNTER — ANTICOAGULATION VISIT (OUTPATIENT)
Dept: PHARMACY | Facility: HOSPITAL | Age: 80
End: 2025-06-02
Payer: MEDICARE

## 2025-06-02 DIAGNOSIS — F03.90 DEMENTIA WITHOUT BEHAVIORAL DISTURBANCE: ICD-10-CM

## 2025-06-02 DIAGNOSIS — I48.0 PAROXYSMAL ATRIAL FIBRILLATION: Primary | ICD-10-CM

## 2025-06-02 LAB
INR PPP: 2.3 (ref 0.91–1.09)
PROTHROMBIN TIME: 28 SECONDS (ref 10–13.8)

## 2025-06-02 PROCEDURE — G0463 HOSPITAL OUTPT CLINIC VISIT: HCPCS | Performed by: PHARMACIST

## 2025-06-02 PROCEDURE — 85610 PROTHROMBIN TIME: CPT

## 2025-06-02 RX ORDER — LISINOPRIL 10 MG/1
10 TABLET ORAL DAILY
Qty: 90 TABLET | Refills: 1 | Status: SHIPPED | OUTPATIENT
Start: 2025-06-02

## 2025-06-02 RX ORDER — MEMANTINE HYDROCHLORIDE 10 MG/1
10 TABLET ORAL 2 TIMES DAILY
Qty: 180 TABLET | Refills: 1 | Status: SHIPPED | OUTPATIENT
Start: 2025-06-02

## 2025-06-02 NOTE — PROGRESS NOTES
I have supervised and reviewed the notes, assessments, and/or procedures performed. The documented assessment and plan were developed cooperatively, and the plan was implemented in my presence. I concur with the documentation of this patient encounter.    Wally Iraheta, PharmD, BCACP

## 2025-06-02 NOTE — PROGRESS NOTES
Anticoagulation Clinic Progress Note    Anticoagulation Summary  As of 2025      INR goal:  2.0-3.0   TTR:  67.9% (5.7 y)   INR used for dosin.3 (2025)   Warfarin maintenance plan:  5 mg every Sun, Thu; 2.5 mg all other days   Weekly warfarin total:  22.5 mg   No change documented:  Andrea Razo, Pharmacy Technician   Plan last modified:  Elbert Sánchez, PharmD (2024)   Next INR check:  2025   Priority:  Maintenance   Target end date:  --    Indications    Paroxysmal atrial fibrillation [I48.0]                 Anticoagulation Episode Summary       INR check location:  --    Preferred lab:  --    Send INR reminders to:   [facility] CLIN[facility] [place]    Comments:  --          Anticoagulation Care Providers       Provider Role Specialty Phone number    Becky Cramer, APRN Referring Cardiology [phone]    Kushal Pinzon MD Referring Cardiology [phone]            Clinic Interview:  Patient Findings     Negatives:  Signs/symptoms of thrombosis, Signs/symptoms of bleeding,   Laboratory test error suspected, Change in health, Change in alcohol use,   Change in activity, Upcoming invasive procedure, Emergency department   visit, Upcoming dental procedure, Missed doses, Extra doses, Change in   medications, Change in diet/appetite, Hospital admission, Bruising, Other   complaints      Clinical Outcomes     Negatives:  Major bleeding event, Thromboembolic event,   Anticoagulation-related hospital admission, Anticoagulation-related ED   visit, Anticoagulation-related fatality        INR History:      2024    10:00 AM 2024     1:00 PM 2024    11:30 AM 10/7/2024    11:45 AM 3/3/2025    11:00 AM 2025    10:00 AM 2025    10:00 AM   Anticoagulation Monitoring   INR 2.6 2.5 2.8 3.0 2.3 2.3 2.3   INR Date 2024 2024 2024 10/7/2024 3/3/2025 2025 2025   INR Goal 2.0-3.0 2.0-3.0 2.0-3.0 2.0-3.0 2.0-3.0 2.0-3.0 2.0-3.0   Trend Same Same Same Same Same  Same Same   Last Week Total 22.5 mg 22.5 mg 22.5 mg 22.5 mg 22.5 mg 22.5 mg 22.5 mg   Next Week Total 22.5 mg 22.5 mg 22.5 mg 22.5 mg 22.5 mg 22.5 mg 22.5 mg   Sun 5 mg 5 mg 5 mg 5 mg 5 mg 5 mg 5 mg   Mon 2.5 mg 2.5 mg 2.5 mg 2.5 mg 2.5 mg 2.5 mg 2.5 mg   Tue 2.5 mg 2.5 mg 2.5 mg 2.5 mg 2.5 mg 2.5 mg 2.5 mg   Wed 2.5 mg 2.5 mg 2.5 mg 2.5 mg 2.5 mg 2.5 mg 2.5 mg   Thu 5 mg 5 mg 5 mg 5 mg 5 mg 5 mg 5 mg   Fri 2.5 mg 2.5 mg 2.5 mg 2.5 mg 2.5 mg 2.5 mg 2.5 mg   Sat 2.5 mg 2.5 mg 2.5 mg 2.5 mg 2.5 mg 2.5 mg 2.5 mg       Plan:  1. INR is therapeutic today- see above in Anticoagulation Summary.   Will instruct Jorge Bailon to continue their warfarin regimen- see above in Anticoagulation Summary.  2. Follow up in 6 weeks.  3. Patient declines warfarin refills.  4. Verbal and written information provided. Patient expresses understanding and has no further questions at this time.    Andrea Razo, Pharmacy Technician

## 2025-07-14 ENCOUNTER — ANTICOAGULATION VISIT (OUTPATIENT)
Dept: PHARMACY | Facility: HOSPITAL | Age: 80
End: 2025-07-14
Payer: MEDICARE

## 2025-07-14 DIAGNOSIS — I48.0 PAROXYSMAL ATRIAL FIBRILLATION: Primary | ICD-10-CM

## 2025-07-14 LAB
INR PPP: 2.3 (ref 0.91–1.09)
PROTHROMBIN TIME: 27.2 SECONDS (ref 10–13.8)

## 2025-07-14 PROCEDURE — G0463 HOSPITAL OUTPT CLINIC VISIT: HCPCS

## 2025-07-14 PROCEDURE — 85610 PROTHROMBIN TIME: CPT

## 2025-07-14 NOTE — PROGRESS NOTES
I have supervised and reviewed the notes, assessments, and/or procedures performed. I personally performed the assessment and implemented the plan. I concur with the documentation of this patient encounter.    Christelle Denton, [facility]

## 2025-07-14 NOTE — PROGRESS NOTES
Anticoagulation Clinic Progress Note    Anticoagulation Summary  As of 2025      INR goal:  2.0-3.0   TTR:  68.6% (5.8 y)   INR used for dosin.3 (2025)   Warfarin maintenance plan:  5 mg every Sun, Thu; 2.5 mg all other days   Weekly warfarin total:  22.5 mg   No change documented:  Leila Roca   Plan last modified:  Elbert Sánchez, PharmD (2024)   Next INR check:  2025   Priority:  Maintenance   Target end date:  --    Indications    Paroxysmal atrial fibrillation [I48.0]                 Anticoagulation Episode Summary       INR check location:  --    Preferred lab:  --    Send INR reminders to:   [facility] [place]    Comments:  --          Anticoagulation Care Providers       Provider Role Specialty Phone number    Becky Cramer, APRN Referring Cardiology [phone]    Kushal Pinzon MD Referring Cardiology [phone]            Clinic Interview:  Patient Findings     Negatives:  Signs/symptoms of thrombosis, Signs/symptoms of bleeding,   Laboratory test error suspected, Change in health, Change in alcohol use,   Change in activity, Upcoming invasive procedure, Emergency department   visit, Upcoming dental procedure, Missed doses, Extra doses, Change in   medications, Change in diet/appetite, Hospital admission, Bruising, Other   complaints      Clinical Outcomes     Negatives:  Major bleeding event, Thromboembolic event,   Anticoagulation-related hospital admission, Anticoagulation-related ED   visit, Anticoagulation-related fatality        INR History:      2024     1:00 PM 2024    11:30 AM 10/7/2024    11:45 AM 3/3/2025    11:00 AM 2025    10:00 AM 2025    10:00 AM 2025    10:00 AM   Anticoagulation Monitoring   INR 2.5 2.8 3.0 2.3 2.3 2.3 2.3   INR Date 2024 2024 10/7/2024 3/3/2025 2025 2025 2025   INR Goal 2.0-3.0 2.0-3.0 2.0-3.0 2.0-3.0 2.0-3.0 2.0-3.0 2.0-3.0   Trend Same Same Same Same Same Same Same    Last Week Total 22.5 mg 22.5 mg 22.5 mg 22.5 mg 22.5 mg 22.5 mg 22.5 mg   Next Week Total 22.5 mg 22.5 mg 22.5 mg 22.5 mg 22.5 mg 22.5 mg 22.5 mg   Sun 5 mg 5 mg 5 mg 5 mg 5 mg 5 mg 5 mg   Mon 2.5 mg 2.5 mg 2.5 mg 2.5 mg 2.5 mg 2.5 mg 2.5 mg   Tue 2.5 mg 2.5 mg 2.5 mg 2.5 mg 2.5 mg 2.5 mg 2.5 mg   Wed 2.5 mg 2.5 mg 2.5 mg 2.5 mg 2.5 mg 2.5 mg 2.5 mg   Thu 5 mg 5 mg 5 mg 5 mg 5 mg 5 mg 5 mg   Fri 2.5 mg 2.5 mg 2.5 mg 2.5 mg 2.5 mg 2.5 mg 2.5 mg   Sat 2.5 mg 2.5 mg 2.5 mg 2.5 mg 2.5 mg 2.5 mg 2.5 mg       Plan:  1. INR is therapeutic today- see above in Anticoagulation Summary.   Will instruct Jorge Bailon to continue their warfarin regimen- see above in Anticoagulation Summary.  2. Follow up in 6 weeks.  3. Patient declines warfarin refills.  4. Verbal and written information provided. Patient expresses understanding and has no further questions at this time.    Leila Roca

## 2025-07-21 RX ORDER — ATORVASTATIN CALCIUM 40 MG/1
40 TABLET, FILM COATED ORAL NIGHTLY
Qty: 90 TABLET | Refills: 1 | Status: SHIPPED | OUTPATIENT
Start: 2025-07-21

## 2025-08-25 ENCOUNTER — ANTICOAGULATION VISIT (OUTPATIENT)
Dept: PHARMACY | Facility: HOSPITAL | Age: 80
End: 2025-08-25
Payer: MEDICARE

## 2025-08-25 DIAGNOSIS — I48.0 PAROXYSMAL ATRIAL FIBRILLATION: Primary | ICD-10-CM

## 2025-08-25 LAB
INR PPP: 1.8 (ref 0.91–1.09)
PROTHROMBIN TIME: 21.3 SECONDS (ref 10–13.8)

## 2025-08-25 PROCEDURE — 85610 PROTHROMBIN TIME: CPT

## 2025-08-25 PROCEDURE — G0463 HOSPITAL OUTPT CLINIC VISIT: HCPCS

## (undated) DEVICE — FRCP BX RADJAW4 NDL 2.8 240CM LG OG BX40

## (undated) DEVICE — THE TORRENT IRRIGATION SCOPE CONNECTOR IS USED WITH THE TORRENT IRRIGATION TUBING TO PROVIDE IRRIGATION FLUIDS SUCH AS STERILE WATER DURING GASTROINTESTINAL ENDOSCOPIC PROCEDURES WHEN USED IN CONJUNCTION WITH AN IRRIGATION PUMP (OR ELECTROSURGICAL UNIT).: Brand: TORRENT

## (undated) DEVICE — ADAPT CLN SCPE ENDO PORPOISE BX/50 DISP

## (undated) DEVICE — BITEBLOCK OMNI BLOC

## (undated) DEVICE — LASSO POLYPECTOMY SNARE: Brand: LASSO

## (undated) DEVICE — SUREFIT, DUAL DISPERSIVE ELECTRODE, CONTACT QUALITY MONITOR: Brand: SUREFIT

## (undated) DEVICE — Device

## (undated) DEVICE — GOWN PROC ENDOARMOR GI LVL3 HY/SHLD UNIV

## (undated) DEVICE — SINGLE-USE BIOPSY FORCEPS: Brand: RADIAL JAW 4

## (undated) DEVICE — VIAL FORMLN CAP 10PCT 20ML

## (undated) DEVICE — TBG 02 CRUSH RESIST LF CLR 7FT

## (undated) DEVICE — THE SINGLE USE ETRAP – POLYP TRAP IS USED FOR SUCTION RETRIEVAL OF ENDOSCOPICALLY REMOVED POLYPS.: Brand: ETRAP

## (undated) DEVICE — KT ORCA ORCAPOD DISP STRL

## (undated) DEVICE — CANN O2 ETCO2 FITS ALL CONN CO2 SMPL A/ 7IN DISP LF

## (undated) DEVICE — PAD GRND REM POLYHESIVE A/ DISP

## (undated) DEVICE — Device: Brand: DEFENDO AIR/WATER/SUCTION AND BIOPSY VALVE

## (undated) DEVICE — NDL SCLEROTHERAPY INTERJECT 25G 4 240CM

## (undated) DEVICE — SNAR POLYP SENSATION STDOVL 27 240 BX40

## (undated) DEVICE — SNAR POLYP CAPTIVATOR CRSNT 27MM 240CM

## (undated) DEVICE — SYRINGE, LUER SLIP, STERILE, 60ML: Brand: MEDLINE

## (undated) DEVICE — CANN NASL CO2 TRULINK W/O2 A/

## (undated) DEVICE — TUBING, SUCTION, 1/4" X 10', STRAIGHT: Brand: MEDLINE

## (undated) DEVICE — FLEX ADVANTAGE 1500CC: Brand: FLEX ADVANTAGE